# Patient Record
Sex: FEMALE | Race: WHITE | NOT HISPANIC OR LATINO | Employment: FULL TIME | URBAN - METROPOLITAN AREA
[De-identification: names, ages, dates, MRNs, and addresses within clinical notes are randomized per-mention and may not be internally consistent; named-entity substitution may affect disease eponyms.]

---

## 2017-01-03 ENCOUNTER — HOSPITAL ENCOUNTER (OUTPATIENT)
Dept: CARDIOLOGY | Facility: HOSPITAL | Age: 42
Discharge: HOME | End: 2017-01-03
Attending: INTERNAL MEDICINE

## 2017-01-03 VITALS
BODY MASS INDEX: 22.53 KG/M2 | RESPIRATION RATE: 20 BRPM | WEIGHT: 132 LBS | SYSTOLIC BLOOD PRESSURE: 117 MMHG | OXYGEN SATURATION: 97 % | DIASTOLIC BLOOD PRESSURE: 82 MMHG | HEART RATE: 88 BPM | HEIGHT: 64 IN

## 2017-01-03 DIAGNOSIS — R55 VASOVAGAL SYNCOPE: ICD-10-CM

## 2017-01-03 LAB
BH CV STRESS BP STAGE 1: NORMAL
BH CV STRESS BP STAGE 2: NORMAL
BH CV STRESS BP STAGE 3: NORMAL
BH CV STRESS DURATION MIN STAGE 1: 3
BH CV STRESS DURATION MIN STAGE 2: 3
BH CV STRESS DURATION MIN STAGE 3: 3
BH CV STRESS DURATION SEC STAGE 1: 0
BH CV STRESS DURATION SEC STAGE 2: 0
BH CV STRESS DURATION SEC STAGE 3: 0
BH CV STRESS GRADE STAGE 1: 10
BH CV STRESS GRADE STAGE 2: 12
BH CV STRESS GRADE STAGE 3: 14
BH CV STRESS HR STAGE 1: 133
BH CV STRESS HR STAGE 2: 140
BH CV STRESS HR STAGE 3: 167
BH CV STRESS METS STAGE 1: 5
BH CV STRESS METS STAGE 2: 7.5
BH CV STRESS METS STAGE 3: 10
BH CV STRESS PROTOCOL 1: NORMAL
BH CV STRESS RECOVERY BP: NORMAL MMHG
BH CV STRESS RECOVERY HR: 100 BPM
BH CV STRESS SPEED STAGE 1: 1.7
BH CV STRESS SPEED STAGE 2: 2.5
BH CV STRESS SPEED STAGE 3: 3.4
BH CV STRESS STAGE 1: 1
BH CV STRESS STAGE 2: 2
BH CV STRESS STAGE 3: 3
MAXIMAL PREDICTED HEART RATE: 178 BPM
PERCENT MAX PREDICTED HR: 96.07 %
STRESS BASELINE BP: NORMAL MMHG
STRESS BASELINE HR: 88 BPM
STRESS O2 SAT REST: 97 %
STRESS PERCENT HR: 113 %
STRESS POST ESTIMATED WORKLOAD: 10.2 METS
STRESS POST EXERCISE DUR MIN: 9 MIN
STRESS POST EXERCISE DUR SEC: 0 SEC
STRESS POST PEAK BP: NORMAL MMHG
STRESS POST PEAK HR: 171 BPM
STRESS TARGET HR: 151 BPM

## 2017-01-03 PROCEDURE — 93017 CV STRESS TEST TRACING ONLY: CPT

## 2017-01-03 PROCEDURE — 93018 CV STRESS TEST I&R ONLY: CPT | Performed by: INTERNAL MEDICINE

## 2017-01-03 PROCEDURE — 93016 CV STRESS TEST SUPVJ ONLY: CPT | Performed by: INTERNAL MEDICINE

## 2017-02-10 ENCOUNTER — OFFICE VISIT (OUTPATIENT)
Dept: INTERNAL MEDICINE | Facility: CLINIC | Age: 42
End: 2017-02-10

## 2017-02-10 VITALS
WEIGHT: 132.4 LBS | HEART RATE: 85 BPM | BODY MASS INDEX: 22.61 KG/M2 | SYSTOLIC BLOOD PRESSURE: 120 MMHG | OXYGEN SATURATION: 98 % | DIASTOLIC BLOOD PRESSURE: 80 MMHG | HEIGHT: 64 IN

## 2017-02-10 DIAGNOSIS — F90.2 ATTENTION DEFICIT HYPERACTIVITY DISORDER (ADHD), COMBINED TYPE: Primary | ICD-10-CM

## 2017-02-10 DIAGNOSIS — F41.9 ANXIETY: ICD-10-CM

## 2017-02-10 PROCEDURE — 99214 OFFICE O/P EST MOD 30 MIN: CPT | Performed by: INTERNAL MEDICINE

## 2017-02-10 RX ORDER — FLUOXETINE HYDROCHLORIDE 20 MG/1
20 CAPSULE ORAL DAILY
Qty: 90 CAPSULE | Refills: 3 | Status: SHIPPED | OUTPATIENT
Start: 2017-02-10 | End: 2017-11-21 | Stop reason: SDUPTHER

## 2017-02-10 RX ORDER — DEXTROAMPHETAMINE SACCHARATE, AMPHETAMINE ASPARTATE, DEXTROAMPHETAMINE SULFATE AND AMPHETAMINE SULFATE 5; 5; 5; 5 MG/1; MG/1; MG/1; MG/1
20 TABLET ORAL DAILY
Qty: 30 TABLET | Refills: 0 | Status: SHIPPED | OUTPATIENT
Start: 2017-02-10 | End: 2017-05-03 | Stop reason: SDUPTHER

## 2017-02-10 NOTE — PROGRESS NOTES
Subjective     Ruth Casillas is a 41 y.o. female, who presents with a chief complaint of   Chief Complaint   Patient presents with   • Med Refill       HPI   The pt is here because her psychiatrist moved to [place].  Psych was treating her anxiety and ADHD but is now too far for the pt to get there for regular check ups.  She has done well on adderall and prozac.  Her sx are currently well controlled with prozac and adderall.  Sleep ok.  Weight steady.  Mood ok.      The following portions of the patient's history were reviewed and updated as appropriate: allergies, current medications, past family history, past medical history, past social history, past surgical history and problem list.    Allergies: Review of patient's allergies indicates no known allergies.    Review of Systems   Constitutional: Negative.    HENT: Negative.    Eyes: Negative.    Respiratory: Negative.    Cardiovascular: Negative.    Gastrointestinal: Negative.    Endocrine: Negative.    Genitourinary: Negative.    Musculoskeletal: Negative.    Skin: Negative.    Allergic/Immunologic: Negative.    Neurological: Negative.    Hematological: Negative.    Psychiatric/Behavioral: Positive for decreased concentration.   All other systems reviewed and are negative.      Objective     Wt Readings from Last 3 Encounters:   02/10/17 132 lb 6.4 oz (60.1 kg)   01/03/17 132 lb (59.9 kg)   12/20/16 132 lb (59.9 kg)     Temp Readings from Last 3 Encounters:   07/01/16 98.1 °F (36.7 °C) (Oral)   04/14/15 98.8 °F (37.1 °C) (Oral)   01/21/15 98.1 °F (36.7 °C)     BP Readings from Last 3 Encounters:   02/10/17 120/80   01/03/17 117/82   12/20/16 116/66     Pulse Readings from Last 3 Encounters:   02/10/17 85   01/03/17 88   12/20/16 85     Body mass index is 22.73 kg/(m^2).  SpO2 Readings from Last 3 Encounters:   02/10/17 98%   01/03/17 97%   09/27/16 98%       Physical Exam   Constitutional: She is oriented to person, place, and time. She appears  well-developed and well-nourished. No distress.   HENT:   Head: Normocephalic and atraumatic.   Right Ear: External ear normal.   Left Ear: External ear normal.   Nose: Nose normal.   Mouth/Throat: Oropharynx is clear and moist.   Eyes: Conjunctivae and EOM are normal. Pupils are equal, round, and reactive to light.   Neck: Normal range of motion. Neck supple.   Cardiovascular: Normal rate, regular rhythm, normal heart sounds and intact distal pulses.    Pulmonary/Chest: Effort normal and breath sounds normal. No respiratory distress. She has no wheezes.   Musculoskeletal: Normal range of motion.   Normal gait   Neurological: She is alert and oriented to person, place, and time.   Skin: Skin is warm and dry.   Psychiatric: She has a normal mood and affect. Her behavior is normal. Judgment and thought content normal.   Nursing note and vitals reviewed.      Results for orders placed or performed during the hospital encounter of 01/03/17   Treadmill Stress Test   Result Value Ref Range    BH CV STRESS PROTOCOL 1 Pardeep     Stage 1 1     HR Stage 1 133     BP Stage 1 163/90     Duration Min Stage 1 3     Duration Sec Stage 1 0     Grade Stage 1 10     Speed Stage 1 1.7     BH CV STRESS METS STAGE 1 5     Stage 2 2     HR Stage 2 140     BP Stage 2 152/84     Duration Min Stage 2 3     Duration Sec Stage 2 0     Grade Stage 2 12     Speed Stage 2 2.5     BH CV STRESS METS STAGE 2 7.5     Stage 3 3     HR Stage 3 167     BP Stage 3 171/81     Duration Min Stage 3 3     Duration Sec Stage 3 0     Grade Stage 3 14     Speed Stage 3 3.4     BH CV STRESS METS STAGE 3 10.0     Baseline HR 88 bpm    Baseline /82 mmHg    O2 sat rest 97 %    Peak  bpm    Percent Max Pred HR 96.07 %    Percent Target  %    Peak /90 mmHg    Recovery  bpm    Recovery /86 mmHg    Target HR (85%) 151 bpm    Max. Pred. HR (100%) 178 bpm    Exercise duration (min) 9 min    Exercise duration (sec) 0 sec    Estimated  workload 10.2 METS       Assessment/Plan   Ruth was seen today for med refill.    Diagnoses and all orders for this visit:    Attention deficit hyperactivity disorder (ADHD), combined type  -     amphetamine-dextroamphetamine (ADDERALL) 20 MG tablet; Take 1 tablet by mouth Daily.  -     Urine Drug Screen; Future    Anxiety  -     FLUoxetine (PROzac) 20 MG capsule; Take 1 capsule by mouth Daily.      Fill one time.  Obtain records from psych.    Outpatient Medications Prior to Visit   Medication Sig Dispense Refill   • amphetamine-dextroamphetamine (ADDERALL) 20 MG tablet Take 20 mg by mouth daily.     • FLUoxetine (PROzac) 20 MG capsule Take 20 mg by mouth daily.       No facility-administered medications prior to visit.      New Medications Ordered This Visit   Medications   • FLUoxetine (PROzac) 20 MG capsule     Sig: Take 1 capsule by mouth Daily.     Dispense:  90 capsule     Refill:  3   • amphetamine-dextroamphetamine (ADDERALL) 20 MG tablet     Sig: Take 1 tablet by mouth Daily.     Dispense:  30 tablet     Refill:  0       Medications Discontinued During This Encounter   Medication Reason   • FLUoxetine (PROzac) 20 MG capsule Reorder   • amphetamine-dextroamphetamine (ADDERALL) 20 MG tablet Reorder         Return in about 3 months (around 5/10/2017) for Recheck.

## 2017-02-14 ENCOUNTER — TELEPHONE (OUTPATIENT)
Dept: INTERNAL MEDICINE | Facility: CLINIC | Age: 42
End: 2017-02-14

## 2017-02-14 NOTE — TELEPHONE ENCOUNTER
[facility] returned phone call, release on file, will send us records.   ----- Message from Jerri Wellington MD sent at 2/10/2017  4:33 PM EST -----  Regarding: psych records  Get records from [facility] to document pt's adhd.  Provider was at [facility] and moved to [place].

## 2017-02-18 LAB
AMPHETAMINES UR QL: POSITIVE
BARBITURATES UR QL SCN: NEGATIVE NG/ML
BENZODIAZ UR QL SCN: NEGATIVE NG/ML
BZE UR QL SCN: NEGATIVE NG/ML
CANNABINOIDS UR QL SCN: NEGATIVE NG/ML
CREAT UR-MCNC: 208.2 MG/DL (ref 20–300)
FENTANYL+NORFENTANYL UR QL SCN: NEGATIVE PG/ML
Lab: ABNORMAL
MEPERIDINE UR QL: NEGATIVE NG/ML
METHADONE UR QL SCN: NEGATIVE NG/ML
OPIATES UR QL SCN: NEGATIVE NG/ML
OXYCODONE+OXYMORPHONE UR QL SCN: NEGATIVE NG/ML
PCP UR QL: NEGATIVE NG/ML
PH UR: 5.3 [PH] (ref 4.5–8.9)
PROPOXYPH UR QL SCN: NEGATIVE NG/ML
SP GR UR: 1.03
TRAMADOL UR QL SCN: NEGATIVE NG/ML

## 2017-05-03 DIAGNOSIS — F90.2 ATTENTION DEFICIT HYPERACTIVITY DISORDER (ADHD), COMBINED TYPE: ICD-10-CM

## 2017-05-03 RX ORDER — DEXTROAMPHETAMINE SACCHARATE, AMPHETAMINE ASPARTATE, DEXTROAMPHETAMINE SULFATE AND AMPHETAMINE SULFATE 5; 5; 5; 5 MG/1; MG/1; MG/1; MG/1
20 TABLET ORAL DAILY
Qty: 30 TABLET | Refills: 0 | OUTPATIENT
Start: 2017-05-03

## 2017-05-03 RX ORDER — DEXTROAMPHETAMINE SACCHARATE, AMPHETAMINE ASPARTATE, DEXTROAMPHETAMINE SULFATE AND AMPHETAMINE SULFATE 5; 5; 5; 5 MG/1; MG/1; MG/1; MG/1
20 TABLET ORAL DAILY
Qty: 30 TABLET | Refills: 0 | Status: SHIPPED | OUTPATIENT
Start: 2017-05-03 | End: 2017-06-16 | Stop reason: SDUPTHER

## 2017-06-08 ENCOUNTER — TELEPHONE (OUTPATIENT)
Dept: INTERNAL MEDICINE | Facility: CLINIC | Age: 42
End: 2017-06-08

## 2017-06-08 NOTE — TELEPHONE ENCOUNTER
----- Message from Kindra Main MA sent at 2017  3:35 PM EDT -----  Regarding: FW: NEEDS A REFILL  Contact: [phone]      ----- Message -----     From: Shahla Wright MD     Sent: 2017   2:47 PM       To: Kindra Main MA  Subject: RE: NEEDS A REFILL                               We need to see her every 3 months for her ADHD. She was supposed to follow up with Dr. Wellington in 2017. Please have her schedule appointment to be seen with one of the providers.     ----- Message -----     From: Kindra Main MA     Sent: 2017   2:17 PM       To: Shahla Wright MD  Subject: FW: NEEDS A REFILL                                Dr wellington's pt,   lov   2/10/17  [facility]     ----- Message -----     From: Nayeli Marcial     Sent: 2017   1:56 PM       To: Kindra Main MA  Subject: NEEDS A REFILL                                   :  73  CHLOE PATIENT    PATIENT NEEDS A REFILL ON ADDERALL, 20 MG TABLET, TAKES ONE A DAY. PATIENT FORMERLY TOOK TWICE A DAY AND FEELS SHE NEEDS TO TAKE 20 MG TWICE A DAY.

## 2017-06-16 ENCOUNTER — OFFICE VISIT (OUTPATIENT)
Dept: INTERNAL MEDICINE | Facility: CLINIC | Age: 42
End: 2017-06-16

## 2017-06-16 VITALS
DIASTOLIC BLOOD PRESSURE: 78 MMHG | OXYGEN SATURATION: 98 % | BODY MASS INDEX: 22.94 KG/M2 | HEART RATE: 97 BPM | SYSTOLIC BLOOD PRESSURE: 118 MMHG | HEIGHT: 64 IN | WEIGHT: 134.4 LBS

## 2017-06-16 DIAGNOSIS — F90.2 ATTENTION DEFICIT HYPERACTIVITY DISORDER (ADHD), COMBINED TYPE: Primary | ICD-10-CM

## 2017-06-16 PROCEDURE — 99213 OFFICE O/P EST LOW 20 MIN: CPT | Performed by: INTERNAL MEDICINE

## 2017-06-16 RX ORDER — DEXTROAMPHETAMINE SACCHARATE, AMPHETAMINE ASPARTATE, DEXTROAMPHETAMINE SULFATE AND AMPHETAMINE SULFATE 5; 5; 5; 5 MG/1; MG/1; MG/1; MG/1
20 TABLET ORAL 2 TIMES DAILY
Qty: 60 TABLET | Refills: 0 | Status: SHIPPED | OUTPATIENT
Start: 2017-06-16 | End: 2017-07-27 | Stop reason: SDUPTHER

## 2017-06-16 NOTE — PROGRESS NOTES
"Subjective     Ruth Casillas is a 41 y.o. female, who presents with a chief complaint of   Chief Complaint   Patient presents with   • Follow-up     Dr. Wellington pt, 3 mon f/u   • ADHD     med check, refill        HPI Comments: Patient is here for follow up of her ADHD and is patient of Dr. Wellington's that I seeing for follow up today and her ADHD is new to me. She is doing well and excited to go on vacation. Her ADHD is fair, but felt that she was better controlled on 20mg BID. She says that she feels more scattered brained when she takes it once per day. Her daughter has noticed her being less \"focused\". Her weight is stable and her HR is slightly higher today because she is excited about vacation.        The following portions of the patient's history were reviewed and updated as appropriate: allergies, current medications, past family history, past medical history, past social history, past surgical history and problem list.    Allergies: Review of patient's allergies indicates no known allergies.    Current Outpatient Prescriptions:   •  amphetamine-dextroamphetamine (ADDERALL) 20 MG tablet, Take 1 tablet by mouth 2 (Two) Times a Day., Disp: 60 tablet, Rfl: 0  •  FLUoxetine (PROzac) 20 MG capsule, Take 1 capsule by mouth Daily., Disp: 90 capsule, Rfl: 3  Medications Discontinued During This Encounter   Medication Reason   • amphetamine-dextroamphetamine (ADDERALL) 20 MG tablet Reorder       Review of Systems   Constitutional: Negative for chills, fatigue and fever.   Cardiovascular: Negative for chest pain and palpitations.   Gastrointestinal: Negative for nausea and vomiting.   Skin: Negative for rash.   Psychiatric/Behavioral: Positive for decreased concentration. The patient is not nervous/anxious and is not hyperactive.        Objective     /78 (BP Location: Left arm, Patient Position: Sitting, Cuff Size: Adult)  Pulse 97  Ht 64\" (162.6 cm)  Wt 134 lb 6.4 oz (61 kg)  SpO2 98%  BMI 23.07 " kg/m2      Physical Exam   Constitutional: She is oriented to person, place, and time. She appears well-developed and well-nourished. No distress.   HENT:   Head: Normocephalic and atraumatic.   Right Ear: External ear normal.   Left Ear: External ear normal.   Mouth/Throat: Oropharynx is clear and moist. No oropharyngeal exudate.   Eyes: Conjunctivae are normal. Right eye exhibits no discharge. Left eye exhibits no discharge. No scleral icterus.   Neck: Neck supple.   Cardiovascular: Normal rate, regular rhythm and normal heart sounds.  Exam reveals no gallop and no friction rub.    No murmur heard.  Pulmonary/Chest: Effort normal and breath sounds normal. No respiratory distress. She has no wheezes. She has no rales.   Lymphadenopathy:     She has no cervical adenopathy.   Neurological: She is alert and oriented to person, place, and time.   Skin: Skin is warm. No rash noted.   Psychiatric: She has a normal mood and affect. Her behavior is normal.   Nursing note and vitals reviewed.        Results for orders placed or performed in visit on 02/10/17   Pain Management Profile (13 Drugs) Urine   Result Value Ref Range    Amphetamine, Urine Qual Positive (A) Nikvul=2904    Barbiturates Screen, Urine Negative Uoujth=331 ng/mL    Benzodiazepine Screen, Urine Negative Pniset=903 ng/mL    THC Screen, Urine Negative Cutoff=20 ng/mL    Cocaine Screen, Urine Negative Svmwue=728 ng/mL    Opiate Screen, Urine Negative Nrqyub=930 ng/mL    Oxycodone/Oxymorphone, Urine Negative Lltrxb=231 ng/mL    Phencyclidine (PCP), Urine Negative Cutoff=25 ng/mL    Methadone Screen, Urine Negative Hnvjze=554 ng/mL    Propoxyphene Screen Negative Yxrspp=213 ng/mL    Meperidine, Urine Negative Kpjtvs=830 ng/mL    Fentanyl, Urine Negative Vvpssf=8059 pg/mL    Tramadol Screen, Urine Negative Sqjvbc=238 ng/mL    Creatinine, Urine 208.2 20.0 - 300.0 mg/dL    Specific Gravity, UA 1.026     pH, UA 5.3 4.5 - 8.9    Please note Comment         Assessment/Plan   Ruth was seen today for follow-up and adhd.    Diagnoses and all orders for this visit:    Attention deficit hyperactivity disorder (ADHD), combined type  -     amphetamine-dextroamphetamine (ADDERALL) 20 MG tablet; Take 1 tablet by mouth 2 (Two) Times a Day.      Will increase to BID dosing due to increase in symptoms. It looks as though she was on when she was followed by Dr. [facility]. Monitor HR, BP, and weight. Patient's last UDS was 2/2017 and will need to see Dr. Wellington in 3 months for follow up.I will take care of her prescriptions while she is on maternity leave.     Return in about 3 months (around 9/16/2017) for Recheck.    Shahla Wright MD  06/16/2017

## 2017-07-27 DIAGNOSIS — F90.2 ATTENTION DEFICIT HYPERACTIVITY DISORDER (ADHD), COMBINED TYPE: ICD-10-CM

## 2017-07-27 RX ORDER — DEXTROAMPHETAMINE SACCHARATE, AMPHETAMINE ASPARTATE, DEXTROAMPHETAMINE SULFATE AND AMPHETAMINE SULFATE 5; 5; 5; 5 MG/1; MG/1; MG/1; MG/1
20 TABLET ORAL 2 TIMES DAILY
Qty: 60 TABLET | Refills: 0 | Status: SHIPPED | OUTPATIENT
Start: 2017-07-27 | End: 2017-08-29 | Stop reason: SDUPTHER

## 2017-07-28 ENCOUNTER — TELEPHONE (OUTPATIENT)
Dept: INTERNAL MEDICINE | Facility: CLINIC | Age: 42
End: 2017-07-28

## 2017-07-28 NOTE — TELEPHONE ENCOUNTER
----- Message from Shahla Wright MD sent at 7/27/2017 11:58 AM EDT -----  Yes  ----- Message -----     From: Kindra Main MA     Sent: 7/27/2017  11:02 AM       To: MD DR CHLOE Tristan'S PT WILL YOU SIGN THIS??  ----- Message -----     From: Kindra Main MA     Sent: 7/27/2017  11:00 AM       To: Kindra Main MA    PT NEEDS REFILL ON ADDERALL 20MG  TAKE ON PO BID   LOV  6/6/17  [facility]    [phone]      Left message to  script

## 2017-08-29 DIAGNOSIS — F90.2 ATTENTION DEFICIT HYPERACTIVITY DISORDER (ADHD), COMBINED TYPE: ICD-10-CM

## 2017-08-29 RX ORDER — DEXTROAMPHETAMINE SACCHARATE, AMPHETAMINE ASPARTATE, DEXTROAMPHETAMINE SULFATE AND AMPHETAMINE SULFATE 5; 5; 5; 5 MG/1; MG/1; MG/1; MG/1
20 TABLET ORAL 2 TIMES DAILY
Qty: 60 TABLET | Refills: 0 | Status: SHIPPED | OUTPATIENT
Start: 2017-08-29 | End: 2017-10-02 | Stop reason: SDUPTHER

## 2017-08-30 ENCOUNTER — TELEPHONE (OUTPATIENT)
Dept: INTERNAL MEDICINE | Facility: CLINIC | Age: 42
End: 2017-08-30

## 2017-08-30 NOTE — TELEPHONE ENCOUNTER
----- Message from Kindra Main MA sent at 8/29/2017  3:38 PM EDT -----  Pt needs refill on adderall   20 po bid    Last filled   07/31/17   Dr acosta    [phone]      lov   6/1`6/17  [facility]   Left message to pick up0 script

## 2017-10-02 DIAGNOSIS — F90.2 ATTENTION DEFICIT HYPERACTIVITY DISORDER (ADHD), COMBINED TYPE: ICD-10-CM

## 2017-10-02 RX ORDER — DEXTROAMPHETAMINE SACCHARATE, AMPHETAMINE ASPARTATE, DEXTROAMPHETAMINE SULFATE AND AMPHETAMINE SULFATE 5; 5; 5; 5 MG/1; MG/1; MG/1; MG/1
20 TABLET ORAL 2 TIMES DAILY
Qty: 60 TABLET | Refills: 0 | Status: SHIPPED | OUTPATIENT
Start: 2017-10-02 | End: 2017-11-21 | Stop reason: SDUPTHER

## 2017-10-03 ENCOUNTER — TELEPHONE (OUTPATIENT)
Dept: INTERNAL MEDICINE | Facility: CLINIC | Age: 42
End: 2017-10-03

## 2017-10-03 NOTE — TELEPHONE ENCOUNTER
----- Message from Kindra Main MA sent at 10/2/2017 12:36 PM EDT -----  NEEDS REFILL ON ADDERALL  20MG I PO BID.    [phone]   LOV   6/16/17  [facility]            LEFT MESSAGE TO  SCRIPT

## 2017-10-04 ENCOUNTER — OFFICE VISIT (OUTPATIENT)
Dept: RETAIL CLINIC | Facility: CLINIC | Age: 42
End: 2017-10-04

## 2017-10-04 DIAGNOSIS — Z23 FLU VACCINE NEED: Primary | ICD-10-CM

## 2017-11-21 ENCOUNTER — OFFICE VISIT (OUTPATIENT)
Dept: INTERNAL MEDICINE | Facility: CLINIC | Age: 42
End: 2017-11-21

## 2017-11-21 VITALS
OXYGEN SATURATION: 98 % | WEIGHT: 141 LBS | HEART RATE: 96 BPM | DIASTOLIC BLOOD PRESSURE: 76 MMHG | SYSTOLIC BLOOD PRESSURE: 100 MMHG | HEIGHT: 64 IN | BODY MASS INDEX: 24.07 KG/M2

## 2017-11-21 DIAGNOSIS — F90.2 ATTENTION DEFICIT HYPERACTIVITY DISORDER (ADHD), COMBINED TYPE: Primary | ICD-10-CM

## 2017-11-21 DIAGNOSIS — F41.9 ANXIETY: ICD-10-CM

## 2017-11-21 PROCEDURE — 99214 OFFICE O/P EST MOD 30 MIN: CPT | Performed by: INTERNAL MEDICINE

## 2017-11-21 RX ORDER — FLUOXETINE HYDROCHLORIDE 20 MG/1
20 CAPSULE ORAL DAILY
Qty: 90 CAPSULE | Refills: 1 | Status: SHIPPED | OUTPATIENT
Start: 2017-11-21 | End: 2018-04-27 | Stop reason: SDUPTHER

## 2017-11-21 RX ORDER — DEXTROAMPHETAMINE SACCHARATE, AMPHETAMINE ASPARTATE, DEXTROAMPHETAMINE SULFATE AND AMPHETAMINE SULFATE 5; 5; 5; 5 MG/1; MG/1; MG/1; MG/1
20 TABLET ORAL 2 TIMES DAILY
Qty: 60 TABLET | Refills: 0 | Status: SHIPPED | OUTPATIENT
Start: 2017-11-21 | End: 2017-12-29 | Stop reason: SDUPTHER

## 2017-11-21 NOTE — PROGRESS NOTES
Subjective     Ruth Casillas is a 41 y.o. female, who presents with a chief complaint of   Chief Complaint   Patient presents with   • Follow-up   • Med Refill       HPI   The pt is here for follow up an anxiety and adhd issues.  The pt has been doing well overall.  Her sx are fairly well controlled at this time.  Weight up a little.  Sleeping ok.  She is due for a refill on her adderall.  bp well controlled.  No ha/dizziness.  She hasnt been on prozac and would like to restart.    She was on topamax in the past for headaches.  She isnt sure if she wants to go back on the med or not.      The following portions of the patient's history were reviewed and updated as appropriate: allergies, current medications, past family history, past medical history, past social history, past surgical history and problem list.    Allergies: Review of patient's allergies indicates no known allergies.    Review of Systems   Constitutional: Negative.    HENT: Negative.    Eyes: Negative.    Respiratory: Negative.    Cardiovascular: Negative.    Gastrointestinal: Negative.    Endocrine: Negative.    Genitourinary: Negative.    Musculoskeletal: Negative.    Skin: Negative.    Allergic/Immunologic: Negative.    Neurological: Negative.    Hematological: Negative.    Psychiatric/Behavioral: Negative.    All other systems reviewed and are negative.      Objective     Wt Readings from Last 3 Encounters:   11/21/17 141 lb (64 kg)   06/16/17 134 lb 6.4 oz (61 kg)   02/10/17 132 lb 6.4 oz (60.1 kg)     Temp Readings from Last 3 Encounters:   07/01/16 98.1 °F (36.7 °C) (Oral)   04/14/15 98.8 °F (37.1 °C) (Oral)   01/21/15 98.1 °F (36.7 °C)     BP Readings from Last 3 Encounters:   11/21/17 100/76   06/16/17 118/78   02/10/17 120/80     Pulse Readings from Last 3 Encounters:   11/21/17 96   06/16/17 97   02/10/17 85     Body mass index is 24.2 kg/(m^2).  SpO2 Readings from Last 3 Encounters:   11/21/17 98%   06/16/17 98%   02/10/17 98%        Physical Exam   Constitutional: She is oriented to person, place, and time. She appears well-developed and well-nourished. No distress.   HENT:   Head: Normocephalic and atraumatic.   Right Ear: External ear normal.   Left Ear: External ear normal.   Nose: Nose normal.   Mouth/Throat: Oropharynx is clear and moist.   Eyes: Conjunctivae and EOM are normal. Pupils are equal, round, and reactive to light.   Neck: Normal range of motion. Neck supple.   Cardiovascular: Normal rate, regular rhythm, normal heart sounds and intact distal pulses.    Pulmonary/Chest: Effort normal and breath sounds normal. No respiratory distress. She has no wheezes.   Musculoskeletal: Normal range of motion.   Normal gait   Neurological: She is alert and oriented to person, place, and time.   Skin: Skin is warm and dry.   Psychiatric: She has a normal mood and affect. Her behavior is normal. Judgment and thought content normal.   Nursing note and vitals reviewed.      Results for orders placed or performed in visit on 02/10/17   Pain Management Profile (13 Drugs) Urine   Result Value Ref Range    Amphetamine, Urine Qual Positive (A) Tzctph=2629    Barbiturates Screen, Urine Negative Semqum=468 ng/mL    Benzodiazepine Screen, Urine Negative Wmmwqi=953 ng/mL    THC Screen, Urine Negative Cutoff=20 ng/mL    Cocaine Screen, Urine Negative Noyizi=082 ng/mL    Opiate Screen, Urine Negative Biobty=581 ng/mL    Oxycodone/Oxymorphone, Urine Negative Eltrtm=928 ng/mL    Phencyclidine (PCP), Urine Negative Cutoff=25 ng/mL    Methadone Screen, Urine Negative Mnvrlf=668 ng/mL    Propoxyphene Screen Negative Ibfgji=056 ng/mL    Meperidine, Urine Negative Fajshk=282 ng/mL    Fentanyl, Urine Negative Qyyxcc=2491 pg/mL    Tramadol Screen, Urine Negative Oksgju=855 ng/mL    Creatinine, Urine 208.2 20.0 - 300.0 mg/dL    Specific Gravity, UA 1.026     pH, UA 5.3 4.5 - 8.9    Please note Comment        Assessment/Plan   Ruth was seen today for follow-up  and med refill.    Diagnoses and all orders for this visit:    Attention deficit hyperactivity disorder (ADHD), combined type  -     amphetamine-dextroamphetamine (ADDERALL) 20 MG tablet; Take 1 tablet by mouth 2 (Two) Times a Day.    Anxiety - restart prozac.      Outpatient Medications Prior to Visit   Medication Sig Dispense Refill   • FLUoxetine (PROzac) 20 MG capsule Take 1 capsule by mouth Daily. 90 capsule 3   • amphetamine-dextroamphetamine (ADDERALL) 20 MG tablet Take 1 tablet by mouth 2 (Two) Times a Day. 60 tablet 0     No facility-administered medications prior to visit.      New Medications Ordered This Visit   Medications   • amphetamine-dextroamphetamine (ADDERALL) 20 MG tablet     Sig: Take 1 tablet by mouth 2 (Two) Times a Day.     Dispense:  60 tablet     Refill:  0       Medications Discontinued During This Encounter   Medication Reason   • amphetamine-dextroamphetamine (ADDERALL) 20 MG tablet Reorder     Return in about 3 months (around 2/21/2018) for Recheck.

## 2017-12-29 ENCOUNTER — TELEPHONE (OUTPATIENT)
Dept: INTERNAL MEDICINE | Facility: CLINIC | Age: 42
End: 2017-12-29

## 2017-12-29 DIAGNOSIS — F90.2 ATTENTION DEFICIT HYPERACTIVITY DISORDER (ADHD), COMBINED TYPE: ICD-10-CM

## 2017-12-29 RX ORDER — DEXTROAMPHETAMINE SACCHARATE, AMPHETAMINE ASPARTATE, DEXTROAMPHETAMINE SULFATE AND AMPHETAMINE SULFATE 5; 5; 5; 5 MG/1; MG/1; MG/1; MG/1
20 TABLET ORAL
Qty: 60 TABLET | Refills: 0 | Status: SHIPPED | OUTPATIENT
Start: 2017-12-29 | End: 2018-02-12 | Stop reason: SDUPTHER

## 2018-02-12 DIAGNOSIS — F90.2 ATTENTION DEFICIT HYPERACTIVITY DISORDER (ADHD), COMBINED TYPE: ICD-10-CM

## 2018-02-13 RX ORDER — DEXTROAMPHETAMINE SACCHARATE, AMPHETAMINE ASPARTATE, DEXTROAMPHETAMINE SULFATE AND AMPHETAMINE SULFATE 5; 5; 5; 5 MG/1; MG/1; MG/1; MG/1
20 TABLET ORAL
Qty: 60 TABLET | Refills: 0 | Status: SHIPPED | OUTPATIENT
Start: 2018-02-13 | End: 2018-03-20 | Stop reason: SDUPTHER

## 2018-03-20 ENCOUNTER — TELEPHONE (OUTPATIENT)
Dept: INTERNAL MEDICINE | Facility: CLINIC | Age: 43
End: 2018-03-20

## 2018-03-20 DIAGNOSIS — F90.2 ATTENTION DEFICIT HYPERACTIVITY DISORDER (ADHD), COMBINED TYPE: ICD-10-CM

## 2018-03-20 RX ORDER — DEXTROAMPHETAMINE SACCHARATE, AMPHETAMINE ASPARTATE, DEXTROAMPHETAMINE SULFATE AND AMPHETAMINE SULFATE 5; 5; 5; 5 MG/1; MG/1; MG/1; MG/1
20 TABLET ORAL
Qty: 60 TABLET | Refills: 0 | Status: SHIPPED | OUTPATIENT
Start: 2018-03-20 | End: 2018-04-27 | Stop reason: SDUPTHER

## 2018-03-20 NOTE — TELEPHONE ENCOUNTER
Pt aware to   script and ready and sign narc agreement    ----- Message from Kindra Main MA sent at 3/20/2018 10:34 AM EDT -----  Pt called needs refill on  adderall   20mg po bid.   lov   11/21/17  Needs ov.    Needs narc and favian     [phone]

## 2018-04-27 ENCOUNTER — TELEPHONE (OUTPATIENT)
Dept: INTERNAL MEDICINE | Facility: CLINIC | Age: 43
End: 2018-04-27

## 2018-04-27 DIAGNOSIS — F41.9 ANXIETY: ICD-10-CM

## 2018-04-27 DIAGNOSIS — F90.2 ATTENTION DEFICIT HYPERACTIVITY DISORDER (ADHD), COMBINED TYPE: ICD-10-CM

## 2018-04-27 RX ORDER — DEXTROAMPHETAMINE SACCHARATE, AMPHETAMINE ASPARTATE, DEXTROAMPHETAMINE SULFATE AND AMPHETAMINE SULFATE 5; 5; 5; 5 MG/1; MG/1; MG/1; MG/1
20 TABLET ORAL
Qty: 60 TABLET | Refills: 0 | Status: SHIPPED | OUTPATIENT
Start: 2018-04-27 | End: 2018-07-05 | Stop reason: SDUPTHER

## 2018-04-27 RX ORDER — FLUOXETINE HYDROCHLORIDE 20 MG/1
20 CAPSULE ORAL DAILY
Qty: 90 CAPSULE | Refills: 1 | Status: SHIPPED | OUTPATIENT
Start: 2018-04-27 | End: 2018-07-05 | Stop reason: SDUPTHER

## 2018-04-27 NOTE — TELEPHONE ENCOUNTER
Sent to pharmacy     Left message for pt        ----- Message from Kindra Main MA sent at 4/27/2018  1:51 PM EDT -----  Pt called needs refill on  adderall   20mg  Take one po  Bid   #60  Also needs refill on fluoxetin 20mg    lov   11/21/17  amisha    [phone]

## 2018-07-05 ENCOUNTER — OFFICE VISIT (OUTPATIENT)
Dept: INTERNAL MEDICINE | Facility: CLINIC | Age: 43
End: 2018-07-05

## 2018-07-05 VITALS
HEIGHT: 64 IN | SYSTOLIC BLOOD PRESSURE: 100 MMHG | OXYGEN SATURATION: 98 % | BODY MASS INDEX: 25.68 KG/M2 | DIASTOLIC BLOOD PRESSURE: 62 MMHG | HEART RATE: 80 BPM | WEIGHT: 150.4 LBS

## 2018-07-05 DIAGNOSIS — F90.2 ATTENTION DEFICIT HYPERACTIVITY DISORDER (ADHD), COMBINED TYPE: ICD-10-CM

## 2018-07-05 DIAGNOSIS — F41.9 ANXIETY: Primary | ICD-10-CM

## 2018-07-05 DIAGNOSIS — R51.9 CHRONIC NONINTRACTABLE HEADACHE, UNSPECIFIED HEADACHE TYPE: ICD-10-CM

## 2018-07-05 DIAGNOSIS — G89.29 CHRONIC NONINTRACTABLE HEADACHE, UNSPECIFIED HEADACHE TYPE: ICD-10-CM

## 2018-07-05 PROCEDURE — 99214 OFFICE O/P EST MOD 30 MIN: CPT | Performed by: INTERNAL MEDICINE

## 2018-07-05 RX ORDER — FLUOXETINE HYDROCHLORIDE 40 MG/1
40 CAPSULE ORAL DAILY
Qty: 90 CAPSULE | Refills: 1 | Status: SHIPPED | OUTPATIENT
Start: 2018-07-05 | End: 2019-04-30 | Stop reason: SDUPTHER

## 2018-07-05 RX ORDER — DEXTROAMPHETAMINE SACCHARATE, AMPHETAMINE ASPARTATE, DEXTROAMPHETAMINE SULFATE AND AMPHETAMINE SULFATE 5; 5; 5; 5 MG/1; MG/1; MG/1; MG/1
20 TABLET ORAL
Qty: 60 TABLET | Refills: 0 | Status: SHIPPED | OUTPATIENT
Start: 2018-07-05 | End: 2018-08-14 | Stop reason: SDUPTHER

## 2018-07-05 RX ORDER — TOPIRAMATE 25 MG/1
25 TABLET ORAL 2 TIMES DAILY
Qty: 180 TABLET | Refills: 0 | Status: SHIPPED | OUTPATIENT
Start: 2018-07-05 | End: 2018-12-28 | Stop reason: SDUPTHER

## 2018-07-05 NOTE — PROGRESS NOTES
Ruth Casillas is a 42 y.o. female, who presents with a chief complaint of   Chief Complaint   Patient presents with   • Follow-up   • ADHD       HPI   The pt is here for follow up.      ADHD - pt on adderall. She hasnt been taking it recently bc she was due for f/u appt and didn't have time to get here.  She struggles with taking meds regularly.    anxiety - the pt increased her prozac to 40 mg and felt some better.  Weight is going up    Headaches - stress triggers her headaches and they have been worse lately.  She was on topamax in the past and it did help her feel better.     Her 15 yo son was diagnosed with epilepsy and they have had to go to lots of dr aiden.  .  He has had to go to summer school.  He also has adhd.  She hasnt been taking good care of herself with all of this going on.        The following portions of the patient's history were reviewed and updated as appropriate: allergies, current medications, past family history, past medical history, past social history, past surgical history and problem list.    Allergies: Patient has no known allergies.    Review of Systems   Constitutional: Negative.    Eyes: Negative.    Respiratory: Negative.    Cardiovascular: Negative.    Gastrointestinal: Negative.    Endocrine: Negative.    Genitourinary: Negative.    Musculoskeletal: Negative.    Skin: Negative.    Allergic/Immunologic: Negative.    Neurological: Positive for headaches.   Hematological: Negative.    Psychiatric/Behavioral: The patient is nervous/anxious.    All other systems reviewed and are negative.            Wt Readings from Last 3 Encounters:   07/05/18 68.2 kg (150 lb 6.4 oz)   11/21/17 64 kg (141 lb)   06/16/17 61 kg (134 lb 6.4 oz)     Temp Readings from Last 3 Encounters:   07/01/16 98.1 °F (36.7 °C) (Oral)   04/14/15 98.8 °F (37.1 °C) (Oral)   01/21/15 98.1 °F (36.7 °C)     BP Readings from Last 3 Encounters:   07/05/18 100/62   11/21/17 100/76   06/16/17 118/78     Pulse  Readings from Last 3 Encounters:   07/05/18 80   11/21/17 96   06/16/17 97     Body mass index is 25.82 kg/m².  @LASTSAO2(3)@    Physical Exam   Constitutional: She is oriented to person, place, and time. She appears well-developed and well-nourished. No distress.   HENT:   Head: Normocephalic and atraumatic.   Right Ear: External ear normal.   Left Ear: External ear normal.   Nose: Nose normal.   Mouth/Throat: Oropharynx is clear and moist.   Eyes: Conjunctivae and EOM are normal. Pupils are equal, round, and reactive to light.   Neck: Normal range of motion. Neck supple.   Cardiovascular: Normal rate, regular rhythm, normal heart sounds and intact distal pulses.    Pulmonary/Chest: Effort normal and breath sounds normal. No respiratory distress. She has no wheezes.   Musculoskeletal: Normal range of motion.   Normal gait   Neurological: She is alert and oriented to person, place, and time.   Skin: Skin is warm and dry.   Psychiatric: She has a normal mood and affect. Her behavior is normal. Judgment and thought content normal.   Nursing note and vitals reviewed.      Results for orders placed or performed in visit on 02/10/17   Pain Management Profile (13 Drugs) Urine   Result Value Ref Range    Amphetamine, Urine Qual Positive (A) Nrdsie=7377    Barbiturates Screen, Urine Negative Jhwcci=967 ng/mL    Benzodiazepine Screen, Urine Negative Xbzmtc=776 ng/mL    THC Screen, Urine Negative Cutoff=20 ng/mL    Cocaine Screen, Urine Negative Illvng=558 ng/mL    Opiate Screen, Urine Negative Mdmknd=490 ng/mL    Oxycodone/Oxymorphone, Urine Negative Pyxquf=734 ng/mL    Phencyclidine (PCP), Urine Negative Cutoff=25 ng/mL    Methadone Screen, Urine Negative Dgmjvc=933 ng/mL    Propoxyphene Screen Negative Ltxppa=082 ng/mL    Meperidine, Urine Negative Adgted=916 ng/mL    Fentanyl, Urine Negative Mefixy=1541 pg/mL    Tramadol Screen, Urine Negative Gpwjun=429 ng/mL    Creatinine, Urine 208.2 20.0 - 300.0 mg/dL    Specific  Gravity, UA 1.026     pH, UA 5.3 4.5 - 8.9    Please note Comment            Ruth was seen today for follow-up and adhd.    Diagnoses and all orders for this visit:    Anxiety  -     FLUoxetine (PROzac) 40 MG capsule; Take 1 capsule by mouth Daily.    Attention deficit hyperactivity disorder (ADHD), combined type  -     amphetamine-dextroamphetamine (ADDERALL) 20 MG tablet; Take 1 tablet by mouth 2 (Two) Times a Day.    Chronic nonintractable headache, unspecified headache type  -     topiramate (TOPAMAX) 25 MG tablet; Take 1 tablet by mouth 2 (Two) Times a Day.          Outpatient Medications Prior to Visit   Medication Sig Dispense Refill   • amphetamine-dextroamphetamine (ADDERALL) 20 MG tablet Take 1 tablet by mouth 2 (Two) Times a Day. 60 tablet 0   • FLUoxetine (PROzac) 20 MG capsule Take 1 capsule by mouth Daily. 90 capsule 1     No facility-administered medications prior to visit.      New Medications Ordered This Visit   Medications   • FLUoxetine (PROzac) 40 MG capsule     Sig: Take 1 capsule by mouth Daily.     Dispense:  90 capsule     Refill:  1   • amphetamine-dextroamphetamine (ADDERALL) 20 MG tablet     Sig: Take 1 tablet by mouth 2 (Two) Times a Day.     Dispense:  60 tablet     Refill:  0   • topiramate (TOPAMAX) 25 MG tablet     Sig: Take 1 tablet by mouth 2 (Two) Times a Day.     Dispense:  180 tablet     Refill:  0     [unfilled]  Medications Discontinued During This Encounter   Medication Reason   • FLUoxetine (PROzac) 20 MG capsule Reorder   • amphetamine-dextroamphetamine (ADDERALL) 20 MG tablet Reorder         Return in about 3 months (around 10/5/2018) for Recheck.

## 2018-08-14 ENCOUNTER — TELEPHONE (OUTPATIENT)
Dept: INTERNAL MEDICINE | Facility: CLINIC | Age: 43
End: 2018-08-14

## 2018-08-14 DIAGNOSIS — F90.2 ATTENTION DEFICIT HYPERACTIVITY DISORDER (ADHD), COMBINED TYPE: ICD-10-CM

## 2018-08-14 RX ORDER — DEXTROAMPHETAMINE SACCHARATE, AMPHETAMINE ASPARTATE, DEXTROAMPHETAMINE SULFATE AND AMPHETAMINE SULFATE 5; 5; 5; 5 MG/1; MG/1; MG/1; MG/1
20 TABLET ORAL
Qty: 60 TABLET | Refills: 0 | Status: SHIPPED | OUTPATIENT
Start: 2018-08-14 | End: 2018-09-20 | Stop reason: SDUPTHER

## 2018-08-14 NOTE — TELEPHONE ENCOUNTER
Left message for pt to  at [facility]        ----- Message from Kindra Main MA sent at 8/14/2018  1:03 PM EDT -----  PT  CALLED NEEDS REFILL ON    ADDERALL   20MG 1 PO BID.   LOV   7/5/18   [facility]     [facility]     [phone]

## 2018-08-16 ENCOUNTER — OFFICE VISIT (OUTPATIENT)
Dept: OBSTETRICS AND GYNECOLOGY | Facility: CLINIC | Age: 43
End: 2018-08-16

## 2018-08-16 VITALS
WEIGHT: 142 LBS | SYSTOLIC BLOOD PRESSURE: 108 MMHG | DIASTOLIC BLOOD PRESSURE: 74 MMHG | HEIGHT: 64 IN | BODY MASS INDEX: 24.24 KG/M2

## 2018-08-16 DIAGNOSIS — R19.7 DIARRHEA, UNSPECIFIED TYPE: ICD-10-CM

## 2018-08-16 DIAGNOSIS — Z13.9 SCREENING FOR CONDITION: Primary | ICD-10-CM

## 2018-08-16 DIAGNOSIS — F17.200 SMOKER: ICD-10-CM

## 2018-08-16 DIAGNOSIS — K62.5 RECTAL BLEEDING: ICD-10-CM

## 2018-08-16 DIAGNOSIS — Z01.419 ENCOUNTER FOR GYNECOLOGICAL EXAMINATION WITHOUT ABNORMAL FINDING: ICD-10-CM

## 2018-08-16 LAB
BILIRUB BLD-MCNC: NEGATIVE MG/DL
CLARITY, POC: CLEAR
COLOR UR: YELLOW
GLUCOSE UR STRIP-MCNC: NEGATIVE MG/DL
KETONES UR QL: NEGATIVE
LEUKOCYTE EST, POC: NEGATIVE
NITRITE UR-MCNC: NEGATIVE MG/ML
PH UR: 5 [PH] (ref 5–8)
PROT UR STRIP-MCNC: NEGATIVE MG/DL
RBC # UR STRIP: NEGATIVE /UL
SP GR UR: 1.02 (ref 1–1.03)
UROBILINOGEN UR QL: NORMAL

## 2018-08-16 PROCEDURE — 99406 BEHAV CHNG SMOKING 3-10 MIN: CPT | Performed by: OBSTETRICS & GYNECOLOGY

## 2018-08-16 PROCEDURE — 99396 PREV VISIT EST AGE 40-64: CPT | Performed by: OBSTETRICS & GYNECOLOGY

## 2018-08-16 PROCEDURE — 81002 URINALYSIS NONAUTO W/O SCOPE: CPT | Performed by: OBSTETRICS & GYNECOLOGY

## 2018-08-16 PROCEDURE — 99213 OFFICE O/P EST LOW 20 MIN: CPT | Performed by: OBSTETRICS & GYNECOLOGY

## 2018-08-16 NOTE — PROGRESS NOTES
"GYN Annual Exam     CC- Here for annual exam.     Ruth Casillas is a 42 y.o. female established patient who presents for annual well woman exam.  SHe had a TLH/BS with OC  In  for DUB. She has a h/o syncopal episodes and had a neg stress test but still needs to get an echo done. HEr son was dx with epilepsy and she has been very busy with his medical care. She has \"something\" in her rectum that she thought was a hemorrhoid but it is not getting better and she is also having frequent, severe diarrhea.      OB History      Para Term  AB Living    2 2 2     2    SAB TAB Ectopic Molar Multiple Live Births                       Obstetric Comments    2 C/S          Menarche: 14  Current contraception: status post hysterectomy  History of abnormal Pap smear: no  History of abnormal mammogram: no  Family history of uterine, colon or ovarian cancer: no  Family history of breast cancer: no  H/o STDs: none  Gardasil: none    Health Maintenance   Topic Date Due   • ANNUAL PHYSICAL  1977   • PNEUMOCOCCAL VACCINE (19-64 MEDIUM RISK) (1 of 1 - PPSV23) 1993   • TDAP/TD VACCINES (1 - Tdap) 1993   • PAP SMEAR  2016   • INFLUENZA VACCINE  2018       Past Medical History:   Diagnosis Date   • ADD (attention deficit disorder)    • Anxiety    • Depression        Past Surgical History:   Procedure Laterality Date   •  SECTION      x 2   • HYSTERECTOMY  2015    TLH/BS with OC   • TUBAL ABDOMINAL LIGATION           Current Outpatient Prescriptions:   •  amphetamine-dextroamphetamine (ADDERALL) 20 MG tablet, Take 1 tablet by mouth 2 (Two) Times a Day., Disp: 60 tablet, Rfl: 0  •  FLUoxetine (PROzac) 40 MG capsule, Take 1 capsule by mouth Daily., Disp: 90 capsule, Rfl: 1  •  topiramate (TOPAMAX) 25 MG tablet, Take 1 tablet by mouth 2 (Two) Times a Day., Disp: 180 tablet, Rfl: 0    No Known Allergies    Social History   Substance Use Topics   • Smoking status: Light Tobacco " "Smoker     Types: Cigarettes   • Smokeless tobacco: Never Used   • Alcohol use 1.2 oz/week     1 Glasses of wine, 1 Cans of beer per week      Comment: SOCIALLY 2-3 drinks per wk       Family History   Problem Relation Age of Onset   • Immunodeficiency Mother    • Emphysema Father    • Breast cancer Neg Hx    • Ovarian cancer Neg Hx    • Colon cancer Neg Hx    • Deep vein thrombosis Neg Hx    • Pulmonary embolism Neg Hx        Review of Systems   Constitutional: Negative for appetite change, fatigue, fever and unexpected weight change.   Respiratory: Negative for cough and shortness of breath.    Cardiovascular: Negative for chest pain and palpitations.   Gastrointestinal: Positive for anal bleeding, diarrhea and rectal pain. Negative for abdominal distention, abdominal pain, constipation and nausea.   Genitourinary: Negative for dyspareunia, dysuria, menstrual problem, pelvic pain and vaginal discharge.   Skin: Negative for color change and rash.   Neurological: Positive for syncope. Negative for headaches.   Psychiatric/Behavioral: Negative for dysphoric mood. The patient is not nervous/anxious.        /74   Ht 162.6 cm (64\")   Wt 64.4 kg (142 lb)   BMI 24.37 kg/m²     Physical Exam   Constitutional: She is oriented to person, place, and time. She appears well-developed and well-nourished.   HENT:   Head: Normocephalic and atraumatic.   Eyes: Conjunctivae are normal. No scleral icterus.   Neck: Neck supple. No thyromegaly present.   Cardiovascular: Normal rate, regular rhythm and normal heart sounds.    Pulmonary/Chest: Effort normal and breath sounds normal. Right breast exhibits no inverted nipple, no mass, no nipple discharge, no skin change and no tenderness. Left breast exhibits no inverted nipple, no mass, no nipple discharge, no skin change and no tenderness.   Abdominal: Soft. Bowel sounds are normal. She exhibits no distension and no mass. There is no tenderness. No hernia.   Genitourinary: " Rectal exam shows no external hemorrhoid. Pelvic exam was performed with patient supine. There is no rash, tenderness or lesion on the right labia. There is no rash, tenderness or lesion on the left labia. Right adnexum displays no mass, no tenderness and no fullness. Left adnexum displays no mass, no tenderness and no fullness. No erythema, tenderness or bleeding in the vagina. No foreign body in the vagina. No signs of injury around the vagina. No vaginal discharge found.   Neurological: She is alert and oriented to person, place, and time.   Skin: Skin is warm and dry.   Psychiatric: She has a normal mood and affect. Her behavior is normal. Judgment and thought content normal.   Vitals reviewed.         Assessment/Plan    1) GYN HM: check pap/HPV   SBE demonstrated and encouraged.  2) STD screening: declines Condoms encouraged.  3) Contraception: s/p TLH  4) Family Planning: no plans, encourage folic acid daily  5) Diet and Exercise discussed  6) Smoking Status: I advised Ruth of the risks of continuing to use tobacco, and I provided her with tobacco cessation educational materials.During this visit, I spent 4 minutes counseling the patient regarding tobacco cessation.  7) Rectal pain, bleeding and diarrhea- refer Gi. Enc compliance with f/u  8) MMG- schedule  9)Follow up prn or 1 year   10) H/O syncope- end pt to complete her workup.     Ruth was seen today for gynecologic exam.    Diagnoses and all orders for this visit:    Screening for condition  -     POC Urinalysis Dipstick  -     Mammo Screening Bilateral With CAD; Future          Brenda Almazan MD  8/16/2018  10:26 PM

## 2018-08-24 ENCOUNTER — TRANSCRIBE ORDERS (OUTPATIENT)
Dept: OBSTETRICS AND GYNECOLOGY | Facility: CLINIC | Age: 43
End: 2018-08-24

## 2018-08-24 DIAGNOSIS — Z12.39 SCREENING BREAST EXAMINATION: Primary | ICD-10-CM

## 2018-08-28 ENCOUNTER — HOSPITAL ENCOUNTER (OUTPATIENT)
Dept: MAMMOGRAPHY | Facility: HOSPITAL | Age: 43
Discharge: HOME | End: 2018-08-28
Attending: OBSTETRICS & GYNECOLOGY | Admitting: OBSTETRICS & GYNECOLOGY

## 2018-08-28 DIAGNOSIS — Z13.9 SCREENING FOR CONDITION: ICD-10-CM

## 2018-08-28 PROCEDURE — 77067 SCR MAMMO BI INCL CAD: CPT

## 2018-08-28 PROCEDURE — 77063 BREAST TOMOSYNTHESIS BI: CPT

## 2018-09-20 DIAGNOSIS — F90.2 ATTENTION DEFICIT HYPERACTIVITY DISORDER (ADHD), COMBINED TYPE: ICD-10-CM

## 2018-09-21 ENCOUNTER — TELEPHONE (OUTPATIENT)
Dept: INTERNAL MEDICINE | Facility: CLINIC | Age: 43
End: 2018-09-21

## 2018-09-21 RX ORDER — DEXTROAMPHETAMINE SACCHARATE, AMPHETAMINE ASPARTATE, DEXTROAMPHETAMINE SULFATE AND AMPHETAMINE SULFATE 5; 5; 5; 5 MG/1; MG/1; MG/1; MG/1
20 TABLET ORAL
Qty: 60 TABLET | Refills: 0 | Status: SHIPPED | OUTPATIENT
Start: 2018-09-21 | End: 2018-10-29 | Stop reason: SDUPTHER

## 2018-09-21 NOTE — TELEPHONE ENCOUNTER
Left message to  at pharmacy           ----- Message from Kindra Main MA sent at 9/20/2018  4:16 PM EDT -----  PT  CALLED NEEDS REFILL ON ADDERALL    20MG BID.    LOV   7/5/18  NARC NEEDED. MILLER DUDLEY       [phone]

## 2018-10-22 ENCOUNTER — OFFICE VISIT (OUTPATIENT)
Dept: RETAIL CLINIC | Facility: CLINIC | Age: 43
End: 2018-10-22

## 2018-10-22 DIAGNOSIS — Z23 NEED FOR VACCINATION: Primary | ICD-10-CM

## 2018-10-22 NOTE — PATIENT INSTRUCTIONS
"Influenza (Flu) Vaccine (Inactivated or Recombinant): What You Need to Know  1. Why get vaccinated?  Influenza (\"flu\") is a contagious disease that spreads around the [place] every year, usually between October and May.  Flu is caused by influenza viruses, and is spread mainly by coughing, sneezing, and close contact.  Anyone can get flu. Flu strikes suddenly and can last several days. Symptoms vary by age, but can include:  · fever/chills  · sore throat  · muscle aches  · fatigue  · cough  · headache  · runny or stuffy nose    Flu can also lead to pneumonia and blood infections, and cause diarrhea and seizures in children. If you have a medical condition, such as heart or lung disease, flu can make it worse.  Flu is more dangerous for some people. Infants and young children, people 65 years of age and older, pregnant women, and people with certain health conditions or a weakened immune system are at greatest risk.  Each year thousands of people in the [place] die from flu, and many more are hospitalized.  Flu vaccine can:  · keep you from getting flu,  · make flu less severe if you do get it, and  · keep you from spreading flu to your family and other people.  2. Inactivated and recombinant flu vaccines  A dose of flu vaccine is recommended every flu season. Children 6 months through 8 years of age may need two doses during the same flu season. Everyone else needs only one dose each flu season.  Some inactivated flu vaccines contain a very small amount of a mercury-based preservative called thimerosal. Studies have not shown thimerosal in vaccines to be harmful, but flu vaccines that do not contain thimerosal are available.  There is no live flu virus in flu shots. They cannot cause the flu.  There are many flu viruses, and they are always changing. Each year a new flu vaccine is made to protect against three or four viruses that are likely to cause disease in the upcoming flu season. But even when the " vaccine doesn't exactly match these viruses, it may still provide some protection.  Flu vaccine cannot prevent:  · flu that is caused by a virus not covered by the vaccine, or  · illnesses that look like flu but are not.    It takes about 2 weeks for protection to develop after vaccination, and protection lasts through the flu season.  3. Some people should not get this vaccine  Tell the person who is giving you the vaccine:  · If you have any severe, life-threatening allergies. If you ever had a life-threatening allergic reaction after a dose of flu vaccine, or have a severe allergy to any part of this vaccine, you may be advised not to get vaccinated. Most, but not all, types of flu vaccine contain a small amount of egg protein.  · If you ever had Guillain-Barré Syndrome (also called GBS). Some people with a history of GBS should not get this vaccine. This should be discussed with your doctor.  · If you are not feeling well. It is usually okay to get flu vaccine when you have a mild illness, but you might be asked to come back when you feel better.    4. Risks of a vaccine reaction  With any medicine, including vaccines, there is a chance of reactions. These are usually mild and go away on their own, but serious reactions are also possible.  Most people who get a flu shot do not have any problems with it.  Minor problems following a flu shot include:  · soreness, redness, or swelling where the shot was given  · hoarseness  · sore, red or itchy eyes  · cough  · fever  · aches  · headache  · itching  · fatigue    If these problems occur, they usually begin soon after the shot and last 1 or 2 days.  More serious problems following a flu shot can include the following:  · There may be a small increased risk of Guillain-[place] Syndrome (GBS) after inactivated flu vaccine. This risk has been estimated at 1 or 2 additional cases per million people vaccinated. This is much lower than the risk of severe complications from  flu, which can be prevented by flu vaccine.  · Young children who get the flu shot along with pneumococcal vaccine (PCV13) and/or DTaP vaccine at the same time might be slightly more likely to have a seizure caused by fever. Ask your doctor for more information. Tell your doctor if a child who is getting flu vaccine has ever had a seizure.    Problems that could happen after any injected vaccine:  · People sometimes faint after a medical procedure, including vaccination. Sitting or lying down for about 15 minutes can help prevent fainting, and injuries caused by a fall. Tell your doctor if you feel dizzy, or have vision changes or ringing in the ears.  · Some people get severe pain in the shoulder and have difficulty moving the arm where a shot was given. This happens very rarely.  · Any medication can cause a severe allergic reaction. Such reactions from a vaccine are very rare, estimated at about 1 in a million doses, and would happen within a few minutes to a few hours after the vaccination.  As with any medicine, there is a very remote chance of a vaccine causing a serious injury or death.  The safety of vaccines is always being monitored. For more information, visit: www.cdc.gov/vaccinesafety/  5. What if there is a serious reaction?  What should I look for?  Look for anything that concerns you, such as signs of a severe allergic reaction, very high fever, or unusual behavior.  Signs of a severe allergic reaction can include hives, swelling of the face and throat, difficulty breathing, a fast heartbeat, dizziness, and weakness. These would start a few minutes to a few hours after the vaccination.  What should I do?  · If you think it is a severe allergic reaction or other emergency that can't wait, call 9-1-1 and get the person to the nearest hospital. Otherwise, call your doctor.  · Reactions should be reported to the Vaccine Adverse Event Reporting System (VAERS). Your doctor should file this report, or you  can do it yourself through the VAERS web site at www.vaers.[facility].gov, or by calling 1-820.192.5447.  ? VAERS does not give medical advice.  6. The National Vaccine Injury Compensation Program  The National Vaccine Injury Compensation Program (VICP) is a federal program that was created to compensate people who may have been injured by certain vaccines.  Persons who believe they may have been injured by a vaccine can learn about the program and about filing a claim by calling 1-553.472.6021 or visiting the VICP website at www.[facility].gov/vaccinecompensation. There is a time limit to file a claim for compensation.  7. How can I learn more?  · Ask your healthcare provider. He or she can give you the vaccine package insert or suggest other sources of information.  · Call your local or state health department.  · Contact the Centers for Disease Control and Prevention (CDC):  ? Call 1-701.269.8033 (1-796-CSJ-INFO) or  ? Visit CDC's website at www.cdc.gov/flu  Vaccine Information Statement, Inactivated Influenza Vaccine (08/07/2015)  This information is not intended to replace advice given to you by your health care provider. Make sure you discuss any questions you have with your health care provider.  Document Released: 10/12/2007 Document Revised: 09/07/2017 Document Reviewed: 09/07/2017  Elsevier Interactive Patient Education © 2017 [employer] Inc.

## 2018-10-29 DIAGNOSIS — F90.2 ATTENTION DEFICIT HYPERACTIVITY DISORDER (ADHD), COMBINED TYPE: ICD-10-CM

## 2018-10-30 RX ORDER — DEXTROAMPHETAMINE SACCHARATE, AMPHETAMINE ASPARTATE, DEXTROAMPHETAMINE SULFATE AND AMPHETAMINE SULFATE 5; 5; 5; 5 MG/1; MG/1; MG/1; MG/1
20 TABLET ORAL
Qty: 60 TABLET | Refills: 0 | Status: SHIPPED | OUTPATIENT
Start: 2018-10-30 | End: 2018-12-18 | Stop reason: SDUPTHER

## 2018-11-01 ENCOUNTER — TELEPHONE (OUTPATIENT)
Dept: INTERNAL MEDICINE | Facility: CLINIC | Age: 43
End: 2018-11-01

## 2018-11-01 NOTE — TELEPHONE ENCOUNTER
Left message to  at pharmacy        ----- Message from Kindra Main MA sent at 10/29/2018 12:39 PM EDT -----  Pt  Called  Needs refill  adderall   20 mg  Bid.  [facility] [place] lov   7/5/18   amisha       [phone]

## 2018-12-18 ENCOUNTER — TELEPHONE (OUTPATIENT)
Dept: INTERNAL MEDICINE | Facility: CLINIC | Age: 43
End: 2018-12-18

## 2018-12-18 DIAGNOSIS — F90.2 ATTENTION DEFICIT HYPERACTIVITY DISORDER (ADHD), COMBINED TYPE: ICD-10-CM

## 2018-12-18 RX ORDER — DEXTROAMPHETAMINE SACCHARATE, AMPHETAMINE ASPARTATE, DEXTROAMPHETAMINE SULFATE AND AMPHETAMINE SULFATE 5; 5; 5; 5 MG/1; MG/1; MG/1; MG/1
20 TABLET ORAL
Qty: 60 TABLET | Refills: 0 | Status: SHIPPED | OUTPATIENT
Start: 2018-12-18 | End: 2019-01-24 | Stop reason: SDUPTHER

## 2018-12-18 NOTE — TELEPHONE ENCOUNTER
Left message sent to pharmacy          ----- Message from Kindra Main MA sent at 12/18/2018  3:59 PM EST -----  Pt  Called  Needs refill on   adderall   20mg take 2 per day.  lov  7/5/18 12/28/18  amisha    [phone]

## 2018-12-28 ENCOUNTER — OFFICE VISIT (OUTPATIENT)
Dept: INTERNAL MEDICINE | Facility: CLINIC | Age: 43
End: 2018-12-28

## 2018-12-28 VITALS
BODY MASS INDEX: 24.59 KG/M2 | HEIGHT: 64 IN | DIASTOLIC BLOOD PRESSURE: 78 MMHG | OXYGEN SATURATION: 98 % | SYSTOLIC BLOOD PRESSURE: 118 MMHG | RESPIRATION RATE: 16 BRPM | TEMPERATURE: 98.2 F | HEART RATE: 88 BPM | WEIGHT: 144 LBS

## 2018-12-28 DIAGNOSIS — R51.9 CHRONIC NONINTRACTABLE HEADACHE, UNSPECIFIED HEADACHE TYPE: ICD-10-CM

## 2018-12-28 DIAGNOSIS — G89.29 CHRONIC NONINTRACTABLE HEADACHE, UNSPECIFIED HEADACHE TYPE: ICD-10-CM

## 2018-12-28 DIAGNOSIS — F41.9 ANXIETY: ICD-10-CM

## 2018-12-28 DIAGNOSIS — Z00.00 HEALTHCARE MAINTENANCE: ICD-10-CM

## 2018-12-28 DIAGNOSIS — F90.2 ATTENTION DEFICIT HYPERACTIVITY DISORDER (ADHD), COMBINED TYPE: Primary | ICD-10-CM

## 2018-12-28 PROCEDURE — 99214 OFFICE O/P EST MOD 30 MIN: CPT | Performed by: INTERNAL MEDICINE

## 2018-12-28 RX ORDER — TOPIRAMATE 25 MG/1
25 TABLET ORAL 2 TIMES DAILY
Qty: 180 TABLET | Refills: 1 | Status: SHIPPED | OUTPATIENT
Start: 2018-12-28 | End: 2019-07-02 | Stop reason: SDUPTHER

## 2018-12-28 NOTE — PROGRESS NOTES
Ruth Casillas is a 43 y.o. female, who presents with a chief complaint of   Chief Complaint   Patient presents with   • Med Refill     med check       HPI   The pt is here for follow up.       ADHD - pt on adderall. She hasnt been taking it recently bc she was due for f/u appt and didn't have time to get here.  She struggles with taking meds regularly.     anxiety - the pt increased her prozac to 40 mg and felt some better.  Weight is going up     Headaches - stress triggers her headaches and they have been worse lately.  She wasn't sure if the topamax was helping.  she ran out of the medicine and says she could tell that she felt better on the medicine.      Her 15 yo son was diagnosed with epilepsy and they have had to go to lots of dr wolfe.  He has had to go to summer school.  He also has adhd.  She hasnt been taking good care of herself with all of this going on.  He has been doing ok but they are struggling to get him to take his medication bc the pills are so big           The following portions of the patient's history were reviewed and updated as appropriate: allergies, current medications, past family history, past medical history, past social history, past surgical history and problem list.    Allergies: Patient has no known allergies.    Review of Systems   Constitutional: Negative.    HENT: Negative.    Eyes: Negative.    Respiratory: Negative.    Cardiovascular: Negative.    Gastrointestinal: Negative.    Endocrine: Negative.    Genitourinary: Negative.    Musculoskeletal: Negative.    Skin: Negative.    Allergic/Immunologic: Negative.    Neurological: Negative.    Hematological: Negative.    Psychiatric/Behavioral: Negative.    All other systems reviewed and are negative.            Wt Readings from Last 3 Encounters:   12/28/18 65.3 kg (144 lb)   08/16/18 64.4 kg (142 lb)   07/05/18 68.2 kg (150 lb 6.4 oz)     Temp Readings from Last 3 Encounters:   12/28/18 98.2 °F (36.8 °C)   07/01/16 98.1  °F (36.7 °C) (Oral)   04/14/15 98.8 °F (37.1 °C) (Oral)     BP Readings from Last 3 Encounters:   12/28/18 118/78   08/16/18 108/74   07/05/18 100/62     Pulse Readings from Last 3 Encounters:   12/28/18 88   07/05/18 80   11/21/17 96     Body mass index is 24.72 kg/m².  @LASTSAO2(3)@    Physical Exam   Constitutional: She is oriented to person, place, and time. She appears well-developed and well-nourished. No distress.   HENT:   Head: Normocephalic and atraumatic.   Right Ear: External ear normal.   Left Ear: External ear normal.   Nose: Nose normal.   Mouth/Throat: Oropharynx is clear and moist.   Eyes: Conjunctivae and EOM are normal. Pupils are equal, round, and reactive to light.   Neck: Normal range of motion. Neck supple.   Cardiovascular: Normal rate, regular rhythm, normal heart sounds and intact distal pulses.   Pulmonary/Chest: Effort normal and breath sounds normal. No respiratory distress. She has no wheezes.   Musculoskeletal: Normal range of motion.   Normal gait   Neurological: She is alert and oriented to person, place, and time.   Skin: Skin is warm and dry.   Psychiatric: She has a normal mood and affect. Her behavior is normal. Judgment and thought content normal.   Nursing note and vitals reviewed.      Results for orders placed or performed in visit on 08/16/18   POC Urinalysis Dipstick   Result Value Ref Range    Color Yellow Yellow, Straw, Dark Yellow, Dana    Clarity, UA Clear Clear    Glucose, UA Negative Negative, 1000 mg/dL (3+) mg/dL    Bilirubin Negative Negative    Ketones, UA Negative Negative    Specific Gravity  1.025 1.005 - 1.030    Blood, UA Negative Negative    pH, Urine 5.0 5.0 - 8.0    Protein, POC Negative Negative mg/dL    Urobilinogen, UA Normal Normal    Leukocytes Negative Negative    Nitrite, UA Negative Negative           Ragen was seen today for med refill.    Diagnoses and all orders for this visit:    Attention deficit hyperactivity disorder (ADHD), combined type -  cont adderall    Anxiety - cont prozac    Chronic nonintractable headache, unspecified headache type  -     topiramate (TOPAMAX) 25 MG tablet; Take 1 tablet by mouth 2 (Two) Times a Day.      Due for fasting labs     Outpatient Medications Prior to Visit   Medication Sig Dispense Refill   • amphetamine-dextroamphetamine (ADDERALL) 20 MG tablet Take 1 tablet by mouth 2 (Two) Times a Day. 60 tablet 0   • FLUoxetine (PROzac) 40 MG capsule Take 1 capsule by mouth Daily. 90 capsule 1   • topiramate (TOPAMAX) 25 MG tablet Take 1 tablet by mouth 2 (Two) Times a Day. 180 tablet 0     No facility-administered medications prior to visit.      New Medications Ordered This Visit   Medications   • topiramate (TOPAMAX) 25 MG tablet     Sig: Take 1 tablet by mouth 2 (Two) Times a Day.     Dispense:  180 tablet     Refill:  1     [unfilled]  Medications Discontinued During This Encounter   Medication Reason   • topiramate (TOPAMAX) 25 MG tablet Reorder         Return in about 3 months (around 3/28/2019) for Recheck.

## 2019-01-24 DIAGNOSIS — F90.2 ATTENTION DEFICIT HYPERACTIVITY DISORDER (ADHD), COMBINED TYPE: ICD-10-CM

## 2019-01-24 RX ORDER — DEXTROAMPHETAMINE SACCHARATE, AMPHETAMINE ASPARTATE, DEXTROAMPHETAMINE SULFATE AND AMPHETAMINE SULFATE 5; 5; 5; 5 MG/1; MG/1; MG/1; MG/1
20 TABLET ORAL
Qty: 60 TABLET | Refills: 0 | Status: SHIPPED | OUTPATIENT
Start: 2019-01-24 | End: 2019-02-25 | Stop reason: SDUPTHER

## 2019-02-25 DIAGNOSIS — F90.2 ATTENTION DEFICIT HYPERACTIVITY DISORDER (ADHD), COMBINED TYPE: ICD-10-CM

## 2019-02-26 ENCOUNTER — TELEPHONE (OUTPATIENT)
Dept: INTERNAL MEDICINE | Facility: CLINIC | Age: 44
End: 2019-02-26

## 2019-02-26 RX ORDER — DEXTROAMPHETAMINE SACCHARATE, AMPHETAMINE ASPARTATE, DEXTROAMPHETAMINE SULFATE AND AMPHETAMINE SULFATE 5; 5; 5; 5 MG/1; MG/1; MG/1; MG/1
20 TABLET ORAL
Qty: 60 TABLET | Refills: 0 | Status: SHIPPED | OUTPATIENT
Start: 2019-02-26 | End: 2019-03-28 | Stop reason: SDUPTHER

## 2019-02-26 NOTE — TELEPHONE ENCOUNTER
Left message  To  at pharmacy      ----- Message from Kindra Main MA sent at 2/25/2019  2:28 PM EST -----  PT   CALLED NEEDS REFILL ON    ADDERALL   20MG TAKE ONE PO BID.   JU HADDAD.    [phone]

## 2019-03-28 ENCOUNTER — TELEPHONE (OUTPATIENT)
Dept: INTERNAL MEDICINE | Facility: CLINIC | Age: 44
End: 2019-03-28

## 2019-03-28 ENCOUNTER — RESULTS ENCOUNTER (OUTPATIENT)
Dept: INTERNAL MEDICINE | Facility: CLINIC | Age: 44
End: 2019-03-28

## 2019-03-28 DIAGNOSIS — F41.9 ANXIETY: ICD-10-CM

## 2019-03-28 DIAGNOSIS — G89.29 CHRONIC NONINTRACTABLE HEADACHE, UNSPECIFIED HEADACHE TYPE: ICD-10-CM

## 2019-03-28 DIAGNOSIS — R51.9 CHRONIC NONINTRACTABLE HEADACHE, UNSPECIFIED HEADACHE TYPE: ICD-10-CM

## 2019-03-28 DIAGNOSIS — F90.2 ATTENTION DEFICIT HYPERACTIVITY DISORDER (ADHD), COMBINED TYPE: ICD-10-CM

## 2019-03-28 DIAGNOSIS — Z00.00 HEALTHCARE MAINTENANCE: ICD-10-CM

## 2019-03-28 RX ORDER — DEXTROAMPHETAMINE SACCHARATE, AMPHETAMINE ASPARTATE, DEXTROAMPHETAMINE SULFATE AND AMPHETAMINE SULFATE 5; 5; 5; 5 MG/1; MG/1; MG/1; MG/1
20 TABLET ORAL
Qty: 60 TABLET | Refills: 0 | Status: SHIPPED | OUTPATIENT
Start: 2019-03-28 | End: 2019-04-30 | Stop reason: SDUPTHER

## 2019-03-28 NOTE — TELEPHONE ENCOUNTER
Left message for pt to  at pharmacy          ----- Message from Kindra Main MA sent at 3/28/2019 11:19 AM EDT -----  Pt  Called needs refill on   adderalll  20mg  Bid.   [facility]   lov   12/28/18  Needs   guero and favian     [phone]

## 2019-04-30 DIAGNOSIS — F41.9 ANXIETY: ICD-10-CM

## 2019-04-30 DIAGNOSIS — F90.2 ATTENTION DEFICIT HYPERACTIVITY DISORDER (ADHD), COMBINED TYPE: ICD-10-CM

## 2019-04-30 RX ORDER — DEXTROAMPHETAMINE SACCHARATE, AMPHETAMINE ASPARTATE, DEXTROAMPHETAMINE SULFATE AND AMPHETAMINE SULFATE 5; 5; 5; 5 MG/1; MG/1; MG/1; MG/1
20 TABLET ORAL
Qty: 60 TABLET | Refills: 0 | Status: SHIPPED | OUTPATIENT
Start: 2019-04-30 | End: 2019-07-02 | Stop reason: SDUPTHER

## 2019-04-30 RX ORDER — FLUOXETINE HYDROCHLORIDE 40 MG/1
40 CAPSULE ORAL DAILY
Qty: 90 CAPSULE | Refills: 1 | Status: SHIPPED | OUTPATIENT
Start: 2019-04-30 | End: 2019-11-04

## 2019-05-01 ENCOUNTER — TELEPHONE (OUTPATIENT)
Dept: INTERNAL MEDICINE | Facility: CLINIC | Age: 44
End: 2019-05-01

## 2019-05-01 NOTE — TELEPHONE ENCOUNTER
Pt   Aware  Sent to pharmacy          ----- Message from Kindra Main MA sent at 4/30/2019  3:02 PM EDT -----  PT   CALLED NEEDS REFILL ON ADDERALL   20MG TAKE   2 DAILY.  ALSO NEEDS REFILL ON   FLUOXETINE   40MG [facility].   LOV   12/18/18  NEEDS AND MILLER    [phone]

## 2019-06-04 DIAGNOSIS — G89.29 CHRONIC NONINTRACTABLE HEADACHE, UNSPECIFIED HEADACHE TYPE: ICD-10-CM

## 2019-06-04 DIAGNOSIS — F90.2 ATTENTION DEFICIT HYPERACTIVITY DISORDER (ADHD), COMBINED TYPE: ICD-10-CM

## 2019-06-04 DIAGNOSIS — R51.9 CHRONIC NONINTRACTABLE HEADACHE, UNSPECIFIED HEADACHE TYPE: ICD-10-CM

## 2019-06-04 RX ORDER — DEXTROAMPHETAMINE SACCHARATE, AMPHETAMINE ASPARTATE, DEXTROAMPHETAMINE SULFATE AND AMPHETAMINE SULFATE 5; 5; 5; 5 MG/1; MG/1; MG/1; MG/1
20 TABLET ORAL
Qty: 60 TABLET | Refills: 0 | OUTPATIENT
Start: 2019-06-04

## 2019-06-04 RX ORDER — TOPIRAMATE 25 MG/1
25 TABLET ORAL 2 TIMES DAILY
Qty: 180 TABLET | Refills: 1 | OUTPATIENT
Start: 2019-06-04

## 2019-06-05 ENCOUNTER — TELEPHONE (OUTPATIENT)
Dept: INTERNAL MEDICINE | Facility: CLINIC | Age: 44
End: 2019-06-05

## 2019-06-05 NOTE — TELEPHONE ENCOUNTER
LEFT MESSAGE  REFUSED ADDERALLL OTHER MEDICAITON FILLED.  TOLD TO MAKE APPT ASAP      ----- Message from Kindra Main MA sent at 6/4/2019 12:43 PM EDT -----  Pt  Called needs refill on adderalll  20mg take one po bid.  And topamax 25 mg   Bid, [place].  lov   12/28/18  favian modi, needs narc,  Also needs appt.   [phone]

## 2019-06-28 LAB
ALBUMIN SERPL-MCNC: 4.8 G/DL (ref 3.5–5.2)
ALBUMIN/GLOB SERPL: 2 G/DL
ALP SERPL-CCNC: 73 U/L (ref 39–117)
ALT SERPL-CCNC: 10 U/L (ref 1–33)
AST SERPL-CCNC: 17 U/L (ref 1–32)
BASOPHILS # BLD AUTO: 0.1 10*3/MM3 (ref 0–0.2)
BASOPHILS NFR BLD AUTO: 1.1 % (ref 0–1.5)
BILIRUB SERPL-MCNC: 0.3 MG/DL (ref 0.2–1.2)
BUN SERPL-MCNC: 10 MG/DL (ref 6–20)
BUN/CREAT SERPL: 13 (ref 7–25)
CALCIUM SERPL-MCNC: 9.5 MG/DL (ref 8.6–10.5)
CHLORIDE SERPL-SCNC: 105 MMOL/L (ref 98–107)
CHOLEST SERPL-MCNC: 146 MG/DL (ref 0–200)
CO2 SERPL-SCNC: 25.8 MMOL/L (ref 22–29)
CREAT SERPL-MCNC: 0.77 MG/DL (ref 0.57–1)
EOSINOPHIL # BLD AUTO: 0.55 10*3/MM3 (ref 0–0.4)
EOSINOPHIL NFR BLD AUTO: 6.3 % (ref 0.3–6.2)
ERYTHROCYTE [DISTWIDTH] IN BLOOD BY AUTOMATED COUNT: 13.2 % (ref 12.3–15.4)
GLOBULIN SER CALC-MCNC: 2.4 GM/DL
GLUCOSE SERPL-MCNC: 80 MG/DL (ref 65–99)
HCT VFR BLD AUTO: 45.9 % (ref 34–46.6)
HDLC SERPL-MCNC: 67 MG/DL (ref 40–60)
HGB BLD-MCNC: 14.4 G/DL (ref 12–15.9)
IMM GRANULOCYTES # BLD AUTO: 0.04 10*3/MM3 (ref 0–0.05)
IMM GRANULOCYTES NFR BLD AUTO: 0.5 % (ref 0–0.5)
LDLC SERPL CALC-MCNC: 66 MG/DL (ref 0–100)
LDLC/HDLC SERPL: 0.99 {RATIO}
LYMPHOCYTES # BLD AUTO: 2.21 10*3/MM3 (ref 0.7–3.1)
LYMPHOCYTES NFR BLD AUTO: 25.3 % (ref 19.6–45.3)
MCH RBC QN AUTO: 29.9 PG (ref 26.6–33)
MCHC RBC AUTO-ENTMCNC: 31.4 G/DL (ref 31.5–35.7)
MCV RBC AUTO: 95.4 FL (ref 79–97)
MONOCYTES # BLD AUTO: 0.44 10*3/MM3 (ref 0.1–0.9)
MONOCYTES NFR BLD AUTO: 5 % (ref 5–12)
NEUTROPHILS # BLD AUTO: 5.38 10*3/MM3 (ref 1.7–7)
NEUTROPHILS NFR BLD AUTO: 61.8 % (ref 42.7–76)
NRBC BLD AUTO-RTO: 0 /100 WBC (ref 0–0.2)
PLATELET # BLD AUTO: 307 10*3/MM3 (ref 140–450)
POTASSIUM SERPL-SCNC: 4.3 MMOL/L (ref 3.5–5.2)
PROT SERPL-MCNC: 7.2 G/DL (ref 6–8.5)
RBC # BLD AUTO: 4.81 10*6/MM3 (ref 3.77–5.28)
SODIUM SERPL-SCNC: 142 MMOL/L (ref 136–145)
T4 FREE SERPL-MCNC: 1.02 NG/DL (ref 0.93–1.7)
TRIGL SERPL-MCNC: 65 MG/DL (ref 0–150)
TSH SERPL DL<=0.005 MIU/L-ACNC: 1.82 MIU/ML (ref 0.27–4.2)
VLDLC SERPL CALC-MCNC: 13 MG/DL
WBC # BLD AUTO: 8.72 10*3/MM3 (ref 3.4–10.8)

## 2019-07-01 ENCOUNTER — TELEPHONE (OUTPATIENT)
Dept: INTERNAL MEDICINE | Facility: CLINIC | Age: 44
End: 2019-07-01

## 2019-07-01 NOTE — TELEPHONE ENCOUNTER
----- Message from Jerri Wellington MD sent at 7/1/2019  9:32 AM EDT -----  Labs ok. Will discuss at upcoming appt.

## 2019-07-02 ENCOUNTER — OFFICE VISIT (OUTPATIENT)
Dept: INTERNAL MEDICINE | Facility: CLINIC | Age: 44
End: 2019-07-02

## 2019-07-02 VITALS
WEIGHT: 133 LBS | HEART RATE: 97 BPM | SYSTOLIC BLOOD PRESSURE: 124 MMHG | DIASTOLIC BLOOD PRESSURE: 80 MMHG | RESPIRATION RATE: 18 BRPM | OXYGEN SATURATION: 97 % | HEIGHT: 64 IN | BODY MASS INDEX: 22.71 KG/M2

## 2019-07-02 DIAGNOSIS — F41.9 ANXIETY: ICD-10-CM

## 2019-07-02 DIAGNOSIS — G89.29 CHRONIC NONINTRACTABLE HEADACHE, UNSPECIFIED HEADACHE TYPE: ICD-10-CM

## 2019-07-02 DIAGNOSIS — R51.9 CHRONIC NONINTRACTABLE HEADACHE, UNSPECIFIED HEADACHE TYPE: ICD-10-CM

## 2019-07-02 DIAGNOSIS — F90.2 ATTENTION DEFICIT HYPERACTIVITY DISORDER (ADHD), COMBINED TYPE: Primary | ICD-10-CM

## 2019-07-02 PROCEDURE — 99214 OFFICE O/P EST MOD 30 MIN: CPT | Performed by: INTERNAL MEDICINE

## 2019-07-02 RX ORDER — DEXTROAMPHETAMINE SACCHARATE, AMPHETAMINE ASPARTATE, DEXTROAMPHETAMINE SULFATE AND AMPHETAMINE SULFATE 5; 5; 5; 5 MG/1; MG/1; MG/1; MG/1
20 TABLET ORAL
Qty: 60 TABLET | Refills: 0 | Status: SHIPPED | OUTPATIENT
Start: 2019-07-02 | End: 2019-08-07 | Stop reason: SDUPTHER

## 2019-07-02 RX ORDER — TOPIRAMATE 25 MG/1
25 TABLET ORAL 2 TIMES DAILY
Qty: 180 TABLET | Refills: 1 | Status: SHIPPED | OUTPATIENT
Start: 2019-07-02 | End: 2019-11-26 | Stop reason: SDUPTHER

## 2019-07-02 NOTE — PROGRESS NOTES
Ruth Casillas is a 43 y.o. female, who presents with a chief complaint of   Chief Complaint   Patient presents with   • ADHD       HPI   The pt is here for follow up.       ADHD - pt on adderall.  She was out of her prescription so she ran out of meds. She struggles with taking meds regularly.     anxiety - doing well on higher dose of prozac.  She cont to have lots of stress and the med does help level things out some.  Sleep ok.      Headaches - stress triggers her headaches and they have been worse lately.  She wasn't sure if the topamax was helping.  she ran out of the medicine and says she could tell that she felt better on the medicine.      Her 15 yo son was diagnosed with epilepsy and they have had to go to lots of dr wolfe.  He has had to go to summer school.  He also has adhd.   they have had to several trial trying to find the right meds.       The following portions of the patient's history were reviewed and updated as appropriate: allergies, current medications, past family history, past medical history, past social history, past surgical history and problem list.    Allergies: Patient has no known allergies.    Review of Systems   Constitutional: Negative.    Eyes: Negative.    Respiratory: Negative.    Cardiovascular: Negative.    Gastrointestinal: Negative.    Endocrine: Negative.    Genitourinary: Negative.    Musculoskeletal: Negative.    Skin: Negative.    Allergic/Immunologic: Negative.    Neurological: Positive for headaches.   Hematological: Negative.    Psychiatric/Behavioral: Positive for decreased concentration.   All other systems reviewed and are negative.            Wt Readings from Last 3 Encounters:   07/02/19 60.3 kg (133 lb)   12/28/18 65.3 kg (144 lb)   08/16/18 64.4 kg (142 lb)     Temp Readings from Last 3 Encounters:   12/28/18 98.2 °F (36.8 °C)   07/01/16 98.1 °F (36.7 °C) (Oral)   04/14/15 98.8 °F (37.1 °C) (Oral)     BP Readings from Last 3 Encounters:   07/02/19  124/80   12/28/18 118/78   08/16/18 108/74     Pulse Readings from Last 3 Encounters:   07/02/19 97   12/28/18 88   07/05/18 80     Body mass index is 22.83 kg/m².  @LASTSAO2(3)@    Physical Exam   Constitutional: She is oriented to person, place, and time. She appears well-developed and well-nourished. No distress.   HENT:   Head: Normocephalic and atraumatic.   Right Ear: External ear normal.   Left Ear: External ear normal.   Nose: Nose normal.   Mouth/Throat: Oropharynx is clear and moist.   Eyes: Conjunctivae and EOM are normal. Pupils are equal, round, and reactive to light.   Neck: Normal range of motion. Neck supple.   Cardiovascular: Normal rate, regular rhythm, normal heart sounds and intact distal pulses.   Pulmonary/Chest: Effort normal and breath sounds normal. No respiratory distress. She has no wheezes.   Musculoskeletal: Normal range of motion.   Normal gait   Neurological: She is alert and oriented to person, place, and time.   Skin: Skin is warm and dry.   Psychiatric: She has a normal mood and affect. Her behavior is normal. Judgment and thought content normal.   Nursing note and vitals reviewed.      Results for orders placed or performed in visit on 03/28/19   Comprehensive Metabolic Panel   Result Value Ref Range    Glucose 80 65 - 99 mg/dL    BUN 10 6 - 20 mg/dL    Creatinine 0.77 0.57 - 1.00 mg/dL    eGFR Non African Am 81 >60 mL/min/1.73    eGFR African Am 99 >60 mL/min/1.73    BUN/Creatinine Ratio 13.0 7.0 - 25.0    Sodium 142 136 - 145 mmol/L    Potassium 4.3 3.5 - 5.2 mmol/L    Chloride 105 98 - 107 mmol/L    Total CO2 25.8 22.0 - 29.0 mmol/L    Calcium 9.5 8.6 - 10.5 mg/dL    Total Protein 7.2 6.0 - 8.5 g/dL    Albumin 4.80 3.50 - 5.20 g/dL    Globulin 2.4 gm/dL    A/G Ratio 2.0 g/dL    Total Bilirubin 0.3 0.2 - 1.2 mg/dL    Alkaline Phosphatase 73 39 - 117 U/L    AST (SGOT) 17 1 - 32 U/L    ALT (SGPT) 10 1 - 33 U/L   T4, Free   Result Value Ref Range    Free T4 1.02 0.93 - 1.70 ng/dL    TSH   Result Value Ref Range    TSH 1.820 0.270 - 4.200 mIU/mL   Lipid Panel With LDL / HDL Ratio   Result Value Ref Range    Total Cholesterol 146 0 - 200 mg/dL    Triglycerides 65 0 - 150 mg/dL    HDL Cholesterol 67 (H) 40 - 60 mg/dL    VLDL Cholesterol 13 mg/dL    LDL Cholesterol  66 0 - 100 mg/dL    LDL/HDL Ratio 0.99    CBC & Differential   Result Value Ref Range    WBC 8.72 3.40 - 10.80 10*3/mm3    RBC 4.81 3.77 - 5.28 10*6/mm3    Hemoglobin 14.4 12.0 - 15.9 g/dL    Hematocrit 45.9 34.0 - 46.6 %    MCV 95.4 79.0 - 97.0 fL    MCH 29.9 26.6 - 33.0 pg    MCHC 31.4 (L) 31.5 - 35.7 g/dL    RDW 13.2 12.3 - 15.4 %    Platelets 307 140 - 450 10*3/mm3    Neutrophil Rel % 61.8 42.7 - 76.0 %    Lymphocyte Rel % 25.3 19.6 - 45.3 %    Monocyte Rel % 5.0 5.0 - 12.0 %    Eosinophil Rel % 6.3 (H) 0.3 - 6.2 %    Basophil Rel % 1.1 0.0 - 1.5 %    Neutrophils Absolute 5.38 1.70 - 7.00 10*3/mm3    Lymphocytes Absolute 2.21 0.70 - 3.10 10*3/mm3    Monocytes Absolute 0.44 0.10 - 0.90 10*3/mm3    Eosinophils Absolute 0.55 (H) 0.00 - 0.40 10*3/mm3    Basophils Absolute 0.10 0.00 - 0.20 10*3/mm3    Immature Granulocyte Rel % 0.5 0.0 - 0.5 %    Immature Grans Absolute 0.04 0.00 - 0.05 10*3/mm3    nRBC 0.0 0.0 - 0.2 /100 WBC           Ruth was seen today for adhd.    Diagnoses and all orders for this visit:    Attention deficit hyperactivity disorder (ADHD), combined type  -     amphetamine-dextroamphetamine (ADDERALL) 20 MG tablet; Take 1 tablet by mouth 2 (Two) Times a Day.    Chronic nonintractable headache, unspecified headache type  -     topiramate (TOPAMAX) 25 MG tablet; Take 1 tablet by mouth 2 (Two) Times a Day.    Anxiety    cont current meds.  Ov in 3 mo.     Recent labs normal.     Outpatient Medications Prior to Visit   Medication Sig Dispense Refill   • FLUoxetine (PROzac) 40 MG capsule Take 1 capsule by mouth Daily. 90 capsule 1   • amphetamine-dextroamphetamine (ADDERALL) 20 MG tablet Take 1 tablet by mouth 2 (Two)  Times a Day. 60 tablet 0   • topiramate (TOPAMAX) 25 MG tablet Take 1 tablet by mouth 2 (Two) Times a Day. 180 tablet 1     No facility-administered medications prior to visit.      New Medications Ordered This Visit   Medications   • amphetamine-dextroamphetamine (ADDERALL) 20 MG tablet     Sig: Take 1 tablet by mouth 2 (Two) Times a Day.     Dispense:  60 tablet     Refill:  0   • topiramate (TOPAMAX) 25 MG tablet     Sig: Take 1 tablet by mouth 2 (Two) Times a Day.     Dispense:  180 tablet     Refill:  1     [unfilled]  Medications Discontinued During This Encounter   Medication Reason   • amphetamine-dextroamphetamine (ADDERALL) 20 MG tablet Reorder   • topiramate (TOPAMAX) 25 MG tablet Reorder         Return in about 3 months (around 10/2/2019) for Recheck.

## 2019-07-16 ENCOUNTER — ANESTHESIA EVENT (OUTPATIENT)
Dept: PERIOP | Facility: HOSPITAL | Age: 44
End: 2019-07-16

## 2019-07-16 ENCOUNTER — APPOINTMENT (OUTPATIENT)
Dept: CT IMAGING | Facility: HOSPITAL | Age: 44
End: 2019-07-16

## 2019-07-16 ENCOUNTER — PREP FOR SURGERY (OUTPATIENT)
Dept: OTHER | Facility: HOSPITAL | Age: 44
End: 2019-07-16

## 2019-07-16 ENCOUNTER — HOSPITAL ENCOUNTER (OUTPATIENT)
Facility: HOSPITAL | Age: 44
Discharge: HOME | End: 2019-07-17
Attending: EMERGENCY MEDICINE | Admitting: OBSTETRICS & GYNECOLOGY

## 2019-07-16 ENCOUNTER — ANESTHESIA (OUTPATIENT)
Dept: PERIOP | Facility: HOSPITAL | Age: 44
End: 2019-07-16

## 2019-07-16 DIAGNOSIS — S31.41XA VAGINAL LACERATION, INITIAL ENCOUNTER: Primary | ICD-10-CM

## 2019-07-16 DIAGNOSIS — N93.9 ABNORMAL VAGINAL BLEEDING: ICD-10-CM

## 2019-07-16 DIAGNOSIS — N93.9 ABNORMAL VAGINAL BLEEDING: Primary | ICD-10-CM

## 2019-07-16 LAB
BASOPHILS # BLD AUTO: 0.08 10*3/MM3 (ref 0–0.2)
BASOPHILS NFR BLD AUTO: 0.7 % (ref 0–1.5)
DEPRECATED RDW RBC AUTO: 46 FL (ref 37–54)
EOSINOPHIL # BLD AUTO: 0.27 10*3/MM3 (ref 0–0.4)
EOSINOPHIL NFR BLD AUTO: 2.4 % (ref 0.3–6.2)
ERYTHROCYTE [DISTWIDTH] IN BLOOD BY AUTOMATED COUNT: 13.3 % (ref 12.3–15.4)
HCT VFR BLD AUTO: 40.6 % (ref 34–46.6)
HGB BLD-MCNC: 13.1 G/DL (ref 12–15.9)
IMM GRANULOCYTES # BLD AUTO: 0.03 10*3/MM3 (ref 0–0.05)
IMM GRANULOCYTES NFR BLD AUTO: 0.3 % (ref 0–0.5)
LYMPHOCYTES # BLD AUTO: 2.22 10*3/MM3 (ref 0.7–3.1)
LYMPHOCYTES NFR BLD AUTO: 19.5 % (ref 19.6–45.3)
MCH RBC QN AUTO: 30.5 PG (ref 26.6–33)
MCHC RBC AUTO-ENTMCNC: 32.3 G/DL (ref 31.5–35.7)
MCV RBC AUTO: 94.6 FL (ref 79–97)
MONOCYTES # BLD AUTO: 0.39 10*3/MM3 (ref 0.1–0.9)
MONOCYTES NFR BLD AUTO: 3.4 % (ref 5–12)
NEUTROPHILS # BLD AUTO: 8.39 10*3/MM3 (ref 1.7–7)
NEUTROPHILS NFR BLD AUTO: 73.7 % (ref 42.7–76)
NRBC BLD AUTO-RTO: 0 /100 WBC (ref 0–0.2)
PLATELET # BLD AUTO: 288 10*3/MM3 (ref 140–450)
PMV BLD AUTO: 10.7 FL (ref 6–12)
RBC # BLD AUTO: 4.29 10*6/MM3 (ref 3.77–5.28)
WBC NRBC COR # BLD: 11.38 10*3/MM3 (ref 3.4–10.8)

## 2019-07-16 PROCEDURE — 25010000002 HYDROMORPHONE 1 MG/ML SOLUTION: Performed by: NURSE ANESTHETIST, CERTIFIED REGISTERED

## 2019-07-16 PROCEDURE — G0378 HOSPITAL OBSERVATION PER HR: HCPCS

## 2019-07-16 PROCEDURE — 25010000003 CEFAZOLIN SODIUM-DEXTROSE 2-3 GM-%(50ML) RECONSTITUTED SOLUTION: Performed by: PHYSICIAN ASSISTANT

## 2019-07-16 PROCEDURE — 25010000002 ONDANSETRON PER 1 MG: Performed by: NURSE ANESTHETIST, CERTIFIED REGISTERED

## 2019-07-16 PROCEDURE — 25010000002 SUCCINYLCHOLINE PER 20 MG: Performed by: NURSE ANESTHETIST, CERTIFIED REGISTERED

## 2019-07-16 PROCEDURE — 25010000002 PROPOFOL 10 MG/ML EMULSION: Performed by: NURSE ANESTHETIST, CERTIFIED REGISTERED

## 2019-07-16 PROCEDURE — 99284 EMERGENCY DEPT VISIT MOD MDM: CPT | Performed by: PHYSICIAN ASSISTANT

## 2019-07-16 PROCEDURE — 85025 COMPLETE CBC W/AUTO DIFF WBC: CPT | Performed by: PHYSICIAN ASSISTANT

## 2019-07-16 PROCEDURE — 57200 REPAIR OF VAGINA: CPT | Performed by: OBSTETRICS & GYNECOLOGY

## 2019-07-16 PROCEDURE — 99284 EMERGENCY DEPT VISIT MOD MDM: CPT

## 2019-07-16 PROCEDURE — 25010000002 DEXAMETHASONE PER 1 MG: Performed by: NURSE ANESTHETIST, CERTIFIED REGISTERED

## 2019-07-16 PROCEDURE — 49320 DIAG LAPARO SEPARATE PROC: CPT | Performed by: OBSTETRICS & GYNECOLOGY

## 2019-07-16 PROCEDURE — S0260 H&P FOR SURGERY: HCPCS | Performed by: OBSTETRICS & GYNECOLOGY

## 2019-07-16 PROCEDURE — 94799 UNLISTED PULMONARY SVC/PX: CPT

## 2019-07-16 PROCEDURE — 25010000002 DIPHENHYDRAMINE PER 50 MG: Performed by: NURSE ANESTHETIST, CERTIFIED REGISTERED

## 2019-07-16 PROCEDURE — 74176 CT ABD & PELVIS W/O CONTRAST: CPT

## 2019-07-16 PROCEDURE — 25010000002 MIDAZOLAM PER 1 MG: Performed by: NURSE ANESTHETIST, CERTIFIED REGISTERED

## 2019-07-16 RX ORDER — ONDANSETRON 2 MG/ML
4 INJECTION INTRAMUSCULAR; INTRAVENOUS EVERY 6 HOURS PRN
Status: DISCONTINUED | OUTPATIENT
Start: 2019-07-16 | End: 2019-07-17 | Stop reason: HOSPADM

## 2019-07-16 RX ORDER — TOPIRAMATE 25 MG/1
25 TABLET ORAL 2 TIMES DAILY
Status: DISCONTINUED | OUTPATIENT
Start: 2019-07-17 | End: 2019-07-17 | Stop reason: HOSPADM

## 2019-07-16 RX ORDER — MEPERIDINE HYDROCHLORIDE 25 MG/ML
12.5 INJECTION INTRAMUSCULAR; INTRAVENOUS; SUBCUTANEOUS
Status: DISCONTINUED | OUTPATIENT
Start: 2019-07-16 | End: 2019-07-16 | Stop reason: HOSPADM

## 2019-07-16 RX ORDER — PROMETHAZINE HYDROCHLORIDE 25 MG/ML
12.5 INJECTION, SOLUTION INTRAMUSCULAR; INTRAVENOUS EVERY 6 HOURS PRN
Status: DISCONTINUED | OUTPATIENT
Start: 2019-07-16 | End: 2019-07-17 | Stop reason: HOSPADM

## 2019-07-16 RX ORDER — PROPOFOL 10 MG/ML
VIAL (ML) INTRAVENOUS AS NEEDED
Status: DISCONTINUED | OUTPATIENT
Start: 2019-07-16 | End: 2019-07-16 | Stop reason: SURG

## 2019-07-16 RX ORDER — CEFAZOLIN SODIUM 2 G/50ML
2 SOLUTION INTRAVENOUS
Status: CANCELLED | OUTPATIENT
Start: 2019-07-16

## 2019-07-16 RX ORDER — ONDANSETRON 2 MG/ML
4 INJECTION INTRAMUSCULAR; INTRAVENOUS ONCE AS NEEDED
Status: COMPLETED | OUTPATIENT
Start: 2019-07-16 | End: 2019-07-16

## 2019-07-16 RX ORDER — ACETAMINOPHEN 325 MG/1
650 TABLET ORAL ONCE AS NEEDED
Status: DISCONTINUED | OUTPATIENT
Start: 2019-07-16 | End: 2019-07-16 | Stop reason: HOSPADM

## 2019-07-16 RX ORDER — SODIUM CHLORIDE 9 MG/ML
40 INJECTION, SOLUTION INTRAVENOUS AS NEEDED
Status: DISCONTINUED | OUTPATIENT
Start: 2019-07-16 | End: 2019-07-16 | Stop reason: HOSPADM

## 2019-07-16 RX ORDER — DOCUSATE SODIUM 100 MG/1
100 CAPSULE, LIQUID FILLED ORAL 2 TIMES DAILY PRN
Status: DISCONTINUED | OUTPATIENT
Start: 2019-07-16 | End: 2019-07-17 | Stop reason: HOSPADM

## 2019-07-16 RX ORDER — OXYCODONE HYDROCHLORIDE AND ACETAMINOPHEN 5; 325 MG/1; MG/1
1 TABLET ORAL EVERY 4 HOURS PRN
Status: DISCONTINUED | OUTPATIENT
Start: 2019-07-16 | End: 2019-07-17 | Stop reason: HOSPADM

## 2019-07-16 RX ORDER — ONDANSETRON 2 MG/ML
4 INJECTION INTRAMUSCULAR; INTRAVENOUS ONCE AS NEEDED
Status: DISCONTINUED | OUTPATIENT
Start: 2019-07-16 | End: 2019-07-16 | Stop reason: HOSPADM

## 2019-07-16 RX ORDER — DIPHENHYDRAMINE HYDROCHLORIDE 50 MG/ML
12.5 INJECTION INTRAMUSCULAR; INTRAVENOUS ONCE
Status: COMPLETED | OUTPATIENT
Start: 2019-07-16 | End: 2019-07-16

## 2019-07-16 RX ORDER — OXYCODONE HYDROCHLORIDE AND ACETAMINOPHEN 5; 325 MG/1; MG/1
1 TABLET ORAL ONCE AS NEEDED
Status: DISCONTINUED | OUTPATIENT
Start: 2019-07-16 | End: 2019-07-16 | Stop reason: HOSPADM

## 2019-07-16 RX ORDER — DIPHENHYDRAMINE HCL 25 MG
25 CAPSULE ORAL NIGHTLY PRN
Status: DISCONTINUED | OUTPATIENT
Start: 2019-07-16 | End: 2019-07-17 | Stop reason: HOSPADM

## 2019-07-16 RX ORDER — MAGNESIUM HYDROXIDE 1200 MG/15ML
LIQUID ORAL AS NEEDED
Status: DISCONTINUED | OUTPATIENT
Start: 2019-07-16 | End: 2019-07-16 | Stop reason: HOSPADM

## 2019-07-16 RX ORDER — SODIUM CHLORIDE 0.9 % (FLUSH) 0.9 %
3 SYRINGE (ML) INJECTION EVERY 12 HOURS SCHEDULED
Status: DISCONTINUED | OUTPATIENT
Start: 2019-07-16 | End: 2019-07-16 | Stop reason: HOSPADM

## 2019-07-16 RX ORDER — SUCCINYLCHOLINE CHLORIDE 20 MG/ML
INJECTION INTRAMUSCULAR; INTRAVENOUS AS NEEDED
Status: DISCONTINUED | OUTPATIENT
Start: 2019-07-16 | End: 2019-07-16 | Stop reason: SURG

## 2019-07-16 RX ORDER — ACETAMINOPHEN 650 MG/1
650 SUPPOSITORY RECTAL ONCE AS NEEDED
Status: DISCONTINUED | OUTPATIENT
Start: 2019-07-16 | End: 2019-07-16 | Stop reason: HOSPADM

## 2019-07-16 RX ORDER — PROMETHAZINE HYDROCHLORIDE 25 MG/1
12.5 TABLET ORAL EVERY 6 HOURS PRN
Status: DISCONTINUED | OUTPATIENT
Start: 2019-07-16 | End: 2019-07-17 | Stop reason: HOSPADM

## 2019-07-16 RX ORDER — SODIUM CHLORIDE, SODIUM LACTATE, POTASSIUM CHLORIDE, CALCIUM CHLORIDE 600; 310; 30; 20 MG/100ML; MG/100ML; MG/100ML; MG/100ML
150 INJECTION, SOLUTION INTRAVENOUS CONTINUOUS
Status: DISCONTINUED | OUTPATIENT
Start: 2019-07-17 | End: 2019-07-17 | Stop reason: HOSPADM

## 2019-07-16 RX ORDER — SODIUM CHLORIDE, SODIUM LACTATE, POTASSIUM CHLORIDE, CALCIUM CHLORIDE 600; 310; 30; 20 MG/100ML; MG/100ML; MG/100ML; MG/100ML
9 INJECTION, SOLUTION INTRAVENOUS CONTINUOUS PRN
Status: DISCONTINUED | OUTPATIENT
Start: 2019-07-16 | End: 2019-07-17 | Stop reason: HOSPADM

## 2019-07-16 RX ORDER — KETAMINE HYDROCHLORIDE 100 MG/ML
INJECTION INTRAMUSCULAR; INTRAVENOUS AS NEEDED
Status: DISCONTINUED | OUTPATIENT
Start: 2019-07-16 | End: 2019-07-16 | Stop reason: SURG

## 2019-07-16 RX ORDER — SODIUM CHLORIDE 0.9 % (FLUSH) 0.9 %
10 SYRINGE (ML) INJECTION AS NEEDED
Status: DISCONTINUED | OUTPATIENT
Start: 2019-07-16 | End: 2019-07-17 | Stop reason: HOSPADM

## 2019-07-16 RX ORDER — SODIUM CHLORIDE 9 MG/ML
40 INJECTION, SOLUTION INTRAVENOUS AS NEEDED
Status: CANCELLED | OUTPATIENT
Start: 2019-07-16

## 2019-07-16 RX ORDER — IBUPROFEN 600 MG/1
600 TABLET ORAL EVERY 6 HOURS PRN
Status: DISCONTINUED | OUTPATIENT
Start: 2019-07-16 | End: 2019-07-17 | Stop reason: HOSPADM

## 2019-07-16 RX ORDER — MIDAZOLAM HYDROCHLORIDE 1 MG/ML
1 INJECTION INTRAMUSCULAR; INTRAVENOUS
Status: DISCONTINUED | OUTPATIENT
Start: 2019-07-16 | End: 2019-07-16 | Stop reason: HOSPADM

## 2019-07-16 RX ORDER — OXYCODONE AND ACETAMINOPHEN 10; 325 MG/1; MG/1
1 TABLET ORAL EVERY 4 HOURS PRN
Status: DISCONTINUED | OUTPATIENT
Start: 2019-07-16 | End: 2019-07-17 | Stop reason: HOSPADM

## 2019-07-16 RX ORDER — DIPHENHYDRAMINE HYDROCHLORIDE 50 MG/ML
25 INJECTION INTRAMUSCULAR; INTRAVENOUS NIGHTLY PRN
Status: DISCONTINUED | OUTPATIENT
Start: 2019-07-16 | End: 2019-07-17 | Stop reason: HOSPADM

## 2019-07-16 RX ORDER — CEFAZOLIN SODIUM 2 G/50ML
2 SOLUTION INTRAVENOUS ONCE
Status: COMPLETED | OUTPATIENT
Start: 2019-07-16 | End: 2019-07-16

## 2019-07-16 RX ORDER — SODIUM CHLORIDE 0.9 % (FLUSH) 0.9 %
3 SYRINGE (ML) INJECTION EVERY 12 HOURS SCHEDULED
Status: CANCELLED | OUTPATIENT
Start: 2019-07-16

## 2019-07-16 RX ORDER — FLUOXETINE HYDROCHLORIDE 20 MG/1
40 CAPSULE ORAL DAILY
Status: DISCONTINUED | OUTPATIENT
Start: 2019-07-17 | End: 2019-07-17 | Stop reason: HOSPADM

## 2019-07-16 RX ORDER — DIPHENHYDRAMINE HYDROCHLORIDE 50 MG/ML
12.5 INJECTION INTRAMUSCULAR; INTRAVENOUS
Status: DISCONTINUED | OUTPATIENT
Start: 2019-07-16 | End: 2019-07-16 | Stop reason: HOSPADM

## 2019-07-16 RX ORDER — LIDOCAINE HYDROCHLORIDE 20 MG/ML
INJECTION, SOLUTION INFILTRATION; PERINEURAL AS NEEDED
Status: DISCONTINUED | OUTPATIENT
Start: 2019-07-16 | End: 2019-07-16 | Stop reason: SURG

## 2019-07-16 RX ORDER — CEFAZOLIN SODIUM 2 G/50ML
2 SOLUTION INTRAVENOUS
Status: DISCONTINUED | OUTPATIENT
Start: 2019-07-16 | End: 2019-07-16 | Stop reason: SDUPTHER

## 2019-07-16 RX ORDER — DEXAMETHASONE SODIUM PHOSPHATE 4 MG/ML
8 INJECTION, SOLUTION INTRA-ARTICULAR; INTRALESIONAL; INTRAMUSCULAR; INTRAVENOUS; SOFT TISSUE ONCE AS NEEDED
Status: COMPLETED | OUTPATIENT
Start: 2019-07-16 | End: 2019-07-16

## 2019-07-16 RX ORDER — SIMETHICONE 80 MG
80 TABLET,CHEWABLE ORAL 4 TIMES DAILY PRN
Status: DISCONTINUED | OUTPATIENT
Start: 2019-07-16 | End: 2019-07-17 | Stop reason: HOSPADM

## 2019-07-16 RX ORDER — ONDANSETRON 4 MG/1
4 TABLET, FILM COATED ORAL EVERY 6 HOURS PRN
Status: DISCONTINUED | OUTPATIENT
Start: 2019-07-16 | End: 2019-07-17 | Stop reason: HOSPADM

## 2019-07-16 RX ORDER — EPHEDRINE SULFATE 50 MG/ML
INJECTION, SOLUTION INTRAVENOUS AS NEEDED
Status: DISCONTINUED | OUTPATIENT
Start: 2019-07-16 | End: 2019-07-16 | Stop reason: SURG

## 2019-07-16 RX ORDER — MIDAZOLAM HYDROCHLORIDE 1 MG/ML
2 INJECTION INTRAMUSCULAR; INTRAVENOUS
Status: DISCONTINUED | OUTPATIENT
Start: 2019-07-16 | End: 2019-07-16 | Stop reason: HOSPADM

## 2019-07-16 RX ORDER — SODIUM CHLORIDE 0.9 % (FLUSH) 0.9 %
1-10 SYRINGE (ML) INJECTION AS NEEDED
Status: DISCONTINUED | OUTPATIENT
Start: 2019-07-16 | End: 2019-07-16 | Stop reason: HOSPADM

## 2019-07-16 RX ORDER — ROCURONIUM BROMIDE 10 MG/ML
INJECTION, SOLUTION INTRAVENOUS AS NEEDED
Status: DISCONTINUED | OUTPATIENT
Start: 2019-07-16 | End: 2019-07-16 | Stop reason: SURG

## 2019-07-16 RX ORDER — LIDOCAINE HYDROCHLORIDE 10 MG/ML
0.5 INJECTION, SOLUTION EPIDURAL; INFILTRATION; INTRACAUDAL; PERINEURAL ONCE AS NEEDED
Status: DISCONTINUED | OUTPATIENT
Start: 2019-07-16 | End: 2019-07-16 | Stop reason: HOSPADM

## 2019-07-16 RX ORDER — GLYCOPYRROLATE 0.2 MG/ML
INJECTION INTRAMUSCULAR; INTRAVENOUS AS NEEDED
Status: DISCONTINUED | OUTPATIENT
Start: 2019-07-16 | End: 2019-07-16 | Stop reason: SURG

## 2019-07-16 RX ORDER — SODIUM CHLORIDE 0.9 % (FLUSH) 0.9 %
1-10 SYRINGE (ML) INJECTION AS NEEDED
Status: CANCELLED | OUTPATIENT
Start: 2019-07-16

## 2019-07-16 RX ORDER — PROMETHAZINE HYDROCHLORIDE 25 MG/1
12.5 SUPPOSITORY RECTAL EVERY 6 HOURS PRN
Status: DISCONTINUED | OUTPATIENT
Start: 2019-07-16 | End: 2019-07-17 | Stop reason: HOSPADM

## 2019-07-16 RX ADMIN — DIPHENHYDRAMINE HYDROCHLORIDE 12.5 MG: 50 INJECTION, SOLUTION INTRAMUSCULAR; INTRAVENOUS at 21:03

## 2019-07-16 RX ADMIN — MIDAZOLAM HYDROCHLORIDE 2 MG: 1 INJECTION, SOLUTION INTRAMUSCULAR; INTRAVENOUS at 21:02

## 2019-07-16 RX ADMIN — CEFAZOLIN SODIUM 2 G: 2 SOLUTION INTRAVENOUS at 21:12

## 2019-07-16 RX ADMIN — ONDANSETRON 4 MG: 2 INJECTION, SOLUTION INTRAMUSCULAR; INTRAVENOUS at 21:02

## 2019-07-16 RX ADMIN — IBUPROFEN 600 MG: 600 TABLET, FILM COATED ORAL at 23:47

## 2019-07-16 RX ADMIN — GLYCOPYRROLATE 0.2 MG: 0.2 INJECTION INTRAMUSCULAR; INTRAVENOUS at 21:32

## 2019-07-16 RX ADMIN — EPHEDRINE SULFATE 10 MG: 50 INJECTION, SOLUTION INTRAVENOUS at 21:36

## 2019-07-16 RX ADMIN — HYDROMORPHONE HYDROCHLORIDE 1 MG: 1 INJECTION, SOLUTION INTRAMUSCULAR; INTRAVENOUS; SUBCUTANEOUS at 22:16

## 2019-07-16 RX ADMIN — SODIUM CHLORIDE, POTASSIUM CHLORIDE, SODIUM LACTATE AND CALCIUM CHLORIDE: 600; 310; 30; 20 INJECTION, SOLUTION INTRAVENOUS at 21:09

## 2019-07-16 RX ADMIN — FAMOTIDINE 20 MG: 10 INJECTION, SOLUTION INTRAVENOUS at 21:02

## 2019-07-16 RX ADMIN — DEXAMETHASONE SODIUM PHOSPHATE 8 MG: 4 INJECTION, SOLUTION INTRAMUSCULAR; INTRAVENOUS at 21:02

## 2019-07-16 RX ADMIN — LIDOCAINE HYDROCHLORIDE 80 MG: 20 INJECTION, SOLUTION INFILTRATION; PERINEURAL at 21:10

## 2019-07-16 RX ADMIN — PROPOFOL 150 MG: 10 INJECTION, EMULSION INTRAVENOUS at 21:15

## 2019-07-16 RX ADMIN — DOCUSATE SODIUM 100 MG: 100 CAPSULE, LIQUID FILLED ORAL at 23:48

## 2019-07-16 RX ADMIN — SODIUM CHLORIDE, POTASSIUM CHLORIDE, SODIUM LACTATE AND CALCIUM CHLORIDE 9 ML/HR: 600; 310; 30; 20 INJECTION, SOLUTION INTRAVENOUS at 21:02

## 2019-07-16 RX ADMIN — OXYCODONE HYDROCHLORIDE AND ACETAMINOPHEN 1 TABLET: 5; 325 TABLET ORAL at 23:47

## 2019-07-16 RX ADMIN — EPHEDRINE SULFATE 10 MG: 50 INJECTION, SOLUTION INTRAVENOUS at 21:42

## 2019-07-16 RX ADMIN — HYDROMORPHONE HYDROCHLORIDE 0.5 MG: 1 INJECTION, SOLUTION INTRAMUSCULAR; INTRAVENOUS; SUBCUTANEOUS at 22:26

## 2019-07-16 RX ADMIN — KETAMINE HYDROCHLORIDE 10 MG: 100 INJECTION INTRAMUSCULAR; INTRAVENOUS at 21:13

## 2019-07-16 RX ADMIN — PROPOFOL 50 MG: 10 INJECTION, EMULSION INTRAVENOUS at 21:28

## 2019-07-16 RX ADMIN — SUCCINYLCHOLINE CHLORIDE 80 MG: 20 INJECTION, SOLUTION INTRAMUSCULAR; INTRAVENOUS at 21:28

## 2019-07-16 RX ADMIN — ROCURONIUM BROMIDE 2 MG: 10 INJECTION INTRAVENOUS at 21:25

## 2019-07-16 NOTE — ED PROVIDER NOTES
EMERGENCY DEPARTMENT ENCOUNTER      Room Number: [facility] OR/OR    History is provided by the patient, no translation services needed    HPI:    Chief complaint: Vaginal bleeding    Location: Denies any pain    Quality/Severity:  0/10    Timing/Duration: Began 3 hours prior to arrival, worsening    Modifying Factors:  Bleeding began during intercourse.    Associated Symptoms: Positive for vaginal bleeding.  Denies any vaginal discharge other than blood, denies any pain, dizziness, weakness, fatigue, nausea, vomiting.  Denies any dysuria, hematuria, or urgency to urinate.    Narrative: Pt is a 43 y.o. female who presents complaining of vaginal bleeding for 3 hours prior to arrival.  Patient states she had a hysterectomy 3 years ago, this is the first time she has had intercourse since her hysterectomy, she began having vaginal bleeding 5 minutes into intercourse.  She denies any pain at this time.  She states she has been passing large blood clots, and has soaked through several pads and pairs of yoga pants.      PMD: Jerri Wellington MD    REVIEW OF SYSTEMS  Review of Systems   Constitutional: Negative for fatigue and fever.   Respiratory: Negative for cough and shortness of breath.    Cardiovascular: Negative for chest pain and palpitations.   Gastrointestinal: Negative for abdominal pain, nausea and vomiting.   Genitourinary: Positive for vaginal bleeding. Negative for difficulty urinating, dysuria, frequency, pelvic pain and vaginal pain.   Musculoskeletal: Negative for back pain and neck pain.   Skin: Negative for pallor and rash.   Neurological: Negative for dizziness, syncope and weakness.   Hematological: Does not bruise/bleed easily.   Psychiatric/Behavioral: Negative for confusion. The patient is not nervous/anxious.          PAST MEDICAL HISTORY  Active Ambulatory Problems     Diagnosis Date Noted   • Anxiety 09/27/2016   • Attention deficit hyperactivity disorder (ADHD), combined type 09/27/2016   •  Smoker 2018   • Diarrhea 2018   • Rectal bleeding 2018     Resolved Ambulatory Problems     Diagnosis Date Noted   • No Resolved Ambulatory Problems     Past Medical History:   Diagnosis Date   • ADD (attention deficit disorder)    • Anxiety    • Depression        PAST SURGICAL HISTORY  Past Surgical History:   Procedure Laterality Date   •  SECTION      x 2   • HYSTERECTOMY  2015    TLH/BS with OC   • TUBAL ABDOMINAL LIGATION         FAMILY HISTORY  Family History   Problem Relation Age of Onset   • Immunodeficiency Mother    • Emphysema Father    • Breast cancer Neg Hx    • Ovarian cancer Neg Hx    • Colon cancer Neg Hx    • Deep vein thrombosis Neg Hx    • Pulmonary embolism Neg Hx        SOCIAL HISTORY  Social History     Socioeconomic History   • Marital status: Single     Spouse name: Not on file   • Number of children: Not on file   • Years of education: Not on file   • Highest education level: Not on file   Tobacco Use   • Smoking status: Light Tobacco Smoker     Types: Cigarettes   • Smokeless tobacco: Never Used   Substance and Sexual Activity   • Alcohol use: Yes     Alcohol/week: 1.2 oz     Types: 1 Glasses of wine, 1 Cans of beer per week     Comment: SOCIALLY 2-3 drinks per wk   • Drug use: No   • Sexual activity: Yes     Partners: Male     Birth control/protection: Surgical     Comment: TLH       ALLERGIES  Patient has no known allergies.      Current Facility-Administered Medications:   •  [MAR Hold] ceFAZolin Sodium-Dextrose (ANCEF) IVPB (duplex) 2 g, 2 g, Intravenous, Once, Jerri Hubbard PA-C  •  dexamethasone (DECADRON) injection 8 mg, 8 mg, Intravenous, Once PRN, Brody Schmidt CRNA  •  diphenhydrAMINE (BENADRYL) injection 12.5 mg, 12.5 mg, Intravenous, Once, Brody Schmidt CRNA  •  famotidine (PEPCID) injection 20 mg, 20 mg, Intravenous, 60 Min Pre-Op, Brody Schmidt CRNA  •  lactated ringers infusion, 9 mL/hr, Intravenous, Continuous PRN,  Roxana, Brody D, CRNA  •  lidocaine PF 1% (XYLOCAINE) injection 0.5 mL, 0.5 mL, Injection, Once PRN, Brody Schmidt CRNA  •  midazolam (VERSED) injection 1 mg, 1 mg, Intravenous, Q5 Min PRN **OR** midazolam (VERSED) injection 2 mg, 2 mg, Intravenous, Q5 Min PRN, Brody Schmidt CRNA  •  ondansetron (ZOFRAN) injection 4 mg, 4 mg, Intravenous, Once PRN, Brody Schmidt CRNA  •  sodium chloride 0.9 % flush 1-10 mL, 1-10 mL, Intravenous, PRN, Brody Schmidt CRNA  •  [COMPLETED] Insert peripheral IV, , , Once **AND** [MAR Hold] sodium chloride 0.9 % flush 10 mL, 10 mL, Intravenous, PRN, Jerri Hubbard PA-C  •  sodium chloride 0.9 % flush 3 mL, 3 mL, Intravenous, Q12H, Brody Schmidt CRNA  •  sodium chloride 0.9 % infusion 40 mL, 40 mL, Intravenous, PRN, Brody Schmidt CRNA    PHYSICAL EXAM  ED Triage Vitals [07/16/19 1800]   Temp Heart Rate Resp BP SpO2   98.6 °F (37 °C) 79 15 120/76 99 %      Temp src Heart Rate Source Patient Position BP Location FiO2 (%)   Oral Monitor Sitting Right arm --       Physical Exam   Constitutional: She is oriented to person, place, and time and well-developed, well-nourished, and in no distress.   HENT:   Head: Normocephalic and atraumatic.   Cardiovascular: Normal rate, regular rhythm, normal heart sounds and intact distal pulses.   Pulmonary/Chest: Effort normal and breath sounds normal.   Abdominal: Soft. Bowel sounds are normal. There is no tenderness.   Genitourinary: Bloody and vaginal discharge found.   Genitourinary Comments: Numerous blood clots removed, there appears to be a laceration in the proximal vagina that is actively bleeding, poor visualization due to bleeding.  [employer]AM[employer] Cort[employer] student, at bedside during pelvic exam.   Musculoskeletal: Normal range of motion. She exhibits no edema.   Neurological: She is alert and oriented to person, place, and time.   Skin: Skin is warm and dry.   Psychiatric: Mood, memory, affect and judgment  normal.             LAB RESULTS  Results for orders placed or performed during the hospital encounter of 07/16/19   CBC Auto Differential   Result Value Ref Range    WBC 11.38 (H) 3.40 - 10.80 10*3/mm3    RBC 4.29 3.77 - 5.28 10*6/mm3    Hemoglobin 13.1 12.0 - 15.9 g/dL    Hematocrit 40.6 34.0 - 46.6 %    MCV 94.6 79.0 - 97.0 fL    MCH 30.5 26.6 - 33.0 pg    MCHC 32.3 31.5 - 35.7 g/dL    RDW 13.3 12.3 - 15.4 %    RDW-SD 46.0 37.0 - 54.0 fl    MPV 10.7 6.0 - 12.0 fL    Platelets 288 140 - 450 10*3/mm3    Neutrophil % 73.7 42.7 - 76.0 %    Lymphocyte % 19.5 (L) 19.6 - 45.3 %    Monocyte % 3.4 (L) 5.0 - 12.0 %    Eosinophil % 2.4 0.3 - 6.2 %    Basophil % 0.7 0.0 - 1.5 %    Immature Grans % 0.3 0.0 - 0.5 %    Neutrophils, Absolute 8.39 (H) 1.70 - 7.00 10*3/mm3    Lymphocytes, Absolute 2.22 0.70 - 3.10 10*3/mm3    Monocytes, Absolute 0.39 0.10 - 0.90 10*3/mm3    Eosinophils, Absolute 0.27 0.00 - 0.40 10*3/mm3    Basophils, Absolute 0.08 0.00 - 0.20 10*3/mm3    Immature Grans, Absolute 0.03 0.00 - 0.05 10*3/mm3    nRBC 0.0 0.0 - 0.2 /100 WBC         I ordered the above labs and reviewed the results    RADIOLOGY  Ct Abdomen Pelvis Without Contrast    Result Date: 7/16/2019  Narrative: CT Abdomen Pelvis WO INDICATION: Menometrorrhagia began today TECHNIQUE: CT of the abdomen and pelvis without contrast. Coronal and sagittal reconstructions were obtained.  Radiation dose reduction techniques included automated exposure control or exposure modulation based on body size. Radiation audit for number of CT and nuclear cardiology exams performed in the last year: 0.  COMPARISON: None available. FINDINGS: Visualized lung bases are unremarkable. Abdomen: The liver and gallbladder, spleen and pancreas and adrenal glands and kidneys are normal. The aorta is normal in caliber. There is no bowel obstruction. Pelvis:  The appendix is normal. There is no pelvic mass or inflammatory change or abnormal fluid collection. There is no hernia  or bowel obstruction. Status post hysterectomy.     Impression: Negative CT of the abdomen and pelvis. Normal appendix. No renal or bowel or biliary obstruction. No acute abnormality. Status post hysterectomy, no pelvic mass or other acute pelvic findings. Signer Name: Hai Chen MD  Signed: 7/16/2019 7:42 PM  Workstation Name: [email or web address]       I ordered the above radiologic testing and reviewed the results    PROCEDURES  Procedures      PROGRESS AND CONSULTS  ED Course as of Jul 16 2049 Tue Jul 16, 2019 1838 CONSULT  Time 1838  Discussed case with Dr Brenda Almazan, OB/GYN.  Reviewed history, exam, results and treatments.  Discussed concerns and plan of care. Dr Almazan states she will likely gather in our team and she will come see the patient in the ED this evening.  She requests that we obtain as CT abdomen pelvis without contrast to evaluate for any free air or vaginal rupture.    [KS]   1950 Discussed negative CT results and CBC results with Dr. Almazan.  She advises to give patient 2 g of Ancef, and call the OR team in.  She states she will be in shortly to evaluate patient.  [KS]   1955 Discussed plan with patient that she will be taken to the OR for further evaluation and treatment of her vaginal laceration.  She is quite tearful.  She does not have any family or friends with her at this time.  Patient consoled and reassured.  I discussed negative CT results with her.  She is agreeable with the plan to go under general anesthesia for further evaluation and treatment.  [KS]   2040 Patient taken to the OR.  [KS]      ED Course User Index  [KS] Jerri Hubbard PA-C           MEDICAL DECISION MAKING  Results were reviewed/discussed with the patient and they were also made aware of online access. Pt also made aware that some labs, such as cultures, will not be resulted during ER visit and follow up with PMD is necessary.     MDM  Number of Diagnoses or Management Options  Vaginal  laceration, initial encounter:      Amount and/or Complexity of Data Reviewed  Clinical lab tests: reviewed and ordered  Tests in the radiology section of CPT®: reviewed and ordered  Discuss the patient with other providers: yes (Dr. Brenda Almazan.)  Independent visualization of images, tracings, or specimens: yes    Risk of Complications, Morbidity, and/or Mortality  Presenting problems: moderate  Diagnostic procedures: moderate  Management options: moderate    Patient Progress  Patient progress: stable         DIAGNOSIS  Final diagnoses:   Vaginal laceration, initial encounter       Latest Documented Vital Signs:  As of 8:49 PM  BP- (!) 123/104 HR- 79 Temp- 97.6 °F (36.4 °C) (Temporal) O2 sat- 100%    DISPOSITION  Patient taken to the OR per Dr. Brenda Almazan, OB/GYN.       Medication List      No changes were made to your prescriptions during this visit.                 Dictated utilizing Dragon dictation       Jerri Hubbard PA-C  07/16/19 2050

## 2019-07-17 VITALS
SYSTOLIC BLOOD PRESSURE: 121 MMHG | WEIGHT: 130 LBS | HEART RATE: 75 BPM | RESPIRATION RATE: 18 BRPM | BODY MASS INDEX: 22.2 KG/M2 | TEMPERATURE: 98 F | DIASTOLIC BLOOD PRESSURE: 75 MMHG | HEIGHT: 64 IN | OXYGEN SATURATION: 99 %

## 2019-07-17 LAB
DEPRECATED RDW RBC AUTO: 47.4 FL (ref 37–54)
ERYTHROCYTE [DISTWIDTH] IN BLOOD BY AUTOMATED COUNT: 13 % (ref 12.3–15.4)
HCT VFR BLD AUTO: 39.6 % (ref 34–46.6)
HGB BLD-MCNC: 12.4 G/DL (ref 12–15.9)
MCH RBC QN AUTO: 30.2 PG (ref 26.6–33)
MCHC RBC AUTO-ENTMCNC: 31.3 G/DL (ref 31.5–35.7)
MCV RBC AUTO: 96.4 FL (ref 79–97)
PLATELET # BLD AUTO: 273 10*3/MM3 (ref 140–450)
PMV BLD AUTO: 10.5 FL (ref 6–12)
RBC # BLD AUTO: 4.11 10*6/MM3 (ref 3.77–5.28)
WBC NRBC COR # BLD: 10.81 10*3/MM3 (ref 3.4–10.8)

## 2019-07-17 PROCEDURE — G0378 HOSPITAL OBSERVATION PER HR: HCPCS

## 2019-07-17 PROCEDURE — 99024 POSTOP FOLLOW-UP VISIT: CPT | Performed by: OBSTETRICS & GYNECOLOGY

## 2019-07-17 PROCEDURE — 85027 COMPLETE CBC AUTOMATED: CPT | Performed by: OBSTETRICS & GYNECOLOGY

## 2019-07-17 RX ORDER — OXYCODONE HYDROCHLORIDE AND ACETAMINOPHEN 5; 325 MG/1; MG/1
1 TABLET ORAL EVERY 4 HOURS PRN
Qty: 15 TABLET | Refills: 0 | Status: SHIPPED | OUTPATIENT
Start: 2019-07-17 | End: 2019-07-26

## 2019-07-17 RX ORDER — IBUPROFEN 600 MG/1
600 TABLET ORAL EVERY 6 HOURS PRN
Qty: 30 TABLET | Refills: 0 | Status: SHIPPED | OUTPATIENT
Start: 2019-07-17 | End: 2019-07-20 | Stop reason: SDUPTHER

## 2019-07-17 RX ORDER — PSEUDOEPHEDRINE HCL 30 MG
100 TABLET ORAL 2 TIMES DAILY PRN
Qty: 60 EACH | Refills: 0 | Status: SHIPPED | OUTPATIENT
Start: 2019-07-17 | End: 2019-11-21

## 2019-07-17 RX ADMIN — SODIUM CHLORIDE, POTASSIUM CHLORIDE, SODIUM LACTATE AND CALCIUM CHLORIDE 150 ML/HR: 600; 310; 30; 20 INJECTION, SOLUTION INTRAVENOUS at 02:43

## 2019-07-17 RX ADMIN — OXYCODONE HYDROCHLORIDE AND ACETAMINOPHEN 1 TABLET: 5; 325 TABLET ORAL at 04:40

## 2019-07-17 RX ADMIN — TOPIRAMATE 25 MG: 25 TABLET, COATED ORAL at 00:59

## 2019-07-17 NOTE — PLAN OF CARE
Problem: Patient Care Overview  Goal: Plan of Care Review  Outcome: Ongoing (interventions implemented as appropriate)   07/17/19 0901   Coping/Psychosocial   Plan of Care Reviewed With patient   Plan of Care Review   Progress improving   OTHER   Outcome Summary VSS, denies pain, d/c home       Problem: Surgery Nonspecified (Adult)  Goal: Signs and Symptoms of Listed Potential Problems Will be Absent, Minimized or Managed (Surgery Nonspecified)  Outcome: Ongoing (interventions implemented as appropriate)   07/17/19 0901   Goal/Outcome Evaluation   Problems Assessed (Surgery) all   Problems Present (Surgery) none

## 2019-07-17 NOTE — ANESTHESIA POSTPROCEDURE EVALUATION
Patient: Ruth Casillas    Procedure Summary     Date:  07/16/19 Room / Location:   [facility] OR 2 /  [facility] OR    Anesthesia Start:  2110 Anesthesia Stop:  2158    Procedures:       EXAM UNDER ANESTHESIA (N/A )      DIAGNOSTIC LAPAROSCOPY (N/A Abdomen) Diagnosis:  (VAGINAL BLEEDING)    Surgeon:  Brenda Almazan MD Provider:  Brody Schmidt CRNA    Anesthesia Type:  general ASA Status:  2 - Emergent          Anesthesia Type: general  Last vitals  BP   114/76 (07/16/19 2220)   Temp   96.3 °F (35.7 °C) (07/16/19 2155)   Pulse   80 (07/16/19 2220)   Resp   (P) 14 (07/16/19 2220)     SpO2   99 % (07/16/19 2220)     Post Anesthesia Care and Evaluation    Patient location during evaluation: bedside  Patient participation: complete - patient participated  Level of consciousness: awake and alert  Pain score: 0  Pain management: adequate  Airway patency: patent  Anesthetic complications: No anesthetic complications    Cardiovascular status: acceptable  Respiratory status: acceptable  Hydration status: acceptable

## 2019-07-17 NOTE — H&P
"    Patient Care Team:  Jerri Wellington MD as PCP - General  Jerri Wellington MD as PCP - Family Medicine    Chief complaint : vaginal bleeding after hysterectomy       HPI: 43-year-old patient presented to the emergency room with heavy vaginal bleeding after intercourse.  She had a total laparoscopic hysterectomy 2015.  She has not had any problems since that time.  She does not recall being sexually active after her procedure until today.  She was having sex and noticed some \"wetness\", and then noticed that she was having vaginal bleeding.  She called the office and was instructed to go to the emergency room given how significant her bleeding was after hysterectomy.  She denied any pain, nausea or vomiting.  She denies any fevers or chills.  She is bleeding through her pad and onto the bed and the ER physicians were unable to see a discrete area that was bleeding.  This did appear to be coming from the area of the cuff.  Her CT scan was unremarkable and did not show any free air or fluid collections in the pelvis.  She is hemodynamically stable.    PMH:   Past Medical History:   Diagnosis Date   • ADD (attention deficit disorder)    • Anxiety    • Depression          PSH:   Past Surgical History:   Procedure Laterality Date   •  SECTION      x 2   • HYSTERECTOMY  2015    TLH/BS with OC   • TUBAL ABDOMINAL LIGATION         SoHx:   Social History     Socioeconomic History   • Marital status: Single     Spouse name: Not on file   • Number of children: Not on file   • Years of education: Not on file   • Highest education level: Not on file   Tobacco Use   • Smoking status: Light Tobacco Smoker     Types: Cigarettes   • Smokeless tobacco: Never Used   Substance and Sexual Activity   • Alcohol use: Yes     Alcohol/week: 1.2 oz     Types: 1 Glasses of wine, 1 Cans of beer per week     Comment: SOCIALLY 2-3 drinks per wk   • Drug use: No   • Sexual activity: Yes     Partners: Male " "    Birth control/protection: Surgical     Comment: MONIKA       FHx:   Family History   Problem Relation Age of Onset   • Immunodeficiency Mother    • Emphysema Father    • Breast cancer Neg Hx    • Ovarian cancer Neg Hx    • Colon cancer Neg Hx    • Deep vein thrombosis Neg Hx    • Pulmonary embolism Neg Hx          OB History       Para Term  AB Living     2 2 2     2     SAB TAB Ectopic Molar Multiple Live Births                           Obstetric Comments     2 C/S             Menarche: 14  Current contraception: status post hysterectomy  History of abnormal Pap smear: no  History of abnormal mammogram: no  Family history of uterine, colon or ovarian cancer: no  Family history of breast cancer: no  H/o STDs: none  Gardasil: none        Allergies: Patient has no known allergies.    Medications:   No current facility-administered medications on file prior to encounter.      Current Outpatient Medications on File Prior to Encounter   Medication Sig Dispense Refill   • amphetamine-dextroamphetamine (ADDERALL) 20 MG tablet Take 1 tablet by mouth 2 (Two) Times a Day. 60 tablet 0   • FLUoxetine (PROzac) 40 MG capsule Take 1 capsule by mouth Daily. 90 capsule 1   • topiramate (TOPAMAX) 25 MG tablet Take 1 tablet by mouth 2 (Two) Times a Day. 180 tablet 1       /76 (BP Location: Right arm, Patient Position: Sitting)   Pulse 79   Temp 98.6 °F (37 °C) (Oral)   Resp 15   Ht 162.6 cm (64\")   Wt 59 kg (130 lb)   SpO2 99%   BMI 22.31 kg/m²     Review of Systems   Gastrointestinal: Negative for abdominal pain, constipation, diarrhea, nausea and vomiting.   Genitourinary: Positive for vaginal bleeding. Negative for menstrual problem, pelvic pain, vaginal discharge and vaginal pain.   Musculoskeletal: Negative for back pain.   Neurological: Negative for dizziness and light-headedness.   Psychiatric/Behavioral: The patient is nervous/anxious.    All other systems reviewed and are negative.      Physical " Exam   Constitutional: She is oriented to person, place, and time. She appears well-developed and well-nourished.   HENT:   Head: Normocephalic and atraumatic.   Eyes: Conjunctivae are normal. No scleral icterus.   Neck: Neck supple.   Cardiovascular: Normal rate, regular rhythm and normal heart sounds.   Pulmonary/Chest: Effort normal and breath sounds normal.   Abdominal: Soft. Bowel sounds are normal. She exhibits no distension and no mass. There is no tenderness. There is no rebound and no guarding. No hernia.   Genitourinary: Pelvic exam was performed with patient supine. There is no rash, tenderness, lesion or injury on the right labia. There is no rash, tenderness, lesion or injury on the left labia.   Genitourinary Comments: Pt with heavy bright red vaginal bleeding , soaking a pad, no further exam attempted   Neurological: She is alert and oriented to person, place, and time.   Skin: Skin is warm and dry.   Psychiatric: She has a normal mood and affect. Her behavior is normal. Judgment and thought content normal.   Nursing note and vitals reviewed.      Debilities/Disabilities Identified: None    Labs:     Lab Results (last 7 days)     Procedure Component Value Units Date/Time    CBC & Differential [[id number]] Collected:  07/16/19 1837    Specimen:  Blood Updated:  07/16/19 1847    Narrative:       The following orders were created for panel order CBC & Differential.  Procedure                               Abnormality         Status                     ---------                               -----------         ------                     CBC Auto Differential[[id number]]        Abnormal            Final result                 Please view results for these tests on the individual orders.    CBC Auto Differential [[id number]]  (Abnormal) Collected:  07/16/19 1837    Specimen:  Blood Updated:  07/16/19 1847     WBC 11.38 10*3/mm3      RBC 4.29 10*6/mm3      Hemoglobin 13.1 g/dL      Hematocrit 40.6 %      MCV  94.6 fL      MCH 30.5 pg      MCHC 32.3 g/dL      RDW 13.3 %      RDW-SD 46.0 fl      MPV 10.7 fL      Platelets 288 10*3/mm3      Neutrophil % 73.7 %      Lymphocyte % 19.5 %      Monocyte % 3.4 %      Eosinophil % 2.4 %      Basophil % 0.7 %      Immature Grans % 0.3 %      Neutrophils, Absolute 8.39 10*3/mm3      Lymphocytes, Absolute 2.22 10*3/mm3      Monocytes, Absolute 0.39 10*3/mm3      Eosinophils, Absolute 0.27 10*3/mm3      Basophils, Absolute 0.08 10*3/mm3      Immature Grans, Absolute 0.03 10*3/mm3      nRBC 0.0 /100 WBC           Assessment/Plan:   1. 42 yo with heavy vaginal bleeding after sex, s/p distant [facility] ( 2015)- we discussed the differential diagnosis which includes a vaginal laceration, vaginal cuff dehiscence or other.  We will plan a exam under anesthesia, possible diagnostic laparoscopy, possible exploratory laparotomy.Risks, benefits and alternatives of the procedure were discussed, including , but not limited to: infection, bleeding, transfusion, injury to adjacent structures, laparotomy, possible non diagnostic findings, reoperation, recurrent symptoms, thromboembolic events, aneasthesic complications and death. Pre/intra/postop course was reviewed and all questions answered. Patient was encouraged to call for any additional questions she might have in the future.           I discussed the patients findings and my recommendations with patient.     Brenda Almazan MD  07/16/19  8:41 PM

## 2019-07-17 NOTE — ANESTHESIA PROCEDURE NOTES
Airway  Urgency: elective    Airway not difficult    General Information and Staff    Patient location during procedure: OR  CRNA: Brody Schmidt CRNA    Indications and Patient Condition  Indications for airway management: airway protection    Preoxygenated: yes  MILS maintained throughout  Mask difficulty assessment: 1 - vent by mask    Final Airway Details  Final airway type: supraglottic airway      Successful airway: classic  Size 3    Number of attempts at approach: 1    Additional Comments  To OR, monitors and O2 on. Smooth IV ind. - LMA inserted  x 1 attempt + BBS. Tape OU. Pressure points checked and padded

## 2019-07-17 NOTE — DISCHARGE SUMMARY
Date of Admission: 7/16/2019  5:47 PM      Date of Discharge:  7/17/2019    Admitting Service: [facility]    Admission Diagnosis:   vaginal bleeding after sex  S/P hysterectomy in 2015    Discharge Diagnosis:  Same, microperforation of cuff  Vaginal cuff dehiscence    Presenting Problem/History of Present Illness  Vaginal laceration, initial encounter [S31.41XA]  Vaginal laceration, initial encounter [S31.41XA]       Hospital Course  Ms. Ruth Casillas is a magdi 43-year-old G2, P2 who underwent a laparoscopic hysterectomy without complication in December 2015.  She was sexually active for the first time today and noticed no pain but had heavy vaginal bleeding.  She arrived to the emergency room and was noted to have vaginal bleeding from the apex of the vagina. She underwent an exam under anesthesia and a diagnostic scope and the vaginal cuff was intact deeply but had a superficial skin separation at the apex.  After insufflation, saw small bubble were seen  in the left fornix of the cuff, concerning for microperforation. Diagnostic laparoscopy showed no bowel at the cuff.  No obvious perforation seen laparoscopically but this was certainly a partial vaginal cuff dehiscence. This was repaired in the OR and the patient was watched overnight and had no further vaginal bleeding and minimal pain. We discussed that she will need to stop smoking, use vaginal estrogen and undergo vaginal rest for at least 8 weeks.         Procedures Performed  Procedure(s):  EXAM UNDER ANESTHESIA  DIAGNOSTIC LAPAROSCOPY       Consults:   Consults     No orders found for last 30 day(s).          Pertinent Test Results: HgB 12.4    Condition on Discharge:  good    Vital Signs  Temp:  [96.3 °F (35.7 °C)-98.6 °F (37 °C)] 97.8 °F (36.6 °C)  Heart Rate:  [54-83] 77  Resp:  [11-17] 13  BP: ()/() 107/68    Physical Exam:  Physical Exam   Constitutional: She is oriented to person, place, and time. She appears well-developed and  well-nourished.   HENT:   Head: Normocephalic and atraumatic.   Eyes: Conjunctivae are normal. No scleral icterus.   Abdominal: Soft. Bowel sounds are normal. She exhibits no distension and no mass. There is no tenderness. There is no rebound and no guarding. No hernia.   Inc C/D/I   Neurological: She is alert and oriented to person, place, and time.   Skin: Skin is warm and dry.   Psychiatric: She has a normal mood and affect. Her behavior is normal. Judgment and thought content normal.   Nursing note and vitals reviewed.      Discharge Disposition  Home or Self Care    Discharge Medications     Discharge Medications      New Medications      Instructions Start Date   docusate sodium 100 MG capsule   100 mg, Oral, 2 Times Daily PRN      ibuprofen 600 MG tablet  Commonly known as:  ADVIL,MOTRIN   600 mg, Oral, Every 6 Hours PRN      oxyCODONE-acetaminophen 5-325 MG per tablet  Commonly known as:  PERCOCET   1 tablet, Oral, Every 4 Hours PRN         Continue These Medications      Instructions Start Date   amphetamine-dextroamphetamine 20 MG tablet  Commonly known as:  ADDERALL   20 mg, Oral, 2 Times Daily - RT      FLUoxetine 40 MG capsule  Commonly known as:  PROzac   40 mg, Oral, Daily      topiramate 25 MG tablet  Commonly known as:  TOPAMAX   25 mg, Oral, 2 Times Daily             Discharge Diet: regular    Activity at Discharge: pelvic rest x 8 weeks    Follow-up Appointments  Future Appointments   Date Time Provider Department Center   7/17/2019 10:00 AM Brenda Almazan MD [facility] EP None         Test Results Pending at Discharge       Brenda Almazan MD  07/17/19  7:47 AM    Time: Discharge 25 min

## 2019-07-17 NOTE — OP NOTE
Subjective     Date of Service:  07/16/19  Time of Service:  9:52 PM    Surgical Staff: Surgeon(s) and Role:     * Brenda Almazan MD - Primary   Additional Staff: Javier Ball CFA   Pre-operative diagnosis(es): Heavy vaginal bleeding after sex  S/P hysterectomy in 2015     Post-operative diagnosis(es): Same, plus:  Vaginal cuff bleeding, suspect microperforation.    Procedure(s): Procedure(s):  EXAM UNDER ANESTHESIA, repair of partial vaginal cuff dehiscence  DIAGNOSTIC LAPAROSCOPY     Antibiotics: cefazolin (Ancef) ordered on call to OR     Anesthesia: Type: Choice  ASA:  II     Objective      Operative findings:  Heavy vaginal bleeding, vaginal cuff was intact deeply but had a superficial skin separation at the apex.  After insufflation, saw small bubbles in the left fornix of the cuff, concerning for microperforation.    Vaginal atrophy.  Diagnostic laparoscopy showed no bowel at the cuff.  No obvious perforation seen laparoscopically.   Specimens removed: None   Fluid Intake: 200 mL   Output: Documented Output  Est. Blood Loss 10 mL, intraop, 300 cc total EBL  Urine Output 100mL      I/O this shift:  In: -   Out: 100 [Urine:100]     Blood products used: No   Drains:     Implant Information: Nothing was implanted during the procedure   Complications: None apparent   Intraoperative consult(s):    Condition: stable   Disposition: to PACU and [facility]         Assessment/Plan     Ms. Ruth Casillas is a magdi 43-year-old G2, P2 who underwent a laparoscopic hysterectomy without complication in December 2015.  She was sexually active for the first time today and noticed no pain but had heavy vaginal bleeding.  She arrived to the emergency room and was noted to have vaginal bleeding from the apex of the vagina.  Her CT scan was negative and did not show any air in the pelvis or fluid collections in the pelvis.  When I saw her I suspect that she probably had already lost about 300 cc of  blood.  I recommended exam under anesthesia for evaluation and repair of the vaginal laceration and possible cuff dehiscence.  She was also consented for diagnostic laparoscopy and possible expiratory laparotomy.  After informed consent was obtained, the patient was taken to the operating room where general tracheal anesthesia was administered without difficulty.  The vaginal cuff palpated to be intact.  A bivalve speculum was placed in vagina.  The vagina was noted to be atrophic and not consistent with a 43-year-old patient.  There was noted to be an area of superficial skin separation with active vaginal bleeding at the vaginal cuff.  This looks like a partial vaginal dehiscence that would occur in a easily postoperative patient, not one that was operated on 2015.  Given the amount of bleeding that she was having, I did want to make sure that there was no evidence of any internal damage.  Gloves were then changed and infraumbilical skin incision was made.  The Veress needle was inserted to 45 degree angle.  Water abscesses positive.  Opening pressures balloons patient was begun delivered attempting all 4 quadrants.  A 5 mm clear view trocar was then placed under direct visualization directed to the abdomen was confirmed with the laparoscope.  Patient was then placed in Trendelenburg.  Her pelvis appeared to be totally normal.  Her bowel moved away easily from the pelvis.  There was no evidence of any active bleeding from the area of the vaginal cuff.  The laparoscope was then placed on standby and the cuff was repaired from below with 0 Vicryl sutures in an interrupted fashion.  There was noted to be small bubbles made in the corner of the left fornix, which is consistent with a microperforation.  This was only seen once and stopped after the sutures were placed.  There is no further vaginal bleeding.  [place] speculum was removed from the vagina.  The scope was then used to reevaluate the cuff and there was  noted to be a small area of disruption in the left fornix which may have been a very small microperforation.  All instruments were removed from the abdomen gas was allowed to escape.  The incision was closed with 3-0 Monocryl.  All counts were correct for the procedure.  The patient will be observed overnight to monitor her vaginal bleeding and to trend her hemoglobin.    Brenda Almazan MD

## 2019-07-17 NOTE — ANESTHESIA PREPROCEDURE EVALUATION
Anesthesia Evaluation     Nursing notes reviewed   no history of anesthetic complications:  NPO Solid Status: > 8 hours  NPO Liquid Status: > 4 hours           Airway   Mallampati: I  TM distance: >3 FB  Neck ROM: full  No difficulty expected  Dental - normal exam     Pulmonary - normal exam    breath sounds clear to auscultation  (+) a smoker (1/2 PPD) Current Smoked day of surgery,   Cardiovascular - negative cardio ROS and normal exam    ECG reviewed  Rhythm: regular  Rate: normal      ROS comment: 1/3/17 stress test: Interpretation Summary     · No ECG evidence of myocardial ischemia.Negative clinical evidence of myocardial ischemia. Findings consistent with a normal ECG stress test.        Neuro/Psych  (+) psychiatric history Anxiety, Depression and ADHD,     GI/Hepatic/Renal/Endo      Musculoskeletal (-) negative ROS    Abdominal  - normal exam   Substance History   (+) alcohol use (occ),      OB/GYN negative ob/gyn ROS         Other - negative ROS                       Anesthesia Plan    ASA 2 - emergent     general     intravenous induction   Anesthetic plan, all risks, benefits, and alternatives have been provided, discussed and informed consent has been obtained with: patient.  Use of blood products discussed with patient  Consented to blood products.

## 2019-07-22 RX ORDER — IBUPROFEN 600 MG/1
TABLET ORAL
Qty: 30 TABLET | Refills: 0 | Status: SHIPPED | OUTPATIENT
Start: 2019-07-22 | End: 2019-08-04 | Stop reason: SDUPTHER

## 2019-08-01 ENCOUNTER — OFFICE VISIT (OUTPATIENT)
Dept: OBSTETRICS AND GYNECOLOGY | Facility: CLINIC | Age: 44
End: 2019-08-01

## 2019-08-01 VITALS
DIASTOLIC BLOOD PRESSURE: 80 MMHG | SYSTOLIC BLOOD PRESSURE: 120 MMHG | WEIGHT: 135 LBS | BODY MASS INDEX: 23.05 KG/M2 | HEIGHT: 64 IN

## 2019-08-01 DIAGNOSIS — F17.200 SMOKER: ICD-10-CM

## 2019-08-01 DIAGNOSIS — S31.41XD VAGINAL LACERATION, SUBSEQUENT ENCOUNTER: ICD-10-CM

## 2019-08-01 DIAGNOSIS — N95.2 VAGINAL ATROPHY: Primary | ICD-10-CM

## 2019-08-01 PROCEDURE — 99406 BEHAV CHNG SMOKING 3-10 MIN: CPT | Performed by: OBSTETRICS & GYNECOLOGY

## 2019-08-01 PROCEDURE — 99024 POSTOP FOLLOW-UP VISIT: CPT | Performed by: OBSTETRICS & GYNECOLOGY

## 2019-08-01 RX ORDER — ESTRADIOL 0.1 MG/G
CREAM VAGINAL
Qty: 45 G | Refills: 6 | Status: SHIPPED | OUTPATIENT
Start: 2019-08-01 | End: 2019-11-21 | Stop reason: SDUPTHER

## 2019-08-01 NOTE — PROGRESS NOTES
"      Ruth Casillas is a 43 y.o. patient who presents for follow up of   Chief Complaint   Patient presents with   • Post-op     44 yo est pt here for postop check. She had a TLH in 2015 and had sex for the first time on 7/16/19 and had profuse vaginal bleeding.  She was seen in the emergency room and had a laceration.  Took her to the operating room and this appeared to be right at her cuff and her exam was concerning for microperforation.  She had a vaginal repair of the cuff.  She also had a diagnostic laparoscopy to ensure that there was no internal damage and that was normal.  She does have significant vaginal dryness.  She has been \"completely freaking out\" about her vagina and why this happened.  Her only risk factors are low levels of estrogen/atrophy and smoking.  She is trying to cut down on smoking.        The following portions of the patient's history were reviewed and updated as appropriate: allergies, current medications and problem list.    Review of Systems   Constitutional: Negative for chills and fever.   Gastrointestinal: Negative for abdominal pain.   Genitourinary: Negative for pelvic pain, vaginal bleeding, vaginal discharge and vaginal pain.   Psychiatric/Behavioral: The patient is nervous/anxious.    All other systems reviewed and are negative.      /80   Ht 162.6 cm (64\")   Wt 61.2 kg (135 lb)   BMI 23.17 kg/m²     Physical Exam   Constitutional: She is oriented to person, place, and time. She appears well-developed and well-nourished.   HENT:   Head: Normocephalic and atraumatic.   Eyes: Conjunctivae are normal. No scleral icterus.   Abdominal: Soft. Bowel sounds are normal. She exhibits no distension and no mass. There is no tenderness. There is no rebound and no guarding. No hernia.   Genitourinary: Pelvic exam was performed with patient supine. There is no rash, tenderness, lesion or injury on the right labia. There is no rash, tenderness, lesion or injury on the left labia. " Right adnexum displays no mass, no tenderness and no fullness. Left adnexum displays no mass, no tenderness and no fullness. No erythema, tenderness or bleeding in the vagina. No foreign body in the vagina. No signs of injury around the vagina. No vaginal discharge found.   Genitourinary Comments: Moderate atrophy noted  Suture line intact  No masses   Neurological: She is alert and oriented to person, place, and time.   Skin: Skin is warm and dry.   Psychiatric: She has a normal mood and affect. Her behavior is normal. Judgment and thought content normal.   Nursing note and vitals reviewed.    A/P:  1. Vaginal laceration at cuff/partial dehiscence-status post repair in the OR.  Check NuSwab Y/B/L/M  2. Vaginal atrophy-check FSH and E2.  Start Estrace cream half gram per vagina daily for the next month.  3. Smoker- Ruth Casillas is a current cigarettes user.  She currently smokes 15 pack of cigarettes and cigarettes per day for a duration of 1 day. I have educated her on the risk of diseases from using tobacco products such as reproductive problems and poor vaginal healing. I advised her to quit and she is willing to quit. We have discussed the following method/s for tobacco cessation:  Education Material Counseling [place] OTC Cessation Products.  Together we have set a quit date for 1 week from today.  I spent 5 minutes counseling the patient.  4. RTO 1 month recheck vagina. Cont vaginal rest        Assessment/Plan   Ruth was seen today for post-op.    Diagnoses and all orders for this visit:    Vaginal atrophy  -     Follicle Stimulating Hormone  -     Estradiol  -     NuSwab VG+ - Swab, Vagina  -     Genital Mycoplasmas LOPEZ, Swab - Swab, Vagina    Smoker    Vaginal laceration, subsequent encounter    Other orders  -     estradiol (ESTRACE) 0.1 MG/GM vaginal cream; 1/2 gram per vagina nightly for 1 month                   No Follow-up on file.      Brenda Almazan MD    8/1/2019  12:49 PM

## 2019-08-02 LAB
ESTRADIOL SERPL-MCNC: 96.6 PG/ML
FSH SERPL-ACNC: 15.8 MIU/ML

## 2019-08-05 RX ORDER — IBUPROFEN 600 MG/1
TABLET ORAL
Qty: 30 TABLET | Refills: 0 | Status: SHIPPED | OUTPATIENT
Start: 2019-08-05 | End: 2019-11-21

## 2019-08-06 LAB
A VAGINAE DNA VAG QL NAA+PROBE: NORMAL SCORE
BVAB2 DNA VAG QL NAA+PROBE: NORMAL SCORE
C ALBICANS DNA VAG QL NAA+PROBE: NEGATIVE
C GLABRATA DNA VAG QL NAA+PROBE: NEGATIVE
C TRACH RRNA SPEC QL NAA+PROBE: NEGATIVE
LABORATORY COMMENT REPORT: ABNORMAL
M GENITALIUM DNA SPEC QL NAA+PROBE: NEGATIVE
M HOMINIS DNA SPEC QL NAA+PROBE: NEGATIVE
MEGA1 DNA VAG QL NAA+PROBE: NORMAL SCORE
N GONORRHOEA RRNA SPEC QL NAA+PROBE: NEGATIVE
T VAGINALIS RRNA SPEC QL NAA+PROBE: NEGATIVE
UREAPLASMA DNA SPEC QL NAA+PROBE: POSITIVE

## 2019-08-07 DIAGNOSIS — F90.2 ATTENTION DEFICIT HYPERACTIVITY DISORDER (ADHD), COMBINED TYPE: ICD-10-CM

## 2019-08-07 RX ORDER — DOXYCYCLINE HYCLATE 100 MG
100 TABLET ORAL 2 TIMES DAILY
Qty: 14 TABLET | Refills: 0 | Status: SHIPPED | OUTPATIENT
Start: 2019-08-07 | End: 2019-08-14

## 2019-08-07 RX ORDER — DEXTROAMPHETAMINE SACCHARATE, AMPHETAMINE ASPARTATE, DEXTROAMPHETAMINE SULFATE AND AMPHETAMINE SULFATE 5; 5; 5; 5 MG/1; MG/1; MG/1; MG/1
20 TABLET ORAL
Qty: 60 TABLET | Refills: 0 | Status: SHIPPED | OUTPATIENT
Start: 2019-08-07 | End: 2019-09-09 | Stop reason: SDUPTHER

## 2019-08-10 DIAGNOSIS — F90.2 ATTENTION DEFICIT HYPERACTIVITY DISORDER (ADHD), COMBINED TYPE: ICD-10-CM

## 2019-08-12 RX ORDER — DEXTROAMPHETAMINE SACCHARATE, AMPHETAMINE ASPARTATE, DEXTROAMPHETAMINE SULFATE AND AMPHETAMINE SULFATE 5; 5; 5; 5 MG/1; MG/1; MG/1; MG/1
20 TABLET ORAL
Qty: 60 TABLET | Refills: 0 | OUTPATIENT
Start: 2019-08-12

## 2019-09-09 DIAGNOSIS — F90.2 ATTENTION DEFICIT HYPERACTIVITY DISORDER (ADHD), COMBINED TYPE: ICD-10-CM

## 2019-09-09 RX ORDER — DEXTROAMPHETAMINE SACCHARATE, AMPHETAMINE ASPARTATE, DEXTROAMPHETAMINE SULFATE AND AMPHETAMINE SULFATE 5; 5; 5; 5 MG/1; MG/1; MG/1; MG/1
20 TABLET ORAL
Qty: 60 TABLET | Refills: 0 | Status: SHIPPED | OUTPATIENT
Start: 2019-09-09 | End: 2019-09-09 | Stop reason: SDUPTHER

## 2019-09-09 RX ORDER — DEXTROAMPHETAMINE SACCHARATE, AMPHETAMINE ASPARTATE, DEXTROAMPHETAMINE SULFATE AND AMPHETAMINE SULFATE 5; 5; 5; 5 MG/1; MG/1; MG/1; MG/1
20 TABLET ORAL
Qty: 60 TABLET | Refills: 0 | Status: SHIPPED | OUTPATIENT
Start: 2019-09-09 | End: 2019-10-15 | Stop reason: SDUPTHER

## 2019-09-12 ENCOUNTER — OFFICE VISIT (OUTPATIENT)
Dept: OBSTETRICS AND GYNECOLOGY | Facility: CLINIC | Age: 44
End: 2019-09-12

## 2019-09-12 VITALS
BODY MASS INDEX: 23.42 KG/M2 | WEIGHT: 137.2 LBS | SYSTOLIC BLOOD PRESSURE: 106 MMHG | DIASTOLIC BLOOD PRESSURE: 70 MMHG | HEIGHT: 64 IN

## 2019-09-12 DIAGNOSIS — N89.8 VAGINAL DRYNESS: ICD-10-CM

## 2019-09-12 DIAGNOSIS — F17.200 SMOKER: ICD-10-CM

## 2019-09-12 DIAGNOSIS — S31.41XD VAGINAL LACERATION, SUBSEQUENT ENCOUNTER: ICD-10-CM

## 2019-09-12 DIAGNOSIS — Z13.9 SCREENING FOR CONDITION: Primary | ICD-10-CM

## 2019-09-12 PROCEDURE — 99406 BEHAV CHNG SMOKING 3-10 MIN: CPT | Performed by: OBSTETRICS & GYNECOLOGY

## 2019-09-12 PROCEDURE — 99024 POSTOP FOLLOW-UP VISIT: CPT | Performed by: OBSTETRICS & GYNECOLOGY

## 2019-09-12 NOTE — PROGRESS NOTES
"Ruth Casillas is a 43 y.o. patient who presents for follow up of stretch and therapy.  She had a TLH in 2015 and had sex with the first time on 7/16/2019 had profuse vaginal bleeding and was subsequently diagnosed with a microperforation of her cough.  She had a repair under anesthesia as well as a diagnostic laparoscopy which was normal.  She has been using Estrace daily.  She states she feels fine and is not having any pain, bleeding or dryness.  She is very nervous about having sex again, which is completely understandable.  We did discuss possible referral to urogynecology for evaluation she declines at this time.  She is working on smoking cessation but is still smoking, especially when stressed.          The following portions of the patient's history were reviewed and updated as appropriate: allergies, current medications and problem list.    Review of Systems   Constitutional: Negative for appetite change.   Gastrointestinal: Negative for abdominal pain.   Genitourinary: Negative for pelvic pain, vaginal bleeding, vaginal discharge and vaginal pain.   Musculoskeletal: Negative for back pain.   All other systems reviewed and are negative.      /70   Ht 162.6 cm (64.02\")   Wt 62.2 kg (137 lb 3.2 oz)   Breastfeeding? No   BMI 23.54 kg/m²     Physical Exam   Constitutional: She is oriented to person, place, and time. She appears well-developed and well-nourished.   HENT:   Head: Normocephalic and atraumatic.   Eyes: Conjunctivae are normal. No scleral icterus.   Abdominal: Soft. Bowel sounds are normal. She exhibits no distension and no mass. There is no tenderness. There is no rebound and no guarding. No hernia.   Genitourinary: Pelvic exam was performed with patient supine. There is no rash, tenderness, lesion or injury on the right labia. There is no rash, tenderness, lesion or injury on the left labia. Right adnexum displays no mass, no tenderness and no fullness. Left adnexum displays no " mass, no tenderness and no fullness. No erythema, tenderness or bleeding in the vagina. No foreign body in the vagina. No signs of injury around the vagina. No vaginal discharge found.   Genitourinary Comments: Cuff appears to be normal and well-healed.  No palpable masses.  No tenderness at cuff.   Neurological: She is alert and oriented to person, place, and time.   Skin: Skin is warm and dry.   Psychiatric: She has a normal mood and affect. Her behavior is normal. Judgment and thought content normal.   Nursing note and vitals reviewed.      A/P:  1.  Status post repair of microperforation of cuff-cuff appears to be normal,  however, it also appeared normal after her hysterectomy and all of her annual exams.  I would recommend that she undergo vaginal rest for an additional month.  At that point she is cleared to have sex.  She is advised to call for any pain or discomfort.  2. Atrophy- improving, change E2 cream to M,W,F  3. Schedule mammogram  4. Smoker-patient counseled 4 minutes for smoking cessation.  We discussed that her tissue will never heal completely if she continues to smoke.  5. RHM- RTO 11/2019 annual or prn.     Assessment/Plan   Ruth was seen today for follow-up.    Diagnoses and all orders for this visit:    Screening for condition  -     Mammo Screening Bilateral With CAD; Future    Smoker    Vaginal laceration, subsequent encounter                 No Follow-up on file.      Brenda Almazan MD    9/12/2019  3:16 PM

## 2019-10-02 ENCOUNTER — IMMUNIZATION (OUTPATIENT)
Dept: RETAIL CLINIC | Facility: CLINIC | Age: 44
End: 2019-10-02

## 2019-10-02 DIAGNOSIS — Z23 NEED FOR VACCINATION: Primary | ICD-10-CM

## 2019-10-02 PROCEDURE — 90674 CCIIV4 VAC NO PRSV 0.5 ML IM: CPT | Performed by: NURSE PRACTITIONER

## 2019-10-02 PROCEDURE — 90471 IMMUNIZATION ADMIN: CPT | Performed by: NURSE PRACTITIONER

## 2019-10-02 NOTE — PATIENT INSTRUCTIONS
Preventing Influenza, Adult  Influenza, more commonly known as “the flu,” is a viral infection that mainly affects the respiratory tract. The respiratory tract includes structures that help you breathe, such as the lungs, nose, and throat. The flu causes many common cold symptoms, as well as a high fever and body aches.  The flu spreads easily from person to person (is contagious). The flu is most common from December through March. This is called flu season. You can catch the flu virus by:  · Breathing in droplets from an infected person's cough or sneeze.  · Touching something that was recently contaminated with the virus and then touching your mouth, nose, or eyes.  What can I do to lower my risk?  You can decrease your risk of getting the flu by:  · Getting a flu shot (influenza vaccination) every year. This is the best way to prevent the flu. A flu shot is recommended for everyone age 6 months and older.  ? It is best to get a flu shot in the fall, as soon as it is available. Getting a flu shot during winter or spring instead is still a good idea. Flu season can last into early spring.  ? Preventing the flu through vaccination requires getting a new flu shot every year. This is because the flu virus changes slightly (mutates) from one year to the next. Even if a flu shot does not completely protect you from all flu virus mutations, it can reduce the severity of your illness and prevent dangerous complications of the flu.  ? If you are pregnant, you can and should get a flu shot.  ? If you have had a reaction to the shot in the past or if you are allergic to eggs, check with your health care provider before getting a flu shot.  ? Sometimes the vaccine is available as a nasal spray. In some years, the nasal spray has not been as effective against the flu virus. Check with your health care provider if you have questions about this.  · Practicing good health habits. This is especially important during flu  season.  ? Avoid contact with people who are sick with flu or cold symptoms.  ? Wash your hands with soap and water often. If soap and water are not available, use alcohol-based hand .  ? Avoid touching your hands to your face, especially when you have not washed your hands recently.  ? Use a disinfectant to clean surfaces at home and at work that may be contaminated with the flu virus.  ? Keep your body’s disease-fighting system (immune system) in good shape by eating a healthy diet, drinking plenty of fluids, getting enough sleep, and exercising regularly.  If you do get the flu, avoid spreading it to others by:  · Staying home until your symptoms have been gone for at least one day.  · Covering your mouth and nose when you cough or sneeze.  · Avoiding close contact with others, especially babies and elderly people.  Why are these changes important?  Getting a flu shot and practicing good health habits protects you as well as other people. If you get the flu, your friends, family, and co-workers are also at risk of getting it, because it spreads so easily to others. Each year, about 2 out of every 10 people get the flu.  Having the flu can lead to complications, such as pneumonia, ear infection, and sinus infection. The flu also can be deadly, especially for babies, people older than age 65, and people who have serious long-term diseases.  How is this treated?  Most people recover from the flu by resting at home and drinking plenty of fluids. However, a prescription antiviral medicine may reduce your flu symptoms and may make your flu go away sooner. This medicine must be started within a few days of getting flu symptoms. You can talk with your health care provider about whether you need an antiviral medicine.  Antiviral medicine may be prescribed for people who are at risk for more serious flu symptoms. This includes people who:  · Are older than age 65.  · Are pregnant.  · Have a condition that makes  the flu worse or more dangerous.  Where to find more information  · Centers for Disease Control and Prevention: www.cdc.gov/flu/index.htm  · Flu.gov: www.flu.gov/prevention-vaccination  · American Academy of Family [employer]icians: familydoctor.org/familydoctor/en/kids/vaccines/preventing-the-flu.html  Contact a health care provider if:  · You have influenza and you develop new symptoms.  · You have:  ? Chest pain.  ? Diarrhea.  ? A fever.  · Your cough gets worse, or you produce more mucus.  Summary  · The best way to prevent the flu is to get a flu shot every year in the fall.  · Even if you get the flu after you have received the yearly vaccine, your flu may be milder and go away sooner because of your flu shot.  · If you get the flu, antiviral medicines that are started with a few days of symptoms may reduce your flu symptoms and may make your flu go away sooner.  · You can also help prevent the flu by practicing good health habits.  This information is not intended to replace advice given to you by your health care provider. Make sure you discuss any questions you have with your health care provider.  Document Released: 01/01/2017 Document Revised: 11/16/2018 Document Reviewed: 08/26/2017  [employer] Interactive Patient Education © 2019 [employer]

## 2019-10-15 DIAGNOSIS — F90.2 ATTENTION DEFICIT HYPERACTIVITY DISORDER (ADHD), COMBINED TYPE: ICD-10-CM

## 2019-10-15 RX ORDER — DEXTROAMPHETAMINE SACCHARATE, AMPHETAMINE ASPARTATE, DEXTROAMPHETAMINE SULFATE AND AMPHETAMINE SULFATE 5; 5; 5; 5 MG/1; MG/1; MG/1; MG/1
20 TABLET ORAL
Qty: 60 TABLET | Refills: 0 | Status: SHIPPED | OUTPATIENT
Start: 2019-10-15 | End: 2019-11-22 | Stop reason: SDUPTHER

## 2019-11-04 ENCOUNTER — OFFICE VISIT (OUTPATIENT)
Dept: INTERNAL MEDICINE | Facility: CLINIC | Age: 44
End: 2019-11-04

## 2019-11-04 VITALS
SYSTOLIC BLOOD PRESSURE: 122 MMHG | DIASTOLIC BLOOD PRESSURE: 70 MMHG | WEIGHT: 137 LBS | RESPIRATION RATE: 16 BRPM | HEIGHT: 64 IN | HEART RATE: 101 BPM | BODY MASS INDEX: 23.39 KG/M2

## 2019-11-04 DIAGNOSIS — F41.9 ANXIETY: Primary | ICD-10-CM

## 2019-11-04 DIAGNOSIS — F90.2 ATTENTION DEFICIT HYPERACTIVITY DISORDER (ADHD), COMBINED TYPE: ICD-10-CM

## 2019-11-04 PROCEDURE — 99214 OFFICE O/P EST MOD 30 MIN: CPT | Performed by: INTERNAL MEDICINE

## 2019-11-04 RX ORDER — BUPROPION HYDROCHLORIDE 150 MG/1
150 TABLET ORAL EVERY MORNING
Qty: 30 TABLET | Refills: 0 | Status: SHIPPED | OUTPATIENT
Start: 2019-11-04 | End: 2019-11-26 | Stop reason: SDUPTHER

## 2019-11-04 NOTE — PROGRESS NOTES
Ruth Casillas is a 43 y.o. female, who presents with a chief complaint of   Chief Complaint   Patient presents with   • ADD       HPI   The pt is here for follow up.       ADHD - pt on adderall.  She has done well on meds.  . She struggles with taking meds regularly.     anxiety - pt stopped her prozac.  She did notice that off the med she had less patience and cried more.  She felt like the 40mg dose kept her from having an orgasm.        She has had to switch jobs bc the [employer] in [facility].  She is now working in [employer].  Her 17 yo son was diagnosed with epilepsy and they have had to go to lots of dr wolfe.  he is struggling in school and doesn't want to take his meds.  He also has adhd.         The following portions of the patient's history were reviewed and updated as appropriate: allergies, current medications, past family history, past medical history, past social history, past surgical history and problem list.    Allergies: Patient has no known allergies.    Review of Systems   Constitutional: Negative.    HENT: Negative.    Eyes: Negative.    Respiratory: Negative.    Cardiovascular: Negative.    Gastrointestinal: Negative.    Endocrine: Negative.    Genitourinary: Negative.    Musculoskeletal: Negative.    Skin: Negative.    Allergic/Immunologic: Negative.    Neurological: Negative.    Hematological: Negative.    Psychiatric/Behavioral: Negative for self-injury and suicidal ideas. The patient is nervous/anxious.    All other systems reviewed and are negative.            Wt Readings from Last 3 Encounters:   11/04/19 62.1 kg (137 lb)   09/12/19 62.2 kg (137 lb 3.2 oz)   08/01/19 61.2 kg (135 lb)     Temp Readings from Last 3 Encounters:   07/17/19 98 °F (36.7 °C) (Oral)   12/28/18 98.2 °F (36.8 °C)   07/01/16 98.1 °F (36.7 °C) (Oral)     BP Readings from Last 3 Encounters:   11/04/19 122/70   09/12/19 106/70   08/01/19 120/80     Pulse Readings from Last 3 Encounters:    11/04/19 101   07/17/19 75   07/02/19 97     Body mass index is 23.52 kg/m².  @LASTSAO2(3)@    Physical Exam   Constitutional: She is oriented to person, place, and time. She appears well-developed and well-nourished. No distress.   HENT:   Head: Normocephalic and atraumatic.   Right Ear: External ear normal.   Left Ear: External ear normal.   Nose: Nose normal.   Mouth/Throat: Oropharynx is clear and moist.   Eyes: Conjunctivae and EOM are normal. Pupils are equal, round, and reactive to light.   Neck: Normal range of motion. Neck supple.   Cardiovascular: Normal rate, regular rhythm, normal heart sounds and intact distal pulses.   Pulmonary/Chest: Effort normal and breath sounds normal. No respiratory distress. She has no wheezes.   Musculoskeletal: Normal range of motion.   Normal gait   Neurological: She is alert and oriented to person, place, and time.   Skin: Skin is warm and dry.   Psychiatric: Her behavior is normal. Judgment and thought content normal.   anxious   Nursing note and vitals reviewed.      Results for orders placed or performed in visit on 08/01/19   Nuab VG+ - Swab, Vagina   Result Value Ref Range    Atopobium Vaginae Low - 0 Score    BVAB 2 Low - 0 Score    Megasphaera 1 Low - 0 Score    Candida Albicans, LOPEZ Negative Negative    Candida Glabrata, LOPEZ Negative Negative    Trichomonas vaginosis Negative Negative    Chlamydia trachomatis, LOPEZ Negative Negative    Neisseria gonorrhoeae, LOPEZ Negative Negative   Genital Mycoplasmas LOPEZ, Swab - Swab, Vagina   Result Value Ref Range    Mycoplasma genitalium NA Negative Negative    Mycoplasma hominis Negative Negative    Ureaplasma spp Positive (A) Negative    Comment Comment    Follicle Stimulating Hormone   Result Value Ref Range    FSH 15.8 mIU/mL   Estradiol   Result Value Ref Range    Estradiol 96.6 pg/mL           Ruth was seen today for add.    Diagnoses and all orders for this visit:    Anxiety  -     buPROPion XL (WELLBUTRIN XL) 150 MG  24 hr tablet; Take 1 tablet by mouth Every Morning.    Attention deficit hyperactivity disorder (ADHD), combined type    cont adderall      Outpatient Medications Prior to Visit   Medication Sig Dispense Refill   • amphetamine-dextroamphetamine (ADDERALL) 20 MG tablet Take 1 tablet by mouth 2 (Two) Times a Day. 60 tablet 0   • docusate sodium 100 MG capsule Take 100 mg by mouth 2 (Two) Times a Day As Needed for Constipation. 60 each 0   • estradiol (ESTRACE) 0.1 MG/GM vaginal cream 1/2 gram per vagina nightly for 1 month 45 g 6   • ibuprofen (ADVIL,MOTRIN) 600 MG tablet TAKE ONE TABLET BY MOUTH EVERY 6 HOURS AS NEEDED FOR MILD PAIN 30 tablet 0   • topiramate (TOPAMAX) 25 MG tablet Take 1 tablet by mouth 2 (Two) Times a Day. 180 tablet 1   • FLUoxetine (PROzac) 40 MG capsule Take 1 capsule by mouth Daily. 90 capsule 1     No facility-administered medications prior to visit.      New Medications Ordered This Visit   Medications   • buPROPion XL (WELLBUTRIN XL) 150 MG 24 hr tablet     Sig: Take 1 tablet by mouth Every Morning.     Dispense:  30 tablet     Refill:  0     [unfilled]  Medications Discontinued During This Encounter   Medication Reason   • FLUoxetine (PROzac) 40 MG capsule *Therapy completed         Return in about 3 months (around 2/4/2020) for Recheck, labs.

## 2019-11-21 ENCOUNTER — OFFICE VISIT (OUTPATIENT)
Dept: OBSTETRICS AND GYNECOLOGY | Facility: CLINIC | Age: 44
End: 2019-11-21

## 2019-11-21 VITALS
WEIGHT: 132 LBS | BODY MASS INDEX: 22.53 KG/M2 | HEIGHT: 64 IN | SYSTOLIC BLOOD PRESSURE: 120 MMHG | DIASTOLIC BLOOD PRESSURE: 72 MMHG

## 2019-11-21 DIAGNOSIS — Z13.9 SCREENING FOR CONDITION: ICD-10-CM

## 2019-11-21 DIAGNOSIS — F17.200 SMOKER: ICD-10-CM

## 2019-11-21 DIAGNOSIS — Z11.51 SPECIAL SCREENING EXAMINATION FOR HUMAN PAPILLOMAVIRUS (HPV): ICD-10-CM

## 2019-11-21 DIAGNOSIS — Z01.411 ENCOUNTER FOR GYNECOLOGICAL EXAMINATION WITH ABNORMAL FINDING: ICD-10-CM

## 2019-11-21 DIAGNOSIS — K62.89 RECTAL OR ANAL PAIN: ICD-10-CM

## 2019-11-21 DIAGNOSIS — Z01.419 PAP SMEAR, LOW-RISK: Primary | ICD-10-CM

## 2019-11-21 LAB
BILIRUB BLD-MCNC: NEGATIVE MG/DL
CLARITY, POC: CLEAR
COLOR UR: YELLOW
GLUCOSE UR STRIP-MCNC: NEGATIVE MG/DL
KETONES UR QL: NEGATIVE
LEUKOCYTE EST, POC: NEGATIVE
NITRITE UR-MCNC: NEGATIVE MG/ML
PH UR: 6 [PH] (ref 5–8)
PROT UR STRIP-MCNC: NEGATIVE MG/DL
RBC # UR STRIP: NEGATIVE /UL
SP GR UR: 1.03 (ref 1–1.03)
UROBILINOGEN UR QL: NORMAL

## 2019-11-21 PROCEDURE — 99396 PREV VISIT EST AGE 40-64: CPT | Performed by: OBSTETRICS & GYNECOLOGY

## 2019-11-21 PROCEDURE — 81002 URINALYSIS NONAUTO W/O SCOPE: CPT | Performed by: OBSTETRICS & GYNECOLOGY

## 2019-11-21 PROCEDURE — 99213 OFFICE O/P EST LOW 20 MIN: CPT | Performed by: OBSTETRICS & GYNECOLOGY

## 2019-11-21 RX ORDER — ESTRADIOL 0.1 MG/G
CREAM VAGINAL
Qty: 45 G | Refills: 6 | Status: SHIPPED | OUTPATIENT
Start: 2019-11-21 | End: 2021-02-04

## 2019-11-21 NOTE — PROGRESS NOTES
GYN Annual Exam     CC- Here for annual exam.     Ruth Casillas is a 43 y.o. female established patient who presents for annual well woman exam.  SHe had a TLH/BS with OC  In  for DUB. She had a partial cuff dehiscence and repair in 2019. She is using vaginal estrogen and has been able to have sex again. Wellbutrin is helping with smoking and anxiety. She having rectal pain and bleeding last year and did not follow-up with GI.  She said she called their office and was told there was little they could do to help her with problem.  She is still having rectal pressure discomfort at times, although it seems to be better than last year.  I have recommended that she see colorectal surgery to at least get a evaluation and possibly colonoscopy.    OB History      Para Term  AB Living    2 2 2     2    SAB TAB Ectopic Molar Multiple Live Births                       Obstetric Comments    2 C/S          Menarche: 14  Current contraception: status post hysterectomy  History of abnormal Pap smear: no  History of abnormal mammogram: no  Family history of uterine, colon or ovarian cancer: no  Family history of breast cancer: no  H/o STDs: none  Gardasil: none  Last pap: ?  ALFONSO: none  Late cuff dehiscence   Health Maintenance   Topic Date Due   • ANNUAL PHYSICAL  1977   • PNEUMOCOCCAL VACCINE (19-64 MEDIUM RISK) (1 of 1 - PPSV23) 1993   • TDAP/TD VACCINES (1 - Tdap) 1993   • PAP SMEAR  2016   • Annual Gynecologic Pelvic and Breast Exam  2019   • INFLUENZA VACCINE  Completed       Past Medical History:   Diagnosis Date   • ADD (attention deficit disorder)    • Anxiety    • Depression        Past Surgical History:   Procedure Laterality Date   •  SECTION      x 2   • DIAGNOSTIC LAPAROSCOPY N/A 2019    Procedure: DIAGNOSTIC LAPAROSCOPY;  Surgeon: Brenda Almazan MD;  Location: [facility];  Service: Obstetrics/Gynecology   • EXAM UNDER ANESTHESIA N/A  7/16/2019    Procedure: EXAM UNDER ANESTHESIA;  Surgeon: Brenda Almazan MD;  Location: [facility];  Service: Obstetrics/Gynecology   • HYSTERECTOMY  12/01/2015    TLH/BS with OC   • TUBAL ABDOMINAL LIGATION           Current Outpatient Medications:   •  amphetamine-dextroamphetamine (ADDERALL) 20 MG tablet, Take 1 tablet by mouth 2 (Two) Times a Day., Disp: 60 tablet, Rfl: 0  •  buPROPion XL (WELLBUTRIN XL) 150 MG 24 hr tablet, Take 1 tablet by mouth Every Morning., Disp: 30 tablet, Rfl: 0  •  estradiol (ESTRACE) 0.1 MG/GM vaginal cream, 1/2 gram per vagina nightly for 1 month, Disp: 45 g, Rfl: 6  •  topiramate (TOPAMAX) 25 MG tablet, Take 1 tablet by mouth 2 (Two) Times a Day., Disp: 180 tablet, Rfl: 1    No Known Allergies    Social History     Tobacco Use   • Smoking status: Heavy Tobacco Smoker     Packs/day: 0.50     Types: Cigarettes   • Smokeless tobacco: Never Used   Substance Use Topics   • Alcohol use: Yes     Alcohol/week: 1.2 oz     Types: 1 Glasses of wine, 1 Cans of beer per week     Comment: SOCIALLY 2-3 drinks per wk   • Drug use: No       Family History   Problem Relation Age of Onset   • Immunodeficiency Mother    • Emphysema Father    • Breast cancer Neg Hx    • Ovarian cancer Neg Hx    • Colon cancer Neg Hx    • Deep vein thrombosis Neg Hx    • Pulmonary embolism Neg Hx        Review of Systems   Constitutional: Negative for appetite change, fatigue, fever and unexpected weight change.   Respiratory: Negative for cough and shortness of breath.    Cardiovascular: Negative for chest pain and palpitations.   Gastrointestinal: Positive for rectal pain. Negative for abdominal distention, abdominal pain, anal bleeding, constipation, diarrhea and nausea.   Genitourinary: Negative for dyspareunia, dysuria, menstrual problem, pelvic pain and vaginal discharge.   Skin: Negative for color change and rash.   Neurological: Negative for syncope and headaches.   Psychiatric/Behavioral: Negative for  "dysphoric mood. The patient is not nervous/anxious.        /72   Ht 162.6 cm (64\")   Wt 59.9 kg (132 lb)   BMI 22.66 kg/m²     Physical Exam   Constitutional: She is oriented to person, place, and time. She appears well-developed and well-nourished.   HENT:   Head: Normocephalic and atraumatic.   Eyes: Conjunctivae are normal. No scleral icterus.   Neck: Neck supple. No thyromegaly present.   Cardiovascular: Normal rate, regular rhythm and normal heart sounds.   Pulmonary/Chest: Effort normal and breath sounds normal. Right breast exhibits no inverted nipple, no mass, no nipple discharge, no skin change and no tenderness. Left breast exhibits no inverted nipple, no mass, no nipple discharge, no skin change and no tenderness.   Abdominal: Soft. Bowel sounds are normal. She exhibits no distension and no mass. There is no tenderness. There is no rebound and no guarding. No hernia.   Genitourinary: Rectal exam shows no external hemorrhoid. Pelvic exam was performed with patient supine. There is no rash, tenderness or lesion on the right labia. There is no rash, tenderness or lesion on the left labia. Right adnexum displays no mass, no tenderness and no fullness. Left adnexum displays no mass, no tenderness and no fullness. No erythema, tenderness or bleeding in the vagina. No foreign body in the vagina. No signs of injury around the vagina. No vaginal discharge found.   Neurological: She is alert and oriented to person, place, and time.   Skin: Skin is warm and dry.   Psychiatric: She has a normal mood and affect. Her behavior is normal. Judgment and thought content normal.   Vitals reviewed.         Assessment/Plan    1) GYN HM: check pap/HPV   SBE demonstrated and encouraged.  2) STD screening: declines Condoms encouraged.  3) Contraception: s/p TLH  4) Family Planning: no plans, encourage folic acid daily  5) Diet and Exercise discussed  6) Smoking Status: I advised Ragen of the risks of continuing to use " tobacco, and I provided her with tobacco cessation educational materials.During this visit, I spent 4 minutes counseling the patient regarding tobacco cessation.  7) Rectal discomfort- pt talked to scheduling at GI and they did not seem helpful. Will refer C/R Dr Nithya Chiu  8) MMG- schedule.  He has not had a mammogram in several years, encourage compliance  9)Vaginal cuff dehiscence- doing better, has been able to have sex without pain. Cont vaginal E2 twice a week.   10)Parts of this document have been copied or forwarded from her previous visits and have been reviewed, updated and edited as indicated.   11)Follow up prn or 1 year      Ragen was seen today for gynecologic exam.    Diagnoses and all orders for this visit:    Pap smear, low-risk  -     Pap IG, HPV-hr    Screening for condition  -     POC Urinalysis Dipstick  -     Mammo Screening Bilateral With CAD; Future    Special screening examination for human papillomavirus (HPV)  -     Pap IG, HPV-hr    Rectal or anal pain  -     Ambulatory Referral to Colorectal Surgery    Encounter for gynecological examination with abnormal finding    Smoker          Brenda Almazan MD  11/21/2019  6:13 PM

## 2019-11-22 DIAGNOSIS — F90.2 ATTENTION DEFICIT HYPERACTIVITY DISORDER (ADHD), COMBINED TYPE: ICD-10-CM

## 2019-11-22 RX ORDER — DEXTROAMPHETAMINE SACCHARATE, AMPHETAMINE ASPARTATE, DEXTROAMPHETAMINE SULFATE AND AMPHETAMINE SULFATE 5; 5; 5; 5 MG/1; MG/1; MG/1; MG/1
20 TABLET ORAL
Qty: 60 TABLET | Refills: 0 | Status: SHIPPED | OUTPATIENT
Start: 2019-11-22 | End: 2019-12-23 | Stop reason: SDUPTHER

## 2019-11-26 DIAGNOSIS — G89.29 CHRONIC NONINTRACTABLE HEADACHE, UNSPECIFIED HEADACHE TYPE: ICD-10-CM

## 2019-11-26 DIAGNOSIS — R51.9 CHRONIC NONINTRACTABLE HEADACHE, UNSPECIFIED HEADACHE TYPE: ICD-10-CM

## 2019-11-26 DIAGNOSIS — F41.9 ANXIETY: ICD-10-CM

## 2019-11-26 LAB
CYTOLOGIST CVX/VAG CYTO: ABNORMAL
CYTOLOGY CVX/VAG DOC CYTO: ABNORMAL
CYTOLOGY CVX/VAG DOC THIN PREP: ABNORMAL
DX ICD CODE: ABNORMAL
DX ICD CODE: ABNORMAL
HIV 1 & 2 AB SER-IMP: ABNORMAL
HPV I/H RISK 1 DNA CVX QL PROBE+SIG AMP: POSITIVE
OTHER STN SPEC: ABNORMAL
PATHOLOGIST CVX/VAG CYTO: ABNORMAL
STAT OF ADQ CVX/VAG CYTO-IMP: ABNORMAL

## 2019-11-26 RX ORDER — BUPROPION HYDROCHLORIDE 150 MG/1
150 TABLET ORAL EVERY MORNING
Qty: 30 TABLET | Refills: 0 | Status: SHIPPED | OUTPATIENT
Start: 2019-11-26 | End: 2019-11-26 | Stop reason: SDUPTHER

## 2019-11-26 RX ORDER — TOPIRAMATE 25 MG/1
25 TABLET ORAL 2 TIMES DAILY
Qty: 180 TABLET | Refills: 1 | Status: SHIPPED | OUTPATIENT
Start: 2019-11-26 | End: 2020-04-13 | Stop reason: SDUPTHER

## 2019-11-26 RX ORDER — BUPROPION HYDROCHLORIDE 150 MG/1
TABLET ORAL
Qty: 30 TABLET | Refills: 0 | Status: SHIPPED | OUTPATIENT
Start: 2019-11-26 | End: 2019-12-20 | Stop reason: DRUGHIGH

## 2019-11-30 DIAGNOSIS — F41.9 ANXIETY: ICD-10-CM

## 2019-12-02 ENCOUNTER — TRANSCRIBE ORDERS (OUTPATIENT)
Dept: ADMINISTRATIVE | Facility: HOSPITAL | Age: 44
End: 2019-12-02

## 2019-12-02 DIAGNOSIS — Z12.31 VISIT FOR SCREENING MAMMOGRAM: Primary | ICD-10-CM

## 2019-12-02 RX ORDER — BUPROPION HYDROCHLORIDE 150 MG/1
TABLET ORAL
Qty: 90 TABLET | Refills: 0 | Status: SHIPPED | OUTPATIENT
Start: 2019-12-02 | End: 2019-12-17 | Stop reason: SDUPTHER

## 2019-12-16 ENCOUNTER — OFFICE VISIT (OUTPATIENT)
Dept: OBSTETRICS AND GYNECOLOGY | Facility: CLINIC | Age: 44
End: 2019-12-16

## 2019-12-16 VITALS
SYSTOLIC BLOOD PRESSURE: 110 MMHG | HEIGHT: 64 IN | WEIGHT: 127.2 LBS | BODY MASS INDEX: 21.72 KG/M2 | DIASTOLIC BLOOD PRESSURE: 74 MMHG

## 2019-12-16 DIAGNOSIS — R87.620 ATYPICAL SQUAMOUS CELL CHANGES OF UNDETERMINED SIGNIFICANCE (ASCUS) ON VAGINAL CYTOLOGY WITH POSITIVE HIGH RISK HUMAN PAPILLOMA VIRUS (HPV): ICD-10-CM

## 2019-12-16 DIAGNOSIS — F17.200 SMOKER: ICD-10-CM

## 2019-12-16 DIAGNOSIS — R87.811 ATYPICAL SQUAMOUS CELL CHANGES OF UNDETERMINED SIGNIFICANCE (ASCUS) ON VAGINAL CYTOLOGY WITH POSITIVE HIGH RISK HUMAN PAPILLOMA VIRUS (HPV): ICD-10-CM

## 2019-12-16 DIAGNOSIS — Z13.9 SCREENING FOR CONDITION: Primary | ICD-10-CM

## 2019-12-16 LAB
BILIRUB BLD-MCNC: NEGATIVE MG/DL
CLARITY, POC: CLEAR
COLOR UR: YELLOW
GLUCOSE UR STRIP-MCNC: NEGATIVE MG/DL
KETONES UR QL: NEGATIVE
LEUKOCYTE EST, POC: NEGATIVE
NITRITE UR-MCNC: NEGATIVE MG/ML
PH UR: 5 [PH] (ref 5–8)
PROT UR STRIP-MCNC: NEGATIVE MG/DL
RBC # UR STRIP: NEGATIVE /UL
SP GR UR: 1 (ref 1–1.03)
UROBILINOGEN UR QL: NORMAL

## 2019-12-16 PROCEDURE — 57420 EXAM OF VAGINA W/SCOPE: CPT | Performed by: OBSTETRICS & GYNECOLOGY

## 2019-12-16 PROCEDURE — 99406 BEHAV CHNG SMOKING 3-10 MIN: CPT | Performed by: OBSTETRICS & GYNECOLOGY

## 2019-12-16 PROCEDURE — 81002 URINALYSIS NONAUTO W/O SCOPE: CPT | Performed by: OBSTETRICS & GYNECOLOGY

## 2019-12-16 NOTE — PROGRESS NOTES
Colposcopy Procedure Note    Procedures          Indications: Most recent Pap smear showed: ASCUS with POSITIVE high risk HPV  Of cuff  Prior cervical/vaginal disease: This is her first abnormal pap  Prior cervical treatment: no treatment. She has had a TLH  Contraception: status post hysterectomy    Procedure Details   The risks and benefits of the procedure and Verbal informed consent obtained.  Pregnancy test: not done    Speculum placed in vagina and excellent visualization of cervix achieved, cervix swabbed x 3 with acetic acid solution and Lugol's solution     Findings:  Cervix: absent:  no visible lesions, no mosaicism, no punctation and no abnormal vasculature; no biopsies taken.  Vaginal inspection: normal without visible lesions.  Vulvar colposcopy: vulvar colposcopy not performed.  Colpo adequacy: was not adquate    Specimens: none, colpo impression, normal cuff    Complications: none.   Patient tolerated procedure well.     Smoking Status: yes I advised Ragen of the risks of continuing to use tobacco, and I provided her with tobacco cessation educational materials in the After Visit Summary.     During this visit, I spent 5 minutes counseling the patient regarding tobacco cessation. She has not smoked in 1 month! We discussed that HPV persistence is made more likely with smoking.       Plan:  Repeat pap/HPV in 1 year.     Brenda Almazan MD   12/16/2019  10:14 AM

## 2019-12-17 ENCOUNTER — OFFICE VISIT (OUTPATIENT)
Dept: SURGERY | Facility: CLINIC | Age: 44
End: 2019-12-17

## 2019-12-17 VITALS
TEMPERATURE: 98.3 F | BODY MASS INDEX: 22.52 KG/M2 | RESPIRATION RATE: 16 BRPM | OXYGEN SATURATION: 99 % | WEIGHT: 131.9 LBS | HEART RATE: 81 BPM | SYSTOLIC BLOOD PRESSURE: 130 MMHG | HEIGHT: 64 IN | DIASTOLIC BLOOD PRESSURE: 92 MMHG

## 2019-12-17 DIAGNOSIS — K64.4 ANAL SKIN TAG: Primary | ICD-10-CM

## 2019-12-17 PROCEDURE — 46600 DIAGNOSTIC ANOSCOPY SPX: CPT | Performed by: COLON & RECTAL SURGERY

## 2019-12-17 PROCEDURE — 99244 OFF/OP CNSLTJ NEW/EST MOD 40: CPT | Performed by: COLON & RECTAL SURGERY

## 2019-12-17 RX ORDER — HYDROCORTISONE ACETATE 25 MG/1
25 SUPPOSITORY RECTAL EVERY 12 HOURS
Qty: 14 EACH | Refills: 1 | Status: SHIPPED | OUTPATIENT
Start: 2019-12-17 | End: 2019-12-24

## 2019-12-17 NOTE — PROGRESS NOTES
Ruth Casillas is a 44 y.o. female who is seen as a consult at the request of Brenda Almazan* for Rectal Pain and Hemorrhoids.      HPI:    Pt states she first started having anorectal irritation over a year ago.  Has not gone away  A year ago, she had an episode of pain with bleeding, which resolved  She feels extra tissue that won't go away.  No pain currently.  Would get irritated if she had diarrhea, which would occur with stress    She usually has 1-2 BMs per day  Stool consistency [place] 4  She does not take any fiber supplements or stool softeners    She has tried otc PrepH, which has not helped    She is on Adderall.  No change in her BMs with this    She is s/p colposcopy yesterday  She had hysterectomy for fibroids and painful periods     No hx anorectal surgery    She has not had a colonoscopy    FamHx: no known hx colon polyps or colon cancer    Past Medical History:   Diagnosis Date   • ADHD 2016    predom inattentive type. [facility].   • Allergic rhinitis    • Anemia    • Anxiety    • ASCUS with positive high risk HPV cervical 2019   • Bloating    • Chronic nonintractable headache    • Depression    • Dysmenorrhea    • Dyspareunia, female    • Fatigue    • Menorrhagia    • Migraines    • Rectal bleeding 2018   • Vaginal atrophy    • Vaginal laceration 2019    ADMITTED TO [facility]   • Vasovagal syncope 2016       Past Surgical History:   Procedure Laterality Date   •  SECTION N/A 2000    DR. ELIJAH HERNANDEZ AT [place]   •  SECTION N/A 2003    DR. ELIJAH HERNANDEZ AT [place]   • COLPOSCOPY  2019    [facility] Procedure.   • DIAGNOSTIC LAPAROSCOPY N/A 2019    Procedure: DIAGNOSTIC LAPAROSCOPY;  Surgeon: Brenda Almazan MD;  Location: [facility] OR;  Service: Obstetrics/Gynecology   • EXAM UNDER ANESTHESIA N/A 2019    Procedure: EXAM UNDER ANESTHESIA;  Surgeon: Brenda Almazan MD;  Location: [facility] OR;   Service: Obstetrics/Gynecology   • HYSTERECTOMY N/A 12/01/2015    TLH/BS with OC, DR. DENISE RODAS AT [facility]   • TUBAL ABDOMINAL LIGATION Bilateral        Social History:   reports that she has quit smoking. Her smoking use included cigarettes. She smoked 0.50 packs per day. She has never used smokeless tobacco. She reports that she drinks about 2.0 standard drinks of alcohol per week. She reports that she does not use drugs.      Marriage status: Single    Family History   Problem Relation Age of Onset   • Immunodeficiency Mother    • Severe combined immunodeficiency Mother    • Emphysema Father    • Depression Father    • Anxiety disorder Father    • Depression Sister    • Anxiety disorder Sister    • Depression Maternal Grandfather    • Anxiety disorder Maternal Grandfather    • Depression Daughter    • Breast cancer Neg Hx    • Ovarian cancer Neg Hx    • Colon cancer Neg Hx    • Deep vein thrombosis Neg Hx    • Pulmonary embolism Neg Hx          Current Outpatient Medications:   •  amphetamine-dextroamphetamine (ADDERALL) 20 MG tablet, Take 1 tablet by mouth 2 (Two) Times a Day., Disp: 60 tablet, Rfl: 0  •  buPROPion XL (WELLBUTRIN XL) 150 MG 24 hr tablet, TAKE ONE TABLET BY MOUTH EVERY MORNING, Disp: 30 tablet, Rfl: 0  •  estradiol (ESTRACE) 0.1 MG/GM vaginal cream, 1/2 gram per vagina nightly for 1 month, Disp: 45 g, Rfl: 6  •  topiramate (TOPAMAX) 25 MG tablet, Take 1 tablet by mouth 2 (Two) Times a Day., Disp: 180 tablet, Rfl: 1    Allergy  Patient has no known allergies.    Review of Systems   Constitution: Negative for decreased appetite and weight gain.   HENT: Negative for congestion, hearing loss and hoarse voice.    Eyes: Negative for blurred vision, discharge and visual disturbance.   Cardiovascular: Negative for chest pain, cyanosis and leg swelling.   Respiratory: Negative for cough, shortness of breath, sleep disturbances due to breathing and snoring.    Endocrine: Negative for cold intolerance and  heat intolerance.   Hematologic/Lymphatic: Does not bruise/bleed easily.   Skin: Negative for itching, poor wound healing and skin cancer.   Musculoskeletal: Negative for arthritis, back pain, joint pain and joint swelling.   Gastrointestinal: Negative for abdominal pain, change in bowel habit, bowel incontinence and constipation.   Genitourinary: Negative for bladder incontinence, dysuria and hematuria.   Neurological: Negative for brief paralysis, excessive daytime sleepiness, dizziness, focal weakness, headaches, light-headedness and weakness.   Psychiatric/Behavioral: Negative for altered mental status and hallucinations. The patient does not have insomnia.    Allergic/Immunologic: Negative for HIV exposure and persistent infections.   All other systems reviewed and are negative.      Vitals:    12/17/19 1024   BP: 130/92   Pulse: 81   Resp: 16   Temp: 98.3 °F (36.8 °C)   SpO2: 99%     Body mass index is 22.63 kg/m².    Physical Exam   Constitutional: She is oriented to person, place, and time. She appears well-developed and well-nourished. No distress.   HENT:   Head: Normocephalic and atraumatic.   Nose: Nose normal.   Mouth/Throat: Oropharynx is clear and moist.   Eyes: Pupils are equal, round, and reactive to light. Conjunctivae and EOM are normal.   Neck: Normal range of motion. No tracheal deviation present.   Pulmonary/Chest: Effort normal and breath sounds normal. No respiratory distress.   Abdominal: Soft. She exhibits no distension.   Genitourinary:   Genitourinary Comments: Perianal exam: external hem - wnl.  Minor anterior tag  EARLINE- good tone, no masses  Anoscopy performed: wnl internal hem   Musculoskeletal: Normal range of motion. She exhibits no edema or deformity.   Neurological: She is alert and oriented to person, place, and time. No cranial nerve deficit. Coordination and gait normal.   Skin: Skin is warm and dry.   Psychiatric: She has a normal mood and affect. Her behavior is normal.  Judgment normal.     Review of Medical Record:  I reviewed Dr. Almazan record: pt c/o bothersome hemorrhoid/pain    Assessment:  1. Anal skin tag        Plan:    Discussion with pt her hemorrhoids look normal on exam today.  For occasional hemorrhoid issues, rx anusol cream.  Regarding minor skin tag, would not recommend excision, as scar tissue would be larger than what she currently has.  She can apply otc barrier cream prn.    As she is 44, recommend screening colonoscopy.  However, her insurance will not cover, so will place cy recall for age 49.    RTC prn      Scribed for Sherine Chiu MD by Alecia Garay PA-C  12/17/2019   This patient was evaluated by me, recommendations made, documentation reviewed, edited, and revised by me, Sherine Chiu MD

## 2019-12-20 ENCOUNTER — TELEPHONE (OUTPATIENT)
Dept: INTERNAL MEDICINE | Facility: CLINIC | Age: 44
End: 2019-12-20

## 2019-12-20 DIAGNOSIS — F41.9 ANXIETY: ICD-10-CM

## 2019-12-20 RX ORDER — BUPROPION HYDROCHLORIDE 300 MG/1
300 TABLET ORAL DAILY
Qty: 90 TABLET | Refills: 0 | Status: SHIPPED | OUTPATIENT
Start: 2019-12-20 | End: 2020-03-18 | Stop reason: SDUPTHER

## 2019-12-20 NOTE — TELEPHONE ENCOUNTER
----- Message from Fidelia Mendez MA sent at 12/19/2019  5:58 PM EST -----  Regarding: FW: Prescription Question  Contact: [phone]      ----- Message -----  From: Ruth Casillas  Sent: 12/19/2019   3:29 PM EST  To: Isma Pagan [facility] Clinical Pool  Subject: Prescription Question                            Can you possibly increase my Wellbutrin dosage? It's 150 mg per day and is doing wonderful with helping with no smoking but not so much for depression.   Thank you.

## 2019-12-23 DIAGNOSIS — F90.2 ATTENTION DEFICIT HYPERACTIVITY DISORDER (ADHD), COMBINED TYPE: ICD-10-CM

## 2019-12-26 RX ORDER — DEXTROAMPHETAMINE SACCHARATE, AMPHETAMINE ASPARTATE, DEXTROAMPHETAMINE SULFATE AND AMPHETAMINE SULFATE 5; 5; 5; 5 MG/1; MG/1; MG/1; MG/1
20 TABLET ORAL
Qty: 60 TABLET | Refills: 0 | Status: SHIPPED | OUTPATIENT
Start: 2019-12-26 | End: 2020-01-31 | Stop reason: SDUPTHER

## 2020-01-02 ENCOUNTER — TELEPHONE (OUTPATIENT)
Dept: SURGERY | Facility: CLINIC | Age: 45
End: 2020-01-02

## 2020-01-02 NOTE — TELEPHONE ENCOUNTER
Done.    ----- Message from Alecia Garay PA-C sent at 12/17/2019 10:58 AM EST -----  Regarding: cy recall  Zachary Soliman,    Can we please place cy recall for age 49?    Thanks!

## 2020-01-31 DIAGNOSIS — F90.2 ATTENTION DEFICIT HYPERACTIVITY DISORDER (ADHD), COMBINED TYPE: ICD-10-CM

## 2020-02-03 DIAGNOSIS — F90.2 ATTENTION DEFICIT HYPERACTIVITY DISORDER (ADHD), COMBINED TYPE: ICD-10-CM

## 2020-02-04 RX ORDER — DEXTROAMPHETAMINE SACCHARATE, AMPHETAMINE ASPARTATE, DEXTROAMPHETAMINE SULFATE AND AMPHETAMINE SULFATE 5; 5; 5; 5 MG/1; MG/1; MG/1; MG/1
20 TABLET ORAL
Qty: 60 TABLET | Refills: 0 | OUTPATIENT
Start: 2020-02-04

## 2020-02-04 RX ORDER — DEXTROAMPHETAMINE SACCHARATE, AMPHETAMINE ASPARTATE, DEXTROAMPHETAMINE SULFATE AND AMPHETAMINE SULFATE 5; 5; 5; 5 MG/1; MG/1; MG/1; MG/1
20 TABLET ORAL
Qty: 60 TABLET | Refills: 0 | Status: SHIPPED | OUTPATIENT
Start: 2020-02-04 | End: 2020-03-10 | Stop reason: SDUPTHER

## 2020-03-10 DIAGNOSIS — F90.2 ATTENTION DEFICIT HYPERACTIVITY DISORDER (ADHD), COMBINED TYPE: ICD-10-CM

## 2020-03-10 RX ORDER — DEXTROAMPHETAMINE SACCHARATE, AMPHETAMINE ASPARTATE, DEXTROAMPHETAMINE SULFATE AND AMPHETAMINE SULFATE 5; 5; 5; 5 MG/1; MG/1; MG/1; MG/1
20 TABLET ORAL
Qty: 60 TABLET | Refills: 0 | Status: SHIPPED | OUTPATIENT
Start: 2020-03-10 | End: 2020-04-13 | Stop reason: SDUPTHER

## 2020-03-18 ENCOUNTER — OFFICE VISIT (OUTPATIENT)
Dept: INTERNAL MEDICINE | Facility: CLINIC | Age: 45
End: 2020-03-18

## 2020-03-18 VITALS
SYSTOLIC BLOOD PRESSURE: 116 MMHG | HEART RATE: 86 BPM | WEIGHT: 129.6 LBS | DIASTOLIC BLOOD PRESSURE: 72 MMHG | TEMPERATURE: 98.1 F | OXYGEN SATURATION: 96 % | HEIGHT: 64 IN | RESPIRATION RATE: 16 BRPM | BODY MASS INDEX: 22.13 KG/M2

## 2020-03-18 DIAGNOSIS — F90.2 ATTENTION DEFICIT HYPERACTIVITY DISORDER (ADHD), COMBINED TYPE: Primary | ICD-10-CM

## 2020-03-18 DIAGNOSIS — F41.9 ANXIETY: ICD-10-CM

## 2020-03-18 PROCEDURE — 99214 OFFICE O/P EST MOD 30 MIN: CPT | Performed by: INTERNAL MEDICINE

## 2020-03-18 RX ORDER — FLUOXETINE HYDROCHLORIDE 20 MG/1
20 CAPSULE ORAL DAILY
Qty: 90 CAPSULE | Refills: 1 | Status: SHIPPED | OUTPATIENT
Start: 2020-03-18 | End: 2021-05-24 | Stop reason: SDUPTHER

## 2020-03-18 RX ORDER — BUPROPION HYDROCHLORIDE 150 MG/1
150 TABLET ORAL DAILY
Qty: 90 TABLET | Refills: 1 | Status: SHIPPED | OUTPATIENT
Start: 2020-03-18 | End: 2021-05-21

## 2020-03-18 NOTE — PROGRESS NOTES
Ruth Casillas is a 44 y.o. female, who presents with a chief complaint of   Chief Complaint   Patient presents with   • Medication Check       HPI   The pt is here for follow up.       ADHD - pt on adderall.  She has done well on meds.  She struggles with taking meds regularly.     anxiety - pt was on prozac and doing well but had low libido.  She is on higher Wellbutrin.  He helps her not smoke but it doesn't work as well as the prozac.  her depression is a little worse and a little more anxiety on wellbutrin alone.      She has had to switch jobs bc the [employer] in [facility].  She is now working in [place]    Her 17 yo son was diagnosed with epilepsy and they have had to go to lots of dr wolfe.  he is struggling in school and doesn't want to take his meds.  He also has adhd.       The following portions of the patient's history were reviewed and updated as appropriate: allergies, current medications, past family history, past medical history, past social history, past surgical history and problem list.    Allergies: Patient has no known allergies.    Review of Systems   Constitutional: Negative.    HENT: Negative.    Eyes: Negative.    Respiratory: Negative.    Cardiovascular: Negative.    Gastrointestinal: Negative.    Endocrine: Negative.    Genitourinary: Negative.    Musculoskeletal: Negative.    Skin: Negative.    Allergic/Immunologic: Negative.    Neurological: Negative.    Hematological: Negative.    Psychiatric/Behavioral: The patient is nervous/anxious.    All other systems reviewed and are negative.            Wt Readings from Last 3 Encounters:   03/18/20 58.8 kg (129 lb 9.6 oz)   12/17/19 59.8 kg (131 lb 14.4 oz)   12/16/19 57.7 kg (127 lb 3.2 oz)     Temp Readings from Last 3 Encounters:   03/18/20 98.1 °F (36.7 °C) (Oral)   12/17/19 98.3 °F (36.8 °C) (Oral)   07/17/19 98 °F (36.7 °C) (Oral)     BP Readings from Last 3 Encounters:   03/18/20 116/72   12/17/19 130/92   12/16/19  110/74     Pulse Readings from Last 3 Encounters:   03/18/20 86   12/17/19 81   11/04/19 101     Body mass index is 22.23 kg/m².  @LASTSAO2(3)@    Physical Exam   Constitutional: She is oriented to person, place, and time. She appears well-developed and well-nourished. No distress.   HENT:   Head: Normocephalic and atraumatic.   Right Ear: External ear normal.   Left Ear: External ear normal.   Nose: Nose normal.   Mouth/Throat: Oropharynx is clear and moist.   Eyes: Pupils are equal, round, and reactive to light. Conjunctivae and EOM are normal.   Neck: Normal range of motion. Neck supple.   Cardiovascular: Normal rate, regular rhythm, normal heart sounds and intact distal pulses.   Pulmonary/Chest: Effort normal and breath sounds normal. No respiratory distress. She has no wheezes.   Musculoskeletal: Normal range of motion.   Normal gait   Neurological: She is alert and oriented to person, place, and time.   Skin: Skin is warm and dry.   Psychiatric: She has a normal mood and affect. Her behavior is normal. Judgment and thought content normal.   Nursing note and vitals reviewed.      Results for orders placed or performed in visit on 12/16/19   POC Urinalysis Dipstick   Result Value Ref Range    Color Yellow Yellow, Straw, Dark Yellow, Dana    Clarity, UA Clear Clear    Glucose, UA Negative Negative, 1000 mg/dL (3+) mg/dL    Bilirubin Negative Negative    Ketones, UA Negative Negative    Specific Gravity  1.005 1.005 - 1.030    Blood, UA Negative Negative    pH, Urine 5.0 5.0 - 8.0    Protein, POC Negative Negative mg/dL    Urobilinogen, UA Normal Normal    Leukocytes Negative Negative    Nitrite, UA Negative Negative           Ragen was seen today for medication check.    Diagnoses and all orders for this visit:    Attention deficit hyperactivity disorder (ADHD), combined type - cont addderall    Anxiety  -     buPROPion XL (WELLBUTRIN XL) 150 MG 24 hr tablet; Take 1 tablet by mouth Daily.  -     FLUoxetine  (PROzac) 20 MG capsule; Take 1 capsule by mouth Daily.          Outpatient Medications Prior to Visit   Medication Sig Dispense Refill   • amphetamine-dextroamphetamine (ADDERALL) 20 MG tablet Take 1 tablet by mouth 2 (Two) Times a Day. 60 tablet 0   • estradiol (ESTRACE) 0.1 MG/GM vaginal cream 1/2 gram per vagina nightly for 1 month 45 g 6   • topiramate (TOPAMAX) 25 MG tablet Take 1 tablet by mouth 2 (Two) Times a Day. 180 tablet 1   • buPROPion XL (WELLBUTRIN XL) 300 MG 24 hr tablet Take 1 tablet by mouth Daily. 90 tablet 0     No facility-administered medications prior to visit.      New Medications Ordered This Visit   Medications   • buPROPion XL (WELLBUTRIN XL) 150 MG 24 hr tablet     Sig: Take 1 tablet by mouth Daily.     Dispense:  90 tablet     Refill:  1   • FLUoxetine (PROzac) 20 MG capsule     Sig: Take 1 capsule by mouth Daily.     Dispense:  90 capsule     Refill:  1     [unfilled]  Medications Discontinued During This Encounter   Medication Reason   • buPROPion XL (WELLBUTRIN XL) 300 MG 24 hr tablet Reorder         Return in about 3 months (around 6/18/2020) for Recheck, labs.

## 2020-03-23 RX ORDER — BUPROPION HYDROCHLORIDE 300 MG/1
TABLET ORAL
Qty: 90 TABLET | Refills: 0 | OUTPATIENT
Start: 2020-03-23

## 2020-04-13 DIAGNOSIS — F90.2 ATTENTION DEFICIT HYPERACTIVITY DISORDER (ADHD), COMBINED TYPE: ICD-10-CM

## 2020-04-13 DIAGNOSIS — R51.9 CHRONIC NONINTRACTABLE HEADACHE, UNSPECIFIED HEADACHE TYPE: ICD-10-CM

## 2020-04-13 DIAGNOSIS — G89.29 CHRONIC NONINTRACTABLE HEADACHE, UNSPECIFIED HEADACHE TYPE: ICD-10-CM

## 2020-04-13 RX ORDER — TOPIRAMATE 25 MG/1
25 TABLET ORAL 2 TIMES DAILY
Qty: 180 TABLET | Refills: 1 | Status: SHIPPED | OUTPATIENT
Start: 2020-04-13 | End: 2020-09-07 | Stop reason: SDUPTHER

## 2020-04-13 RX ORDER — DEXTROAMPHETAMINE SACCHARATE, AMPHETAMINE ASPARTATE, DEXTROAMPHETAMINE SULFATE AND AMPHETAMINE SULFATE 5; 5; 5; 5 MG/1; MG/1; MG/1; MG/1
20 TABLET ORAL
Qty: 60 TABLET | Refills: 0 | Status: SHIPPED | OUTPATIENT
Start: 2020-04-13 | End: 2020-05-16 | Stop reason: SDUPTHER

## 2020-05-16 DIAGNOSIS — F90.2 ATTENTION DEFICIT HYPERACTIVITY DISORDER (ADHD), COMBINED TYPE: ICD-10-CM

## 2020-05-19 RX ORDER — DEXTROAMPHETAMINE SACCHARATE, AMPHETAMINE ASPARTATE, DEXTROAMPHETAMINE SULFATE AND AMPHETAMINE SULFATE 5; 5; 5; 5 MG/1; MG/1; MG/1; MG/1
20 TABLET ORAL
Qty: 60 TABLET | Refills: 0 | Status: SHIPPED | OUTPATIENT
Start: 2020-05-19 | End: 2020-06-23 | Stop reason: SDUPTHER

## 2020-06-23 DIAGNOSIS — F90.2 ATTENTION DEFICIT HYPERACTIVITY DISORDER (ADHD), COMBINED TYPE: ICD-10-CM

## 2020-06-29 RX ORDER — DEXTROAMPHETAMINE SACCHARATE, AMPHETAMINE ASPARTATE, DEXTROAMPHETAMINE SULFATE AND AMPHETAMINE SULFATE 5; 5; 5; 5 MG/1; MG/1; MG/1; MG/1
20 TABLET ORAL
Qty: 60 TABLET | Refills: 0 | Status: SHIPPED | OUTPATIENT
Start: 2020-06-29 | End: 2020-07-28 | Stop reason: SDUPTHER

## 2020-07-28 DIAGNOSIS — F90.2 ATTENTION DEFICIT HYPERACTIVITY DISORDER (ADHD), COMBINED TYPE: ICD-10-CM

## 2020-07-29 RX ORDER — DEXTROAMPHETAMINE SACCHARATE, AMPHETAMINE ASPARTATE, DEXTROAMPHETAMINE SULFATE AND AMPHETAMINE SULFATE 5; 5; 5; 5 MG/1; MG/1; MG/1; MG/1
20 TABLET ORAL
Qty: 60 TABLET | Refills: 0 | Status: SHIPPED | OUTPATIENT
Start: 2020-07-29 | End: 2020-09-07 | Stop reason: SDUPTHER

## 2020-09-07 DIAGNOSIS — G89.29 CHRONIC NONINTRACTABLE HEADACHE, UNSPECIFIED HEADACHE TYPE: ICD-10-CM

## 2020-09-07 DIAGNOSIS — R51.9 CHRONIC NONINTRACTABLE HEADACHE, UNSPECIFIED HEADACHE TYPE: ICD-10-CM

## 2020-09-07 DIAGNOSIS — F90.2 ATTENTION DEFICIT HYPERACTIVITY DISORDER (ADHD), COMBINED TYPE: ICD-10-CM

## 2020-09-09 RX ORDER — DEXTROAMPHETAMINE SACCHARATE, AMPHETAMINE ASPARTATE, DEXTROAMPHETAMINE SULFATE AND AMPHETAMINE SULFATE 5; 5; 5; 5 MG/1; MG/1; MG/1; MG/1
20 TABLET ORAL
Qty: 60 TABLET | Refills: 0 | Status: SHIPPED | OUTPATIENT
Start: 2020-09-09 | End: 2020-10-12 | Stop reason: SDUPTHER

## 2020-09-09 RX ORDER — TOPIRAMATE 25 MG/1
25 TABLET ORAL 2 TIMES DAILY
Qty: 180 TABLET | Refills: 1 | Status: SHIPPED | OUTPATIENT
Start: 2020-09-09 | End: 2021-01-27 | Stop reason: SDUPTHER

## 2020-10-12 DIAGNOSIS — F90.2 ATTENTION DEFICIT HYPERACTIVITY DISORDER (ADHD), COMBINED TYPE: ICD-10-CM

## 2020-10-13 RX ORDER — DEXTROAMPHETAMINE SACCHARATE, AMPHETAMINE ASPARTATE, DEXTROAMPHETAMINE SULFATE AND AMPHETAMINE SULFATE 5; 5; 5; 5 MG/1; MG/1; MG/1; MG/1
20 TABLET ORAL
Qty: 60 TABLET | Refills: 0 | Status: SHIPPED | OUTPATIENT
Start: 2020-10-13 | End: 2020-11-19 | Stop reason: SDUPTHER

## 2020-11-16 DIAGNOSIS — F90.2 ATTENTION DEFICIT HYPERACTIVITY DISORDER (ADHD), COMBINED TYPE: ICD-10-CM

## 2020-11-16 RX ORDER — DEXTROAMPHETAMINE SACCHARATE, AMPHETAMINE ASPARTATE, DEXTROAMPHETAMINE SULFATE AND AMPHETAMINE SULFATE 5; 5; 5; 5 MG/1; MG/1; MG/1; MG/1
20 TABLET ORAL
Qty: 60 TABLET | Refills: 0 | OUTPATIENT
Start: 2020-11-16

## 2020-11-19 DIAGNOSIS — F90.2 ATTENTION DEFICIT HYPERACTIVITY DISORDER (ADHD), COMBINED TYPE: ICD-10-CM

## 2020-11-20 RX ORDER — DEXTROAMPHETAMINE SACCHARATE, AMPHETAMINE ASPARTATE, DEXTROAMPHETAMINE SULFATE AND AMPHETAMINE SULFATE 5; 5; 5; 5 MG/1; MG/1; MG/1; MG/1
20 TABLET ORAL
Qty: 60 TABLET | Refills: 0 | Status: SHIPPED | OUTPATIENT
Start: 2020-11-20 | End: 2020-12-23 | Stop reason: SDUPTHER

## 2020-12-23 DIAGNOSIS — F90.2 ATTENTION DEFICIT HYPERACTIVITY DISORDER (ADHD), COMBINED TYPE: ICD-10-CM

## 2020-12-23 RX ORDER — DEXTROAMPHETAMINE SACCHARATE, AMPHETAMINE ASPARTATE, DEXTROAMPHETAMINE SULFATE AND AMPHETAMINE SULFATE 5; 5; 5; 5 MG/1; MG/1; MG/1; MG/1
20 TABLET ORAL
Qty: 60 TABLET | Refills: 0 | Status: SHIPPED | OUTPATIENT
Start: 2020-12-23 | End: 2021-01-27 | Stop reason: SDUPTHER

## 2021-01-27 DIAGNOSIS — G89.29 CHRONIC NONINTRACTABLE HEADACHE, UNSPECIFIED HEADACHE TYPE: ICD-10-CM

## 2021-01-27 DIAGNOSIS — R51.9 CHRONIC NONINTRACTABLE HEADACHE, UNSPECIFIED HEADACHE TYPE: ICD-10-CM

## 2021-01-27 DIAGNOSIS — F90.2 ATTENTION DEFICIT HYPERACTIVITY DISORDER (ADHD), COMBINED TYPE: ICD-10-CM

## 2021-01-27 RX ORDER — DEXTROAMPHETAMINE SACCHARATE, AMPHETAMINE ASPARTATE, DEXTROAMPHETAMINE SULFATE AND AMPHETAMINE SULFATE 5; 5; 5; 5 MG/1; MG/1; MG/1; MG/1
20 TABLET ORAL
Qty: 60 TABLET | Refills: 0 | Status: SHIPPED | OUTPATIENT
Start: 2021-01-27 | End: 2021-03-04 | Stop reason: SDUPTHER

## 2021-01-27 RX ORDER — TOPIRAMATE 25 MG/1
25 TABLET ORAL 2 TIMES DAILY
Qty: 180 TABLET | Refills: 1 | Status: SHIPPED | OUTPATIENT
Start: 2021-01-27 | End: 2021-05-21 | Stop reason: SDUPTHER

## 2021-02-04 RX ORDER — ESTRADIOL 0.1 MG/G
CREAM VAGINAL
Qty: 42.5 EACH | Refills: 0 | Status: SHIPPED | OUTPATIENT
Start: 2021-02-04 | End: 2021-02-07 | Stop reason: SDUPTHER

## 2021-02-08 RX ORDER — ESTRADIOL 0.1 MG/G
CREAM VAGINAL
Qty: 42.5 G | Refills: 0 | Status: SHIPPED | OUTPATIENT
Start: 2021-02-08 | End: 2023-03-30

## 2021-03-04 DIAGNOSIS — F90.2 ATTENTION DEFICIT HYPERACTIVITY DISORDER (ADHD), COMBINED TYPE: ICD-10-CM

## 2021-03-05 RX ORDER — DEXTROAMPHETAMINE SACCHARATE, AMPHETAMINE ASPARTATE, DEXTROAMPHETAMINE SULFATE AND AMPHETAMINE SULFATE 5; 5; 5; 5 MG/1; MG/1; MG/1; MG/1
20 TABLET ORAL
Qty: 60 TABLET | Refills: 0 | Status: SHIPPED | OUTPATIENT
Start: 2021-03-05 | End: 2021-04-16 | Stop reason: SDUPTHER

## 2021-04-16 DIAGNOSIS — F90.2 ATTENTION DEFICIT HYPERACTIVITY DISORDER (ADHD), COMBINED TYPE: ICD-10-CM

## 2021-04-17 RX ORDER — ESTRADIOL 0.1 MG/G
CREAM VAGINAL
Qty: 42.5 G | Refills: 0 | OUTPATIENT
Start: 2021-04-17

## 2021-04-19 RX ORDER — DEXTROAMPHETAMINE SACCHARATE, AMPHETAMINE ASPARTATE, DEXTROAMPHETAMINE SULFATE AND AMPHETAMINE SULFATE 5; 5; 5; 5 MG/1; MG/1; MG/1; MG/1
20 TABLET ORAL
Qty: 60 TABLET | Refills: 0 | Status: SHIPPED | OUTPATIENT
Start: 2021-04-19 | End: 2021-05-21 | Stop reason: SDUPTHER

## 2021-05-21 ENCOUNTER — OFFICE VISIT (OUTPATIENT)
Dept: INTERNAL MEDICINE | Facility: CLINIC | Age: 46
End: 2021-05-21

## 2021-05-21 VITALS
HEIGHT: 64 IN | HEART RATE: 96 BPM | RESPIRATION RATE: 16 BRPM | WEIGHT: 133 LBS | DIASTOLIC BLOOD PRESSURE: 82 MMHG | SYSTOLIC BLOOD PRESSURE: 124 MMHG | TEMPERATURE: 96.9 F | OXYGEN SATURATION: 99 % | BODY MASS INDEX: 22.71 KG/M2

## 2021-05-21 DIAGNOSIS — Z00.00 HEALTHCARE MAINTENANCE: Primary | ICD-10-CM

## 2021-05-21 DIAGNOSIS — G89.29 CHRONIC NONINTRACTABLE HEADACHE, UNSPECIFIED HEADACHE TYPE: ICD-10-CM

## 2021-05-21 DIAGNOSIS — F90.2 ATTENTION DEFICIT HYPERACTIVITY DISORDER (ADHD), COMBINED TYPE: ICD-10-CM

## 2021-05-21 DIAGNOSIS — R51.9 CHRONIC NONINTRACTABLE HEADACHE, UNSPECIFIED HEADACHE TYPE: ICD-10-CM

## 2021-05-21 DIAGNOSIS — Z12.9 CANCER SCREENING: ICD-10-CM

## 2021-05-21 DIAGNOSIS — F41.9 ANXIETY: ICD-10-CM

## 2021-05-21 PROCEDURE — 99396 PREV VISIT EST AGE 40-64: CPT | Performed by: INTERNAL MEDICINE

## 2021-05-21 PROCEDURE — 99213 OFFICE O/P EST LOW 20 MIN: CPT | Performed by: INTERNAL MEDICINE

## 2021-05-21 RX ORDER — TOPIRAMATE 25 MG/1
25 TABLET ORAL 2 TIMES DAILY
Qty: 180 TABLET | Refills: 1 | Status: SHIPPED | OUTPATIENT
Start: 2021-05-21 | End: 2021-05-21 | Stop reason: SDUPTHER

## 2021-05-21 RX ORDER — DEXTROAMPHETAMINE SACCHARATE, AMPHETAMINE ASPARTATE, DEXTROAMPHETAMINE SULFATE AND AMPHETAMINE SULFATE 5; 5; 5; 5 MG/1; MG/1; MG/1; MG/1
20 TABLET ORAL
Qty: 60 TABLET | Refills: 0 | Status: SHIPPED | OUTPATIENT
Start: 2021-05-21 | End: 2021-05-21 | Stop reason: SDUPTHER

## 2021-05-21 RX ORDER — FLUOXETINE HYDROCHLORIDE 20 MG/1
20 CAPSULE ORAL DAILY
Qty: 90 CAPSULE | Refills: 1 | Status: CANCELLED | OUTPATIENT
Start: 2021-05-21

## 2021-05-22 LAB
ALBUMIN SERPL-MCNC: 4.8 G/DL (ref 3.5–5.2)
ALBUMIN/GLOB SERPL: 2 G/DL
ALP SERPL-CCNC: 70 U/L (ref 39–117)
ALT SERPL-CCNC: 17 U/L (ref 1–33)
AST SERPL-CCNC: 22 U/L (ref 1–32)
BASOPHILS # BLD AUTO: 0.09 10*3/MM3 (ref 0–0.2)
BASOPHILS NFR BLD AUTO: 1.2 % (ref 0–1.5)
BILIRUB SERPL-MCNC: 0.2 MG/DL (ref 0–1.2)
BUN SERPL-MCNC: 15 MG/DL (ref 6–20)
BUN/CREAT SERPL: 18.8 (ref 7–25)
CALCIUM SERPL-MCNC: 10 MG/DL (ref 8.6–10.5)
CHLORIDE SERPL-SCNC: 104 MMOL/L (ref 98–107)
CHOLEST SERPL-MCNC: 142 MG/DL (ref 0–200)
CO2 SERPL-SCNC: 24.4 MMOL/L (ref 22–29)
CREAT SERPL-MCNC: 0.8 MG/DL (ref 0.57–1)
EOSINOPHIL # BLD AUTO: 0.42 10*3/MM3 (ref 0–0.4)
EOSINOPHIL NFR BLD AUTO: 5.4 % (ref 0.3–6.2)
ERYTHROCYTE [DISTWIDTH] IN BLOOD BY AUTOMATED COUNT: 12.1 % (ref 12.3–15.4)
GLOBULIN SER CALC-MCNC: 2.4 GM/DL
GLUCOSE SERPL-MCNC: 85 MG/DL (ref 65–99)
HBA1C MFR BLD: 5.5 % (ref 4.8–5.6)
HCT VFR BLD AUTO: 41.7 % (ref 34–46.6)
HDLC SERPL-MCNC: 73 MG/DL (ref 40–60)
HGB BLD-MCNC: 13.9 G/DL (ref 12–15.9)
IMM GRANULOCYTES # BLD AUTO: 0.02 10*3/MM3 (ref 0–0.05)
IMM GRANULOCYTES NFR BLD AUTO: 0.3 % (ref 0–0.5)
LDLC SERPL CALC-MCNC: 58 MG/DL (ref 0–100)
LDLC/HDLC SERPL: 0.81 {RATIO}
LYMPHOCYTES # BLD AUTO: 1.63 10*3/MM3 (ref 0.7–3.1)
LYMPHOCYTES NFR BLD AUTO: 20.9 % (ref 19.6–45.3)
MCH RBC QN AUTO: 30.2 PG (ref 26.6–33)
MCHC RBC AUTO-ENTMCNC: 33.3 G/DL (ref 31.5–35.7)
MCV RBC AUTO: 90.7 FL (ref 79–97)
MONOCYTES # BLD AUTO: 0.34 10*3/MM3 (ref 0.1–0.9)
MONOCYTES NFR BLD AUTO: 4.4 % (ref 5–12)
NEUTROPHILS # BLD AUTO: 5.29 10*3/MM3 (ref 1.7–7)
NEUTROPHILS NFR BLD AUTO: 67.8 % (ref 42.7–76)
NRBC BLD AUTO-RTO: 0 /100 WBC (ref 0–0.2)
PLATELET # BLD AUTO: 335 10*3/MM3 (ref 140–450)
POTASSIUM SERPL-SCNC: 5 MMOL/L (ref 3.5–5.2)
PROT SERPL-MCNC: 7.2 G/DL (ref 6–8.5)
RBC # BLD AUTO: 4.6 10*6/MM3 (ref 3.77–5.28)
SODIUM SERPL-SCNC: 140 MMOL/L (ref 136–145)
T4 FREE SERPL-MCNC: 1.04 NG/DL (ref 0.93–1.7)
TRIGL SERPL-MCNC: 50 MG/DL (ref 0–150)
TSH SERPL DL<=0.005 MIU/L-ACNC: 1.51 UIU/ML (ref 0.27–4.2)
VLDLC SERPL CALC-MCNC: 11 MG/DL (ref 5–40)
WBC # BLD AUTO: 7.79 10*3/MM3 (ref 3.4–10.8)

## 2021-05-24 DIAGNOSIS — F41.9 ANXIETY: ICD-10-CM

## 2021-05-24 RX ORDER — FLUOXETINE HYDROCHLORIDE 20 MG/1
20 CAPSULE ORAL DAILY
Qty: 90 CAPSULE | Refills: 1 | Status: SHIPPED | OUTPATIENT
Start: 2021-05-24 | End: 2021-11-04 | Stop reason: SDUPTHER

## 2021-05-24 RX ORDER — DEXTROAMPHETAMINE SACCHARATE, AMPHETAMINE ASPARTATE, DEXTROAMPHETAMINE SULFATE AND AMPHETAMINE SULFATE 5; 5; 5; 5 MG/1; MG/1; MG/1; MG/1
20 TABLET ORAL
Qty: 60 TABLET | Refills: 0 | Status: SHIPPED | OUTPATIENT
Start: 2021-05-24 | End: 2021-06-28 | Stop reason: SDUPTHER

## 2021-05-24 RX ORDER — TOPIRAMATE 25 MG/1
25 TABLET ORAL 2 TIMES DAILY
Qty: 180 TABLET | Refills: 1 | Status: SHIPPED | OUTPATIENT
Start: 2021-05-24 | End: 2022-03-31 | Stop reason: SDUPTHER

## 2021-05-27 ENCOUNTER — TELEPHONE (OUTPATIENT)
Dept: INTERNAL MEDICINE | Facility: CLINIC | Age: 46
End: 2021-05-27

## 2021-06-28 DIAGNOSIS — F90.2 ATTENTION DEFICIT HYPERACTIVITY DISORDER (ADHD), COMBINED TYPE: ICD-10-CM

## 2021-06-28 RX ORDER — DEXTROAMPHETAMINE SACCHARATE, AMPHETAMINE ASPARTATE, DEXTROAMPHETAMINE SULFATE AND AMPHETAMINE SULFATE 5; 5; 5; 5 MG/1; MG/1; MG/1; MG/1
20 TABLET ORAL
Qty: 60 TABLET | Refills: 0 | Status: SHIPPED | OUTPATIENT
Start: 2021-06-28 | End: 2021-08-04 | Stop reason: SDUPTHER

## 2021-08-04 DIAGNOSIS — F90.2 ATTENTION DEFICIT HYPERACTIVITY DISORDER (ADHD), COMBINED TYPE: ICD-10-CM

## 2021-08-04 RX ORDER — DEXTROAMPHETAMINE SACCHARATE, AMPHETAMINE ASPARTATE, DEXTROAMPHETAMINE SULFATE AND AMPHETAMINE SULFATE 5; 5; 5; 5 MG/1; MG/1; MG/1; MG/1
20 TABLET ORAL
Qty: 60 TABLET | Refills: 0 | Status: SHIPPED | OUTPATIENT
Start: 2021-08-04 | End: 2021-09-12 | Stop reason: SDUPTHER

## 2021-09-12 DIAGNOSIS — F90.2 ATTENTION DEFICIT HYPERACTIVITY DISORDER (ADHD), COMBINED TYPE: ICD-10-CM

## 2021-09-12 NOTE — TELEPHONE ENCOUNTER
Rx Refill Note  Requested Prescriptions     Pending Prescriptions Disp Refills    amphetamine-dextroamphetamine (ADDERALL) 20 MG tablet 60 tablet 0     Sig: Take 1 tablet by mouth 2 (Two) Times a Day.      Last office visit with prescribing clinician: 5/21/2021      Next office visit with prescribing clinician: Visit date not found ( Sent patient a message to schedule a appointment. )    Heaven Mock  09/12/21, 13:06 EDT

## 2021-09-13 RX ORDER — DEXTROAMPHETAMINE SACCHARATE, AMPHETAMINE ASPARTATE, DEXTROAMPHETAMINE SULFATE AND AMPHETAMINE SULFATE 5; 5; 5; 5 MG/1; MG/1; MG/1; MG/1
20 TABLET ORAL
Qty: 60 TABLET | Refills: 0 | Status: SHIPPED | OUTPATIENT
Start: 2021-09-13 | End: 2021-11-04 | Stop reason: SDUPTHER

## 2021-11-04 DIAGNOSIS — F90.2 ATTENTION DEFICIT HYPERACTIVITY DISORDER (ADHD), COMBINED TYPE: ICD-10-CM

## 2021-11-04 DIAGNOSIS — F41.9 ANXIETY: ICD-10-CM

## 2021-11-04 RX ORDER — FLUOXETINE HYDROCHLORIDE 20 MG/1
20 CAPSULE ORAL DAILY
Qty: 90 CAPSULE | Refills: 1 | Status: SHIPPED | OUTPATIENT
Start: 2021-11-04 | End: 2021-11-09

## 2021-11-06 RX ORDER — DEXTROAMPHETAMINE SACCHARATE, AMPHETAMINE ASPARTATE, DEXTROAMPHETAMINE SULFATE AND AMPHETAMINE SULFATE 5; 5; 5; 5 MG/1; MG/1; MG/1; MG/1
20 TABLET ORAL
Qty: 60 TABLET | Refills: 0 | Status: SHIPPED | OUTPATIENT
Start: 2021-11-06 | End: 2021-12-08 | Stop reason: SDUPTHER

## 2021-11-09 ENCOUNTER — OFFICE VISIT (OUTPATIENT)
Dept: INTERNAL MEDICINE | Facility: CLINIC | Age: 46
End: 2021-11-09

## 2021-11-09 VITALS
BODY MASS INDEX: 22.43 KG/M2 | HEIGHT: 64 IN | HEART RATE: 86 BPM | TEMPERATURE: 97.7 F | RESPIRATION RATE: 20 BRPM | SYSTOLIC BLOOD PRESSURE: 112 MMHG | DIASTOLIC BLOOD PRESSURE: 50 MMHG | WEIGHT: 131.4 LBS | OXYGEN SATURATION: 98 %

## 2021-11-09 DIAGNOSIS — Z71.6 ENCOUNTER FOR SMOKING CESSATION COUNSELING: ICD-10-CM

## 2021-11-09 DIAGNOSIS — F41.9 ANXIETY: ICD-10-CM

## 2021-11-09 DIAGNOSIS — F90.2 ATTENTION DEFICIT HYPERACTIVITY DISORDER (ADHD), COMBINED TYPE: Primary | ICD-10-CM

## 2021-11-09 PROCEDURE — 99214 OFFICE O/P EST MOD 30 MIN: CPT | Performed by: INTERNAL MEDICINE

## 2021-11-09 RX ORDER — BUPROPION HYDROCHLORIDE 150 MG/1
150 TABLET ORAL DAILY
Qty: 90 TABLET | Refills: 1 | Status: SHIPPED | OUTPATIENT
Start: 2021-11-09 | End: 2022-04-22 | Stop reason: SDUPTHER

## 2021-11-09 NOTE — ASSESSMENT & PLAN NOTE
Discontinue Prozac. Continue taking Wellbutrin daily.  If symptoms worsen, may restart Prozac. Will reassess at next office visit.

## 2021-11-09 NOTE — PROGRESS NOTES
"Chief Complaint  Med Refill    Subjective          Ruth Casillas presents to [facility]  History of Present Illness     45 year old female here for follow-up on ADHD. She is on Adderall 20 mg BID and takes it in the morning and in the afternoon. She may sometimes take only one pill on the weekend.     States that she has been taking Wellbutrin for smoking cessation and nicotine cravings. She is interested in stopping her Prozac and only using Wellbutrin to cut back on the number of pills she is taking every day.    She received a Pfizer COVID vaccine on 9/10/21 and 10/13/21. She will get a flu vaccine at her work. She wants to defer colon cancer screening at this time, but is interested in discussing a colonoscopy vs. cologuard at her next office visit.    Objective   Vital Signs:   /50   Pulse 86   Temp 97.7 °F (36.5 °C) (Temporal)   Resp 20   Ht 162.6 cm (64\")   Wt 59.6 kg (131 lb 6.4 oz)   SpO2 98%   BMI 22.55 kg/m²     Physical Exam  Vitals and nursing note reviewed.   Constitutional:       General: She is not in acute distress.     Appearance: Normal appearance. She is normal weight.   HENT:      Head: Normocephalic and atraumatic.      Right Ear: Tympanic membrane, ear canal and external ear normal.      Left Ear: Tympanic membrane, ear canal and external ear normal.      Nose: Nose normal.      Mouth/Throat:      Mouth: Mucous membranes are moist.      Pharynx: No oropharyngeal exudate.   Eyes:      Pupils: Pupils are equal, round, and reactive to light.   Cardiovascular:      Rate and Rhythm: Normal rate and regular rhythm.      Pulses: Normal pulses.      Heart sounds: Normal heart sounds. No murmur heard.  No friction rub. No gallop.    Pulmonary:      Effort: Pulmonary effort is normal. No respiratory distress.      Breath sounds: Normal breath sounds. No wheezing or rales.   Abdominal:      General: Abdomen is flat. Bowel sounds are normal. There is no " distension.      Palpations: Abdomen is soft.      Tenderness: There is no abdominal tenderness.   Musculoskeletal:         General: Normal range of motion.      Cervical back: Normal range of motion.   Lymphadenopathy:      Cervical: No cervical adenopathy.   Skin:     General: Skin is warm.      Capillary Refill: Capillary refill takes less than 2 seconds.   Neurological:      General: No focal deficit present.      Mental Status: She is alert and oriented to person, place, and time. Mental status is at baseline.   Psychiatric:         Mood and Affect: Mood normal.         Behavior: Behavior normal.         Thought Content: Thought content normal.         Judgment: Judgment normal.        Result Review :                 Assessment and Plan    Diagnoses and all orders for this visit:    1. Attention deficit hyperactivity disorder (ADHD), combined type (Primary) - cont adderall  Assessment & Plan:  Psychological condition is unchanged.  Continue current treatment regimen.  Psychological condition  will be reassessed in 3 months.      2. Encounter for smoking cessation counseling  Assessment & Plan:  Start Wellbutrin daily.      3. Anxiety  Assessment & Plan:  Discontinue Prozac. Continue taking Wellbutrin daily.  If symptoms worsen, may restart Prozac. Will reassess at next office visit.    Orders:  -     buPROPion XL (Wellbutrin XL) 150 MG 24 hr tablet; Take 1 tablet by mouth Daily.  Dispense: 90 tablet; Refill: 1      Follow Up   Return in about 3 months (around 2/9/2022) for Recheck.  Patient was given instructions and counseling regarding her condition or for health maintenance advice. Please see specific information pulled into the AVS if appropriate.     Isaac Negrete MD  Internal Medicine/Pediatrics, PGY-1      I have seen and examined the patient independently.  Agree with above.

## 2021-12-08 DIAGNOSIS — F90.2 ATTENTION DEFICIT HYPERACTIVITY DISORDER (ADHD), COMBINED TYPE: ICD-10-CM

## 2021-12-09 RX ORDER — DEXTROAMPHETAMINE SACCHARATE, AMPHETAMINE ASPARTATE, DEXTROAMPHETAMINE SULFATE AND AMPHETAMINE SULFATE 5; 5; 5; 5 MG/1; MG/1; MG/1; MG/1
20 TABLET ORAL
Qty: 60 TABLET | Refills: 0 | Status: SHIPPED | OUTPATIENT
Start: 2021-12-09 | End: 2022-02-07 | Stop reason: SDUPTHER

## 2021-12-09 NOTE — TELEPHONE ENCOUNTER
Rx Refill Note  Requested Prescriptions     Pending Prescriptions Disp Refills    amphetamine-dextroamphetamine (ADDERALL) 20 MG tablet 60 tablet 0     Sig: Take 1 tablet by mouth 2 (Two) Times a Day.      Last office visit with prescribing clinician: 11/9/2021      Next office visit with prescribing clinician: Visit date not found            Rissa Lucero MA  12/09/21, 08:04 EST

## 2022-02-07 DIAGNOSIS — F90.2 ATTENTION DEFICIT HYPERACTIVITY DISORDER (ADHD), COMBINED TYPE: ICD-10-CM

## 2022-02-07 RX ORDER — DEXTROAMPHETAMINE SACCHARATE, AMPHETAMINE ASPARTATE, DEXTROAMPHETAMINE SULFATE AND AMPHETAMINE SULFATE 5; 5; 5; 5 MG/1; MG/1; MG/1; MG/1
20 TABLET ORAL
Qty: 60 TABLET | Refills: 0 | Status: SHIPPED | OUTPATIENT
Start: 2022-02-07 | End: 2022-04-22 | Stop reason: SDUPTHER

## 2022-03-31 DIAGNOSIS — R51.9 CHRONIC NONINTRACTABLE HEADACHE, UNSPECIFIED HEADACHE TYPE: ICD-10-CM

## 2022-03-31 DIAGNOSIS — G89.29 CHRONIC NONINTRACTABLE HEADACHE, UNSPECIFIED HEADACHE TYPE: ICD-10-CM

## 2022-03-31 RX ORDER — TOPIRAMATE 25 MG/1
25 TABLET ORAL 2 TIMES DAILY
Qty: 180 TABLET | Refills: 1 | Status: SHIPPED | OUTPATIENT
Start: 2022-03-31 | End: 2023-03-30 | Stop reason: SDUPTHER

## 2022-04-11 DIAGNOSIS — F90.2 ATTENTION DEFICIT HYPERACTIVITY DISORDER (ADHD), COMBINED TYPE: ICD-10-CM

## 2022-04-11 RX ORDER — DEXTROAMPHETAMINE SACCHARATE, AMPHETAMINE ASPARTATE, DEXTROAMPHETAMINE SULFATE AND AMPHETAMINE SULFATE 5; 5; 5; 5 MG/1; MG/1; MG/1; MG/1
20 TABLET ORAL
Qty: 60 TABLET | Refills: 0 | OUTPATIENT
Start: 2022-04-11

## 2022-04-14 DIAGNOSIS — F90.2 ATTENTION DEFICIT HYPERACTIVITY DISORDER (ADHD), COMBINED TYPE: ICD-10-CM

## 2022-04-14 RX ORDER — DEXTROAMPHETAMINE SACCHARATE, AMPHETAMINE ASPARTATE, DEXTROAMPHETAMINE SULFATE AND AMPHETAMINE SULFATE 5; 5; 5; 5 MG/1; MG/1; MG/1; MG/1
20 TABLET ORAL
Qty: 60 TABLET | Refills: 0 | Status: CANCELLED | OUTPATIENT
Start: 2022-04-14

## 2022-04-14 NOTE — TELEPHONE ENCOUNTER
Caller: Ruth Casillas    Relationship: Self    Best call back number: [phone]     Requested Prescriptions:   Requested Prescriptions     Pending Prescriptions Disp Refills   • amphetamine-dextroamphetamine (ADDERALL) 20 MG tablet 60 tablet 0     Sig: Take 1 tablet by mouth 2 (Two) Times a Day.        Pharmacy where request should be sent: [facility]     Additional details provided by patient: PATIENT SCHEDULED FOR 04/22/22    Does the patient have less than a 3 day supply:  [x] Yes  [] No    Maria Rod Rep   04/14/22 10:23 EDT

## 2022-04-22 ENCOUNTER — TELEMEDICINE (OUTPATIENT)
Dept: INTERNAL MEDICINE | Facility: CLINIC | Age: 47
End: 2022-04-22

## 2022-04-22 DIAGNOSIS — F90.2 ATTENTION DEFICIT HYPERACTIVITY DISORDER (ADHD), COMBINED TYPE: Primary | ICD-10-CM

## 2022-04-22 DIAGNOSIS — F41.9 ANXIETY: ICD-10-CM

## 2022-04-22 PROCEDURE — 99214 OFFICE O/P EST MOD 30 MIN: CPT | Performed by: INTERNAL MEDICINE

## 2022-04-22 RX ORDER — DEXTROAMPHETAMINE SACCHARATE, AMPHETAMINE ASPARTATE, DEXTROAMPHETAMINE SULFATE AND AMPHETAMINE SULFATE 5; 5; 5; 5 MG/1; MG/1; MG/1; MG/1
20 TABLET ORAL
Qty: 60 TABLET | Refills: 0 | Status: SHIPPED | OUTPATIENT
Start: 2022-04-22 | End: 2022-05-31 | Stop reason: SDUPTHER

## 2022-04-22 RX ORDER — BUPROPION HYDROCHLORIDE 300 MG/1
300 TABLET ORAL DAILY
Qty: 90 TABLET | Refills: 1 | Status: SHIPPED | OUTPATIENT
Start: 2022-04-22 | End: 2023-03-30

## 2022-04-22 NOTE — PROGRESS NOTES
Ruth Casillas is a 46 y.o. female, who presents with a chief complaint of No chief complaint on file.          HPI   This visit has been scheduled as a telehealth visit to comply with patient safety concerns in accordance with CDC recommendations. This was an audio and video enabled telemedicine encounter.    You have chosen to receive care through a televisit visit. Do you consent to use a televisit visit for your medical care today? Yes    45 year old female here for follow-up on ADHD. She is on Adderall 20 mg BID and takes it in the morning and in the afternoon. She may sometimes take only one pill on the weekend.      She has been taking Wellbutrin for smoking cessation and nicotine cravings. She had also been on prozac for anxiety.  She stopped the prozac but feels like she needs a higher dose of wellbutrin.  She hasn't been smoking bc she just doesn't want them while on wellbutrin.        The following portions of the patient's history were reviewed and updated as appropriate: allergies, current medications, past family history, past medical history, past social history, past surgical history and problem list.    Allergies: Patient has no known allergies.    Review of Systems   Constitutional: Negative.    HENT: Negative.    Eyes: Negative.    Respiratory: Negative.    Cardiovascular: Negative.    Gastrointestinal: Negative.    Endocrine: Negative.    Genitourinary: Negative.    Musculoskeletal: Negative.    Skin: Negative.    Allergic/Immunologic: Negative.    Neurological: Negative.    Hematological: Negative.    Psychiatric/Behavioral: Negative.    All other systems reviewed and are negative.            Wt Readings from Last 3 Encounters:   01/03/22 59 kg (130 lb)   11/09/21 59.6 kg (131 lb 6.4 oz)   05/21/21 60.3 kg (133 lb)     Temp Readings from Last 3 Encounters:   01/03/22 98 °F (36.7 °C) (Temporal)   11/09/21 97.7 °F (36.5 °C) (Temporal)   05/21/21 96.9 °F (36.1 °C) (Temporal)     BP Readings  from Last 3 Encounters:   01/03/22 129/70   11/09/21 112/50   05/21/21 124/82     Pulse Readings from Last 3 Encounters:   01/03/22 75   11/09/21 86   05/21/21 96     There is no height or weight on file to calculate BMI.  SpO2 Readings from Last 3 Encounters:   01/03/22 99%   11/09/21 98%   05/21/21 99%          Physical Exam  Vitals and nursing note reviewed.   Constitutional:       Appearance: She is well-developed.   HENT:      Head: Normocephalic and atraumatic.      Nose: Nose normal.   Eyes:      General:         Right eye: No discharge.         Left eye: No discharge.      Conjunctiva/sclera: Conjunctivae normal.   Pulmonary:      Effort: Pulmonary effort is normal. No respiratory distress.   Musculoskeletal:      Cervical back: Normal range of motion and neck supple.   Skin:     Findings: No rash.   Neurological:      Mental Status: She is alert and oriented to person, place, and time.   Psychiatric:         Behavior: Behavior normal.         Thought Content: Thought content normal.         Judgment: Judgment normal.         Results for orders placed or performed in visit on 02/08/22   Hepatitis B Surface Antibody    Specimen: Blood   Result Value Ref Range    Hep B S Ab Non-Reactive    Varicella Zoster Antibody, IgG    Specimen: Blood   Result Value Ref Range    Varicella IgG 1815 Immune >165 index     Result Review :                  Assessment and Plan    Diagnoses and all orders for this visit:    1. Attention deficit hyperactivity disorder (ADHD), combined type (Primary)  -     amphetamine-dextroamphetamine (ADDERALL) 20 MG tablet; Take 1 tablet by mouth 2 (Two) Times a Day.  Dispense: 60 tablet; Refill: 0    2. Anxiety  -     buPROPion XL (Wellbutrin XL) 300 MG 24 hr tablet; Take 1 tablet by mouth Daily.  Dispense: 90 tablet; Refill: 1                 Outpatient Medications Prior to Visit   Medication Sig Dispense Refill   • estradiol (ESTRACE) 0.1 MG/GM vaginal cream INSERT 1/2 GRAM PER VAGINA  NIGHTLY FOR ONE MONTH 42.5 g 0   • topiramate (Topamax) 25 MG tablet Take 1 tablet by mouth 2 (Two) Times a Day. 180 tablet 1   • amphetamine-dextroamphetamine (ADDERALL) 20 MG tablet Take 1 tablet by mouth 2 (Two) Times a Day. 60 tablet 0   • azithromycin (Zithromax) 250 MG tablet Take 2 tablets the first day, then 1 tablet daily for 4 days. 6 tablet 0   • buPROPion XL (Wellbutrin XL) 150 MG 24 hr tablet Take 1 tablet by mouth Daily. 90 tablet 1   • cetirizine (zyrTEC) 10 MG tablet Take 1 tablet by mouth Daily for 30 days. 30 tablet 0     No facility-administered medications prior to visit.     New Medications Ordered This Visit   Medications   • buPROPion XL (Wellbutrin XL) 300 MG 24 hr tablet     Sig: Take 1 tablet by mouth Daily.     Dispense:  90 tablet     Refill:  1   • amphetamine-dextroamphetamine (ADDERALL) 20 MG tablet     Sig: Take 1 tablet by mouth 2 (Two) Times a Day.     Dispense:  60 tablet     Refill:  0     [unfilled]  Medications Discontinued During This Encounter   Medication Reason   • azithromycin (Zithromax) 250 MG tablet    • cetirizine (zyrTEC) 10 MG tablet    • buPROPion XL (Wellbutrin XL) 150 MG 24 hr tablet Reorder   • amphetamine-dextroamphetamine (ADDERALL) 20 MG tablet Reorder         Return in about 3 months (around 7/22/2022) for Recheck.    Patient was given instructions and counseling regarding her condition or for health maintenance advice. Please see specific information pulled into the AVS if appropriate.

## 2022-04-25 ENCOUNTER — TELEPHONE (OUTPATIENT)
Dept: INTERNAL MEDICINE | Facility: CLINIC | Age: 47
End: 2022-04-25

## 2022-04-25 NOTE — TELEPHONE ENCOUNTER
Caller: Ruth Casillas    Relationship: Self    Best call back number: [phone] (H)    What test/procedure requested: PRIOR AUTHORIZATION    When is it needed: ASAP    Where is the test/procedure going to be performed:      Additional information or concerns: PATIENT CALLED TO REQUEST A PRIOR AUTHORIZATION BE SENT TO HER PHARMACY FOR HER amphetamine-dextroamphetamine (ADDERALL) 20 MG tablet.        THANKS

## 2022-04-25 NOTE — TELEPHONE ENCOUNTER
Pharmacy Name:  [facility].     Pharmacy representative name: [facility]     Pharmacy representative phone number: [phone]    What medication are you calling in regards to: GENERIC ADDERALL    What question does the pharmacy have:  [facility] NEEDS TO KNOW THE ICD 10 CODE, OR PATIENT'S DIAGNOSIS.     Who is the provider that prescribed the medication: DR. CORONA    Additional notes: PLEASE CALL [facility] TO CLARIFY PATIENT'S DIAGNOSIS.

## 2022-05-31 DIAGNOSIS — F90.2 ATTENTION DEFICIT HYPERACTIVITY DISORDER (ADHD), COMBINED TYPE: ICD-10-CM

## 2022-06-01 NOTE — TELEPHONE ENCOUNTER
Rx Refill Note  Requested Prescriptions     Pending Prescriptions Disp Refills    amphetamine-dextroamphetamine (ADDERALL) 20 MG tablet 60 tablet 0     Sig: Take 1 tablet by mouth 2 (Two) Times a Day.      Last office visit with prescribing clinician: 11/9/2021      Next office visit with prescribing clinician: Visit date not found            JAROD SALCEDO MA  06/01/22, 15:26 EDT

## 2022-06-02 RX ORDER — DEXTROAMPHETAMINE SACCHARATE, AMPHETAMINE ASPARTATE, DEXTROAMPHETAMINE SULFATE AND AMPHETAMINE SULFATE 5; 5; 5; 5 MG/1; MG/1; MG/1; MG/1
20 TABLET ORAL
Qty: 60 TABLET | Refills: 0 | Status: SHIPPED | OUTPATIENT
Start: 2022-06-02 | End: 2022-07-06 | Stop reason: SDUPTHER

## 2022-07-06 DIAGNOSIS — F90.2 ATTENTION DEFICIT HYPERACTIVITY DISORDER (ADHD), COMBINED TYPE: ICD-10-CM

## 2022-07-06 RX ORDER — DEXTROAMPHETAMINE SACCHARATE, AMPHETAMINE ASPARTATE, DEXTROAMPHETAMINE SULFATE AND AMPHETAMINE SULFATE 5; 5; 5; 5 MG/1; MG/1; MG/1; MG/1
20 TABLET ORAL
Qty: 60 TABLET | Refills: 0 | Status: SHIPPED | OUTPATIENT
Start: 2022-07-06 | End: 2022-08-10 | Stop reason: SDUPTHER

## 2022-07-06 NOTE — TELEPHONE ENCOUNTER
Rx Refill Note  Requested Prescriptions     Pending Prescriptions Disp Refills    amphetamine-dextroamphetamine (ADDERALL) 20 MG tablet 60 tablet 0     Sig: Take 1 tablet by mouth 2 (Two) Times a Day.      Last office visit with prescribing clinician: Visit date not found      Next office visit with prescribing clinician: Visit date not found            JAROD SALCEDO MA  07/06/22, 16:54 EDT

## 2022-08-10 DIAGNOSIS — F90.2 ATTENTION DEFICIT HYPERACTIVITY DISORDER (ADHD), COMBINED TYPE: ICD-10-CM

## 2022-08-10 RX ORDER — DEXTROAMPHETAMINE SACCHARATE, AMPHETAMINE ASPARTATE, DEXTROAMPHETAMINE SULFATE AND AMPHETAMINE SULFATE 5; 5; 5; 5 MG/1; MG/1; MG/1; MG/1
20 TABLET ORAL
Qty: 60 TABLET | Refills: 0 | Status: SHIPPED | OUTPATIENT
Start: 2022-08-10 | End: 2022-09-16 | Stop reason: SDUPTHER

## 2022-08-10 NOTE — TELEPHONE ENCOUNTER
Rx Refill Note  Requested Prescriptions     Pending Prescriptions Disp Refills   • amphetamine-dextroamphetamine (ADDERALL) 20 MG tablet 60 tablet 0     Sig: Take 1 tablet by mouth 2 (Two) Times a Day.      Last office visit with prescribing clinician: 11/9/2021      Next office visit with prescribing clinician: Visit date not found            Rosa M Crisostomo MA  08/10/22, 13:25 EDT

## 2022-09-16 DIAGNOSIS — F90.2 ATTENTION DEFICIT HYPERACTIVITY DISORDER (ADHD), COMBINED TYPE: ICD-10-CM

## 2022-09-16 NOTE — TELEPHONE ENCOUNTER
Rx Refill Note  Requested Prescriptions     Pending Prescriptions Disp Refills    amphetamine-dextroamphetamine (ADDERALL) 20 MG tablet 60 tablet 0     Sig: Take 1 tablet by mouth 2 (Two) Times a Day.      Last office visit with prescribing clinician: 11/9/2021    ( had a telehealth on 4/22/22)  Next office visit with prescribing clinician: Visit date not found            JAROD SALCEDO MA  09/16/22, 11:49 EDT

## 2022-09-19 RX ORDER — DEXTROAMPHETAMINE SACCHARATE, AMPHETAMINE ASPARTATE, DEXTROAMPHETAMINE SULFATE AND AMPHETAMINE SULFATE 5; 5; 5; 5 MG/1; MG/1; MG/1; MG/1
20 TABLET ORAL
Qty: 60 TABLET | Refills: 0 | Status: SHIPPED | OUTPATIENT
Start: 2022-09-19 | End: 2022-10-25 | Stop reason: SDUPTHER

## 2022-10-05 ENCOUNTER — OFFICE VISIT (OUTPATIENT)
Dept: INTERNAL MEDICINE | Facility: CLINIC | Age: 47
End: 2022-10-05

## 2022-10-05 VITALS
HEART RATE: 77 BPM | DIASTOLIC BLOOD PRESSURE: 90 MMHG | BODY MASS INDEX: 22.77 KG/M2 | TEMPERATURE: 97.8 F | SYSTOLIC BLOOD PRESSURE: 120 MMHG | HEIGHT: 64 IN | WEIGHT: 133.4 LBS | OXYGEN SATURATION: 99 %

## 2022-10-05 DIAGNOSIS — Z00.00 HEALTHCARE MAINTENANCE: Primary | ICD-10-CM

## 2022-10-05 DIAGNOSIS — Z12.11 COLON CANCER SCREENING: ICD-10-CM

## 2022-10-05 DIAGNOSIS — Z12.31 BREAST CANCER SCREENING BY MAMMOGRAM: ICD-10-CM

## 2022-10-05 DIAGNOSIS — F90.2 ATTENTION DEFICIT HYPERACTIVITY DISORDER (ADHD), COMBINED TYPE: ICD-10-CM

## 2022-10-05 DIAGNOSIS — F41.9 ANXIETY: ICD-10-CM

## 2022-10-05 PROCEDURE — 99396 PREV VISIT EST AGE 40-64: CPT | Performed by: INTERNAL MEDICINE

## 2022-10-05 NOTE — PROGRESS NOTES
Chief Complaint   Patient presents with   • ADHD     Med Check   • Annual Exam       Patient Name: Ruth Miranda is a 46 y.o. female presenting for ADHD (Med Check) and Annual Exam    Ruth Casillas 46 y.o. female who presents for an Annual Wellness Visit.  she has a history of   Patient Active Problem List   Diagnosis   • Anxiety   • Attention deficit hyperactivity disorder (ADHD), combined type   • Smoker   • Diarrhea   • Rectal bleeding   • Vaginal laceration, subsequent encounter   • Atypical squamous cell changes of undetermined significance (ASCUS) on vaginal cytology with positive high risk human papilloma virus (HPV)   • Encounter for smoking cessation counseling   .  she has been doing well with new interval problems.      Health Habits:  Dental Exam. up to date  Eye Exam. up to date  Exercise: 2 times/week.  Current exercise activities include: walking    Menstrual history: s/p hysterectomy for fibroids/bleeding  Last pap date: 11/21/19  Abnormal pap? Yes - ascus  Mammogram: due  Dexa:n/a  Colonoscopy:due  Tob use:no quit 2021, doing well with wellbutrin  EtOH use: no  Qualifies for lung Ca screening?no    ADHD - pt on adderall.  She has been doing well with this medication.  Weight stable.  Sx well controlled.     Anxiety - doing well on wellbutrin.     The following portions of the patient's history were reviewed and updated as appropriate: allergies, current medications, past family history, past medical history, past social history, past surgical history and problem list.    Review of Systems   Constitutional: Negative.    HENT: Negative.    Eyes: Negative.    Respiratory: Negative.    Cardiovascular: Negative.    Gastrointestinal: Negative.    Endocrine: Negative.    Genitourinary: Negative.    Musculoskeletal: Negative.    Skin: Negative.    Allergic/Immunologic: Negative.    Neurological: Negative.    Hematological: Negative.    Psychiatric/Behavioral: Negative.    All  "other systems reviewed and are negative.      Patient has no known allergies.      Current Outpatient Medications:   •  amphetamine-dextroamphetamine (ADDERALL) 20 MG tablet, Take 1 tablet by mouth 2 (Two) Times a Day., Disp: 60 tablet, Rfl: 0  •  buPROPion XL (Wellbutrin XL) 300 MG 24 hr tablet, Take 1 tablet by mouth Daily., Disp: 90 tablet, Rfl: 1  •  topiramate (Topamax) 25 MG tablet, Take 1 tablet by mouth 2 (Two) Times a Day., Disp: 180 tablet, Rfl: 1  •  estradiol (ESTRACE) 0.1 MG/GM vaginal cream, INSERT 1/2 GRAM PER VAGINA NIGHTLY FOR ONE MONTH, Disp: 42.5 g, Rfl: 0    OBJECTIVE    /90 (BP Location: Left arm, Patient Position: Sitting, Cuff Size: Adult)   Pulse 77   Temp 97.8 °F (36.6 °C)   Ht 162.6 cm (64\")   Wt 60.5 kg (133 lb 6.4 oz)   SpO2 99%   BMI 22.90 kg/m²     Physical Exam  Vitals and nursing note reviewed.   Constitutional:       General: She is not in acute distress.     Appearance: She is well-developed.   HENT:      Head: Normocephalic and atraumatic.      Right Ear: External ear normal.      Left Ear: External ear normal.      Nose: Nose normal.   Eyes:      Conjunctiva/sclera: Conjunctivae normal.      Pupils: Pupils are equal, round, and reactive to light.   Cardiovascular:      Rate and Rhythm: Normal rate and regular rhythm.      Heart sounds: Normal heart sounds.   Pulmonary:      Effort: Pulmonary effort is normal. No respiratory distress.      Breath sounds: Normal breath sounds. No wheezing.   Musculoskeletal:         General: Normal range of motion.      Cervical back: Normal range of motion and neck supple.      Comments: Normal gait   Skin:     General: Skin is warm and dry.   Neurological:      Mental Status: She is alert and oriented to person, place, and time.   Psychiatric:         Behavior: Behavior normal.         Thought Content: Thought content normal.         Judgment: Judgment normal.           ASSESSMENT AND PLAN  Diagnoses and all orders for this " visit:    1. Healthcare maintenance (Primary)  -     Ambulatory Referral For Screening Colonoscopy  -     Mammo Screening Bilateral With CAD  -     Comprehensive Metabolic Panel  -     CBC & Differential  -     T4, Free  -     TSH  -     Lipid Panel With LDL / HDL Ratio  -     Hepatitis C Antibody    2. Attention deficit hyperactivity disorder (ADHD), combined type - cont adderall    3. Anxiety - cont wellbutrin  -     Comprehensive Metabolic Panel  -     CBC & Differential  -     T4, Free  -     TSH  -     Lipid Panel With LDL / HDL Ratio  -     Hepatitis C Antibody        Discussed healthy diet, exercise, cancer screening, immunizations, and preventive care.  Hm tab updated.  Encouraged seat belt use.  No texting while driving. Orders placed as below.     Encouraged covid vaccination if not already done.  Covid vaccination can be scheduled at [email or web address]    continue current medications, continue current healthy lifestyle patterns and return for routine annual checkups      Return in about 3 months (around 1/5/2023) for Recheck.

## 2022-10-06 LAB
ALBUMIN SERPL-MCNC: 4.9 G/DL (ref 3.5–5.2)
ALBUMIN/GLOB SERPL: 2.5 G/DL
ALP SERPL-CCNC: 90 U/L (ref 39–117)
ALT SERPL-CCNC: 20 U/L (ref 1–33)
AST SERPL-CCNC: 19 U/L (ref 1–32)
BASOPHILS # BLD AUTO: 0.08 10*3/MM3 (ref 0–0.2)
BASOPHILS NFR BLD AUTO: 1.8 % (ref 0–1.5)
BILIRUB SERPL-MCNC: 0.4 MG/DL (ref 0–1.2)
BUN SERPL-MCNC: 16 MG/DL (ref 6–20)
BUN/CREAT SERPL: 16.5 (ref 7–25)
CALCIUM SERPL-MCNC: 10.1 MG/DL (ref 8.6–10.5)
CHLORIDE SERPL-SCNC: 101 MMOL/L (ref 98–107)
CHOLEST SERPL-MCNC: 150 MG/DL (ref 0–200)
CO2 SERPL-SCNC: 24.5 MMOL/L (ref 22–29)
CREAT SERPL-MCNC: 0.97 MG/DL (ref 0.57–1)
EGFRCR SERPLBLD CKD-EPI 2021: 72.7 ML/MIN/1.73
EOSINOPHIL # BLD AUTO: 0.2 10*3/MM3 (ref 0–0.4)
EOSINOPHIL NFR BLD AUTO: 4.4 % (ref 0.3–6.2)
ERYTHROCYTE [DISTWIDTH] IN BLOOD BY AUTOMATED COUNT: 12 % (ref 12.3–15.4)
GLOBULIN SER CALC-MCNC: 2 GM/DL
GLUCOSE SERPL-MCNC: 90 MG/DL (ref 65–99)
HCT VFR BLD AUTO: 42.5 % (ref 34–46.6)
HCV AB S/CO SERPL IA: <0.1 S/CO RATIO (ref 0–0.9)
HDLC SERPL-MCNC: 66 MG/DL (ref 40–60)
HGB BLD-MCNC: 13.8 G/DL (ref 12–15.9)
IMM GRANULOCYTES # BLD AUTO: 0 10*3/MM3 (ref 0–0.05)
IMM GRANULOCYTES NFR BLD AUTO: 0 % (ref 0–0.5)
LDLC SERPL CALC-MCNC: 73 MG/DL (ref 0–100)
LDLC/HDLC SERPL: 1.13 {RATIO}
LYMPHOCYTES # BLD AUTO: 1.7 10*3/MM3 (ref 0.7–3.1)
LYMPHOCYTES NFR BLD AUTO: 37.3 % (ref 19.6–45.3)
MCH RBC QN AUTO: 29.7 PG (ref 26.6–33)
MCHC RBC AUTO-ENTMCNC: 32.5 G/DL (ref 31.5–35.7)
MCV RBC AUTO: 91.6 FL (ref 79–97)
MONOCYTES # BLD AUTO: 0.27 10*3/MM3 (ref 0.1–0.9)
MONOCYTES NFR BLD AUTO: 5.9 % (ref 5–12)
NEUTROPHILS # BLD AUTO: 2.31 10*3/MM3 (ref 1.7–7)
NEUTROPHILS NFR BLD AUTO: 50.6 % (ref 42.7–76)
NRBC BLD AUTO-RTO: 0 /100 WBC (ref 0–0.2)
PLATELET # BLD AUTO: 321 10*3/MM3 (ref 140–450)
POTASSIUM SERPL-SCNC: 4.9 MMOL/L (ref 3.5–5.2)
PROT SERPL-MCNC: 6.9 G/DL (ref 6–8.5)
RBC # BLD AUTO: 4.64 10*6/MM3 (ref 3.77–5.28)
SODIUM SERPL-SCNC: 137 MMOL/L (ref 136–145)
T4 FREE SERPL-MCNC: 1.29 NG/DL (ref 0.93–1.7)
TRIGL SERPL-MCNC: 48 MG/DL (ref 0–150)
TSH SERPL DL<=0.005 MIU/L-ACNC: 2.12 UIU/ML (ref 0.27–4.2)
VLDLC SERPL CALC-MCNC: 11 MG/DL (ref 5–40)
WBC # BLD AUTO: 4.56 10*3/MM3 (ref 3.4–10.8)

## 2022-10-07 ENCOUNTER — TELEPHONE (OUTPATIENT)
Dept: INTERNAL MEDICINE | Facility: CLINIC | Age: 47
End: 2022-10-07

## 2022-10-10 DIAGNOSIS — Z00.00 HEALTHCARE MAINTENANCE: Primary | ICD-10-CM

## 2022-10-10 DIAGNOSIS — Z12.31 BREAST CANCER SCREENING BY MAMMOGRAM: ICD-10-CM

## 2022-10-13 ENCOUNTER — HOSPITAL ENCOUNTER (OUTPATIENT)
Dept: MAMMOGRAPHY | Facility: HOSPITAL | Age: 47
Discharge: HOME | End: 2022-10-13
Admitting: INTERNAL MEDICINE

## 2022-10-13 DIAGNOSIS — Z12.31 BREAST CANCER SCREENING BY MAMMOGRAM: ICD-10-CM

## 2022-10-13 DIAGNOSIS — Z00.00 HEALTHCARE MAINTENANCE: ICD-10-CM

## 2022-10-13 PROCEDURE — 77063 BREAST TOMOSYNTHESIS BI: CPT

## 2022-10-13 PROCEDURE — 77067 SCR MAMMO BI INCL CAD: CPT

## 2022-10-25 DIAGNOSIS — F90.2 ATTENTION DEFICIT HYPERACTIVITY DISORDER (ADHD), COMBINED TYPE: ICD-10-CM

## 2022-10-25 RX ORDER — DEXTROAMPHETAMINE SACCHARATE, AMPHETAMINE ASPARTATE, DEXTROAMPHETAMINE SULFATE AND AMPHETAMINE SULFATE 5; 5; 5; 5 MG/1; MG/1; MG/1; MG/1
20 TABLET ORAL
Qty: 60 TABLET | Refills: 0 | Status: SHIPPED | OUTPATIENT
Start: 2022-10-25 | End: 2022-12-12 | Stop reason: SDUPTHER

## 2022-10-25 NOTE — TELEPHONE ENCOUNTER
Rx Refill Note  Requested Prescriptions     Pending Prescriptions Disp Refills    amphetamine-dextroamphetamine (ADDERALL) 20 MG tablet 60 tablet 0     Sig: Take 1 tablet by mouth 2 (Two) Times a Day.      Last office visit with prescribing clinician: 10/5/2022      Next office visit with prescribing clinician: Visit date not found            Corina Martínez MA  10/25/22, 16:06 EDT

## 2022-12-12 DIAGNOSIS — F90.2 ATTENTION DEFICIT HYPERACTIVITY DISORDER (ADHD), COMBINED TYPE: ICD-10-CM

## 2022-12-12 RX ORDER — DEXTROAMPHETAMINE SACCHARATE, AMPHETAMINE ASPARTATE, DEXTROAMPHETAMINE SULFATE AND AMPHETAMINE SULFATE 5; 5; 5; 5 MG/1; MG/1; MG/1; MG/1
20 TABLET ORAL
Qty: 60 TABLET | Refills: 0 | Status: SHIPPED | OUTPATIENT
Start: 2022-12-12 | End: 2023-01-16 | Stop reason: SDUPTHER

## 2022-12-12 NOTE — TELEPHONE ENCOUNTER
Rx Refill Note  Requested Prescriptions     Pending Prescriptions Disp Refills    amphetamine-dextroamphetamine (ADDERALL) 20 MG tablet 60 tablet 0     Sig: Take 1 tablet by mouth 2 (Two) Times a Day.      Last office visit with prescribing clinician: Visit date not found   Last telemedicine visit with prescribing clinician: Visit date not found   Next office visit with prescribing clinician: Visit date not found                         Would you like a call back once the refill request has been completed: [] Yes [] No    If the office needs to give you a call back, can they leave a voicemail: [] Yes [] No    Corina Martínez MA  12/12/22, 15:20 EST

## 2023-01-16 DIAGNOSIS — F90.2 ATTENTION DEFICIT HYPERACTIVITY DISORDER (ADHD), COMBINED TYPE: ICD-10-CM

## 2023-01-16 NOTE — TELEPHONE ENCOUNTER
Rx Refill Note  Requested Prescriptions     Pending Prescriptions Disp Refills    amphetamine-dextroamphetamine (ADDERALL) 20 MG tablet 60 tablet 0     Sig: Take 1 tablet by mouth 2 (Two) Times a Day.      Last office visit with prescribing clinician: 10/5/2022   Last telemedicine visit with prescribing clinician: Visit date not found   Next office visit with prescribing clinician: Visit date not found                         Would you like a call back once the refill request has been completed: [] Yes [] No    If the office needs to give you a call back, can they leave a voicemail: [] Yes [] No    Rosa M Crisostomo MA  01/16/23, 08:57 EST

## 2023-01-23 RX ORDER — DEXTROAMPHETAMINE SACCHARATE, AMPHETAMINE ASPARTATE, DEXTROAMPHETAMINE SULFATE AND AMPHETAMINE SULFATE 5; 5; 5; 5 MG/1; MG/1; MG/1; MG/1
20 TABLET ORAL
Qty: 60 TABLET | Refills: 0 | Status: SHIPPED | OUTPATIENT
Start: 2023-01-23 | End: 2023-03-30 | Stop reason: SDUPTHER

## 2023-03-30 ENCOUNTER — TELEMEDICINE (OUTPATIENT)
Dept: INTERNAL MEDICINE | Facility: CLINIC | Age: 48
End: 2023-03-30
Payer: COMMERCIAL

## 2023-03-30 DIAGNOSIS — G89.29 CHRONIC NONINTRACTABLE HEADACHE, UNSPECIFIED HEADACHE TYPE: ICD-10-CM

## 2023-03-30 DIAGNOSIS — F90.2 ATTENTION DEFICIT HYPERACTIVITY DISORDER (ADHD), COMBINED TYPE: ICD-10-CM

## 2023-03-30 DIAGNOSIS — R51.9 CHRONIC NONINTRACTABLE HEADACHE, UNSPECIFIED HEADACHE TYPE: ICD-10-CM

## 2023-03-30 PROCEDURE — 99214 OFFICE O/P EST MOD 30 MIN: CPT | Performed by: INTERNAL MEDICINE

## 2023-03-30 RX ORDER — DEXTROAMPHETAMINE SACCHARATE, AMPHETAMINE ASPARTATE, DEXTROAMPHETAMINE SULFATE AND AMPHETAMINE SULFATE 5; 5; 5; 5 MG/1; MG/1; MG/1; MG/1
20 TABLET ORAL
Qty: 60 TABLET | Refills: 0 | Status: SHIPPED | OUTPATIENT
Start: 2023-03-30

## 2023-03-30 RX ORDER — TOPIRAMATE 25 MG/1
25 TABLET ORAL 2 TIMES DAILY
Qty: 180 TABLET | Refills: 1 | Status: SHIPPED | OUTPATIENT
Start: 2023-03-30

## 2023-03-30 NOTE — PROGRESS NOTES
Ruth Casillas is a 47 y.o. female, who presents with a chief complaint of   Chief Complaint   Patient presents with   • ADD           HPI   This visit has been scheduled as a telehealth visit to comply with patient safety concerns in accordance with CDC recommendations. This was an audio and video enabled telemedicine encounter.    You have chosen to receive care through a televisit visit. Do you consent to use a televisit visit for your medical care today? Yes    Pt is located at home and I am in the office.     Since pt's last OV she had a skin lesion removed that was melanoma.  She has been referred to Dr. Marquez at the [facility] for wide excision.     Anxiety - pt stopped her wellbutrin.  She could tell a difference at first off the med but is fine now.     ADHD - doing well on adderall    She ran out of her topamax about a michelle ago and she has been waking up with headaches. She would like to restart topamax.       The following portions of the patient's history were reviewed and updated as appropriate: allergies, current medications, past family history, past medical history, past social history, past surgical history and problem list.    Allergies: Patient has no known allergies.    Review of Systems   Constitutional: Negative.    HENT: Negative.    Eyes: Negative.    Respiratory: Negative.    Cardiovascular: Negative.    Gastrointestinal: Negative.    Endocrine: Negative.    Genitourinary: Negative.    Musculoskeletal: Negative.    Skin: Negative.    Allergic/Immunologic: Negative.    Neurological: Negative.    Hematological: Negative.    Psychiatric/Behavioral: Negative.    All other systems reviewed and are negative.            Wt Readings from Last 3 Encounters:   10/05/22 60.5 kg (133 lb 6.4 oz)   01/03/22 59 kg (130 lb)   11/09/21 59.6 kg (131 lb 6.4 oz)     Temp Readings from Last 3 Encounters:   10/05/22 97.8 °F (36.6 °C)   01/03/22 98 °F (36.7 °C) (Temporal)   11/09/21 97.7 °F  (36.5 °C) (Temporal)     BP Readings from Last 3 Encounters:   10/05/22 120/90   01/03/22 129/70   11/09/21 112/50     Pulse Readings from Last 3 Encounters:   10/05/22 77   01/03/22 75   11/09/21 86     There is no height or weight on file to calculate BMI.  SpO2 Readings from Last 3 Encounters:   10/05/22 99%   01/03/22 99%   11/09/21 98%          Physical Exam  Vitals and nursing note reviewed.   Constitutional:       Appearance: She is well-developed.   HENT:      Head: Normocephalic and atraumatic.      Nose: Nose normal.   Eyes:      General:         Right eye: No discharge.         Left eye: No discharge.      Conjunctiva/sclera: Conjunctivae normal.   Pulmonary:      Effort: Pulmonary effort is normal. No respiratory distress.   Musculoskeletal:      Cervical back: Normal range of motion and neck supple.   Skin:     Findings: No rash.   Neurological:      General: No focal deficit present.      Mental Status: She is alert and oriented to person, place, and time.   Psychiatric:         Behavior: Behavior normal.         Thought Content: Thought content normal.         Judgment: Judgment normal.         Results for orders placed or performed in visit on 10/05/22   Comprehensive Metabolic Panel    Specimen: Blood   Result Value Ref Range    Glucose 90 65 - 99 mg/dL    BUN 16 6 - 20 mg/dL    Creatinine 0.97 0.57 - 1.00 mg/dL    EGFR Result 72.7 >60.0 mL/min/1.73    BUN/Creatinine Ratio 16.5 7.0 - 25.0    Sodium 137 136 - 145 mmol/L    Potassium 4.9 3.5 - 5.2 mmol/L    Chloride 101 98 - 107 mmol/L    Total CO2 24.5 22.0 - 29.0 mmol/L    Calcium 10.1 8.6 - 10.5 mg/dL    Total Protein 6.9 6.0 - 8.5 g/dL    Albumin 4.90 3.50 - 5.20 g/dL    Globulin 2.0 gm/dL    A/G Ratio 2.5 g/dL    Total Bilirubin 0.4 0.0 - 1.2 mg/dL    Alkaline Phosphatase 90 39 - 117 U/L    AST (SGOT) 19 1 - 32 U/L    ALT (SGPT) 20 1 - 33 U/L   T4, Free    Specimen: Blood   Result Value Ref Range    Free T4 1.29 0.93 - 1.70 ng/dL   TSH     Specimen: Blood   Result Value Ref Range    TSH 2.120 0.270 - 4.200 uIU/mL   Lipid Panel With LDL / HDL Ratio    Specimen: Blood   Result Value Ref Range    Total Cholesterol 150 0 - 200 mg/dL    Triglycerides 48 0 - 150 mg/dL    HDL Cholesterol 66 (H) 40 - 60 mg/dL    VLDL Cholesterol Ammon 11 5 - 40 mg/dL    LDL Chol Calc (NIH) 73 0 - 100 mg/dL    LDL/HDL RATIO 1.13    Hepatitis C Antibody    Specimen: Blood   Result Value Ref Range    Hep C Virus Ab <0.1 0.0 - 0.9 s/co ratio   CBC & Differential    Specimen: Blood   Result Value Ref Range    WBC 4.56 3.40 - 10.80 10*3/mm3    RBC 4.64 3.77 - 5.28 10*6/mm3    Hemoglobin 13.8 12.0 - 15.9 g/dL    Hematocrit 42.5 34.0 - 46.6 %    MCV 91.6 79.0 - 97.0 fL    MCH 29.7 26.6 - 33.0 pg    MCHC 32.5 31.5 - 35.7 g/dL    RDW 12.0 (L) 12.3 - 15.4 %    Platelets 321 140 - 450 10*3/mm3    Neutrophil Rel % 50.6 42.7 - 76.0 %    Lymphocyte Rel % 37.3 19.6 - 45.3 %    Monocyte Rel % 5.9 5.0 - 12.0 %    Eosinophil Rel % 4.4 0.3 - 6.2 %    Basophil Rel % 1.8 (H) 0.0 - 1.5 %    Neutrophils Absolute 2.31 1.70 - 7.00 10*3/mm3    Lymphocytes Absolute 1.70 0.70 - 3.10 10*3/mm3    Monocytes Absolute 0.27 0.10 - 0.90 10*3/mm3    Eosinophils Absolute 0.20 0.00 - 0.40 10*3/mm3    Basophils Absolute 0.08 0.00 - 0.20 10*3/mm3    Immature Granulocyte Rel % 0.0 0.0 - 0.5 %    Immature Grans Absolute 0.00 0.00 - 0.05 10*3/mm3    nRBC 0.0 0.0 - 0.2 /100 WBC     Result Review :                  Assessment and Plan    Diagnoses and all orders for this visit:    1. Chronic nonintractable headache, unspecified headache type  -     topiramate (Topamax) 25 MG tablet; Take 1 tablet by mouth 2 (Two) Times a Day.  Dispense: 180 tablet; Refill: 1    2. Attention deficit hyperactivity disorder (ADHD), combined type  -     amphetamine-dextroamphetamine (ADDERALL) 20 MG tablet; Take 1 tablet by mouth 2 (Two) Times a Day.  Dispense: 60 tablet; Refill: 0       hg  BMI is within normal parameters. No other follow-up  for BMI required.             Outpatient Medications Prior to Visit   Medication Sig Dispense Refill   • tretinoin (RETIN-A) 0.025 % cream Apply thin layer to face at bedtime for acne 45 g 3   • amphetamine-dextroamphetamine (ADDERALL) 20 MG tablet Take 1 tablet by mouth 2 (Two) Times a Day. 60 tablet 0   • buPROPion XL (Wellbutrin XL) 300 MG 24 hr tablet Take 1 tablet by mouth Daily. 90 tablet 1   • estradiol (ESTRACE) 0.1 MG/GM vaginal cream INSERT 1/2 GRAM PER VAGINA NIGHTLY FOR ONE MONTH 42.5 g 0   • topiramate (Topamax) 25 MG tablet Take 1 tablet by mouth 2 (Two) Times a Day. 180 tablet 1     No facility-administered medications prior to visit.     New Medications Ordered This Visit   Medications   • topiramate (Topamax) 25 MG tablet     Sig: Take 1 tablet by mouth 2 (Two) Times a Day.     Dispense:  180 tablet     Refill:  1   • amphetamine-dextroamphetamine (ADDERALL) 20 MG tablet     Sig: Take 1 tablet by mouth 2 (Two) Times a Day.     Dispense:  60 tablet     Refill:  0     [unfilled]  Medications Discontinued During This Encounter   Medication Reason   • estradiol (ESTRACE) 0.1 MG/GM vaginal cream *Therapy completed   • buPROPion XL (Wellbutrin XL) 300 MG 24 hr tablet *Therapy completed   • topiramate (Topamax) 25 MG tablet Reorder   • amphetamine-dextroamphetamine (ADDERALL) 20 MG tablet Reorder         Return in about 3 months (around 6/30/2023) for Recheck.    Patient was given instructions and counseling regarding her condition or for health maintenance advice. Please see specific information pulled into the AVS if appropriate.

## 2023-04-06 ENCOUNTER — OFFICE VISIT (OUTPATIENT)
Dept: OBSTETRICS AND GYNECOLOGY | Facility: CLINIC | Age: 48
End: 2023-04-06
Payer: COMMERCIAL

## 2023-04-06 VITALS
DIASTOLIC BLOOD PRESSURE: 82 MMHG | BODY MASS INDEX: 21.68 KG/M2 | WEIGHT: 127 LBS | SYSTOLIC BLOOD PRESSURE: 122 MMHG | HEIGHT: 64 IN

## 2023-04-06 DIAGNOSIS — Z01.419 ROUTINE GYNECOLOGICAL EXAMINATION: ICD-10-CM

## 2023-04-06 DIAGNOSIS — Z01.419 PAP SMEAR, LOW-RISK: Primary | ICD-10-CM

## 2023-04-06 DIAGNOSIS — N95.2 VAGINAL ATROPHY: ICD-10-CM

## 2023-04-06 DIAGNOSIS — Z12.31 ENCOUNTER FOR SCREENING MAMMOGRAM FOR MALIGNANT NEOPLASM OF BREAST: ICD-10-CM

## 2023-04-06 DIAGNOSIS — Z11.51 SPECIAL SCREENING EXAMINATION FOR HUMAN PAPILLOMAVIRUS (HPV): ICD-10-CM

## 2023-04-06 PROBLEM — F17.200 SMOKER: Status: RESOLVED | Noted: 2018-08-16 | Resolved: 2023-04-06

## 2023-04-06 PROBLEM — Z71.6 ENCOUNTER FOR SMOKING CESSATION COUNSELING: Status: RESOLVED | Noted: 2021-11-09 | Resolved: 2023-04-06

## 2023-04-06 RX ORDER — ESTRADIOL 0.1 MG/G
1 CREAM VAGINAL 2 TIMES WEEKLY
Qty: 45 G | Refills: 6 | Status: SHIPPED | OUTPATIENT
Start: 2023-04-06

## 2023-04-06 NOTE — PROGRESS NOTES
GYN Annual Exam     CC- Here for annual exam.     Ruth Casillas is a 47 y.o. female previous pt not seen in the last 3 years who presents for annual well woman exam.  She was last seen in 2019. She had a TLH/BS with OC  In  for DUB. She had a partial cuff dehiscence and repair in 2019. She has not been using vaginal E2 and we discussed the importance of use given her h/o dehiscence. She stopped smoking ! She has the info to have a Cscope but needs to make a call.  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 Marquez. No HF.     OB History        2    Para   2    Term   2            AB        Living   2       SAB        IAB        Ectopic        Molar        Multiple        Live Births              Obstetric Comments   2 C/S             Menarche: 14  Current contraception: status post hysterectomy - TLH/ BS with OC  History of abnormal Pap smear: yes, 2019- ASCUS + HPV of cuff, neg colpo, no bx  History of abnormal mammogram: no  Family history of uterine, colon or ovarian cancer: no  Family history of breast cancer: no  H/o STDs: none  Gardasil: none  Last pap: 2019- ASCUS + HPV  ALFONSO: none  Late cuff dehiscence     Health Maintenance   Topic Date Due   • COLORECTAL CANCER SCREENING  Never done   • TDAP/TD VACCINES (2 - Tdap) 2010   • Annual Gynecologic Pelvic and Breast Exam  2020   • COVID-19 Vaccine (3 - Booster for Pfizer series) 2021   • PAP SMEAR  2022   • INFLUENZA VACCINE  2023   • ANNUAL PHYSICAL  10/05/2023   • HEPATITIS C SCREENING  Completed   • Pneumococcal Vaccine 0-64  Aged Out       Past Medical History:   Diagnosis Date   • ADHD 2016    predom inattentive type. [facility].   • Allergic rhinitis    • Anemia    • Anxiety    • ASCUS with positive high risk HPV cervical 2019   • Bloating    • Cancer     melanoma in situ on back   • Chronic nonintractable headache     • Depression    • Dysmenorrhea    • Dyspareunia, female    • Fatigue    • Menorrhagia    • Migraines    • Rectal bleeding 2018   • Vaginal atrophy    • Vaginal laceration 2019    ADMITTED TO [facility]   • Vasovagal syncope 2016       Past Surgical History:   Procedure Laterality Date   •  SECTION N/A 2000    DR. ELIJAH HERNANDEZ AT [place]   •  SECTION N/A 2003    DR. ELIJAH HERNANDEZ AT [place]   • COLPOSCOPY  2019    [facility] Procedure.   • DIAGNOSTIC LAPAROSCOPY N/A 2019    Procedure: DIAGNOSTIC LAPAROSCOPY;  Surgeon: Brenda Almazan MD;  Location: [facility];  Service: Obstetrics/Gynecology   • EXAM UNDER ANESTHESIA N/A 2019    Procedure: EXAM UNDER ANESTHESIA;  Surgeon: Brenda Almazan MD;  Location: [facility];  Service: Obstetrics/Gynecology   • HYSTERECTOMY N/A 2015    TLH/BS with OC, DR. BRENDA ALMAZAN AT [facility]   • SKIN CANCER EXCISION     • TUBAL ABDOMINAL LIGATION Bilateral          Current Outpatient Medications:   •  amphetamine-dextroamphetamine (ADDERALL) 20 MG tablet, Take 1 tablet by mouth 2 (Two) Times a Day., Disp: 60 tablet, Rfl: 0  •  estradiol (ESTRACE) 0.1 MG/GM vaginal cream, Insert 1 g into the vagina 2 (Two) Times a Week., Disp: 45 g, Rfl: 6  •  topiramate (Topamax) 25 MG tablet, Take 1 tablet by mouth 2 (Two) Times a Day., Disp: 180 tablet, Rfl: 1  •  tretinoin (RETIN-A) 0.025 % cream, Apply thin layer to face at bedtime for acne, Disp: 45 g, Rfl: 3    No Known Allergies    Social History     Tobacco Use   • Smoking status: Former     Packs/day: 0.50     Years: 20.00     Pack years: 10.00     Types: Cigarettes     Quit date:      Years since quittin.2   • Smokeless tobacco: Never   • Tobacco comments:     quit for one month    Vaping Use   • Vaping Use: Never used   Substance Use Topics   • Alcohol use: Yes     Alcohol/week: 2.0 standard drinks     Types: 1 Glasses of wine, 1 Cans of beer per week     Comment: MODERATE  "AMT   • Drug use: No       Family History   Problem Relation Age of Onset   • Emphysema Father    • Depression Father    • Anxiety disorder Father    • Immunodeficiency Mother    • Severe combined immunodeficiency Mother    • Depression Sister    • Anxiety disorder Sister    • Depression Daughter    • Depression Maternal Grandfather    • Anxiety disorder Maternal Grandfather    • Breast cancer Neg Hx    • Ovarian cancer Neg Hx    • Colon cancer Neg Hx    • Deep vein thrombosis Neg Hx    • Pulmonary embolism Neg Hx    • Uterine cancer Neg Hx        Review of Systems   Constitutional: Negative for activity change, appetite change, fatigue, fever and unexpected weight change.   Respiratory: Negative for cough and shortness of breath.    Cardiovascular: Negative for chest pain and palpitations.   Gastrointestinal: Negative for abdominal distention, abdominal pain, anal bleeding, constipation, diarrhea, nausea and rectal pain.   Genitourinary: Negative for dyspareunia, dysuria, menstrual problem, pelvic pain, vaginal bleeding, vaginal discharge and vaginal pain.   Skin: Negative for color change and rash.   Neurological: Negative for syncope and headaches.   Psychiatric/Behavioral: Negative for dysphoric mood. The patient is not nervous/anxious.        /82   Ht 162.6 cm (64\")   Wt 57.6 kg (127 lb)   BMI 21.80 kg/m²     Physical Exam   Constitutional: She is oriented to person, place, and time. She appears well-developed.   HENT:   Head: Normocephalic and atraumatic.   Eyes: Conjunctivae are normal. No scleral icterus.   Neck: No thyromegaly present.   Cardiovascular: Normal rate, regular rhythm and normal heart sounds.   Pulmonary/Chest: Effort normal and breath sounds normal. Right breast exhibits no inverted nipple, no mass, no nipple discharge, no skin change and no tenderness. Left breast exhibits no inverted nipple, no mass, no nipple discharge, no skin change and no tenderness.   Abdominal: Soft. Normal " appearance and bowel sounds are normal. She exhibits no distension and no mass. There is no abdominal tenderness. There is no rebound and no guarding. No hernia.   Genitourinary: Rectum:      No external hemorrhoid.      Pelvic exam was performed with patient supine.   There is no rash, tenderness or lesion on the right labia. There is no rash, tenderness or lesion on the left labia. Right adnexum displays no mass, no tenderness and no fullness. Left adnexum displays no mass, no tenderness and no fullness.    No vaginal discharge, erythema, tenderness or bleeding.   No erythema, tenderness or bleeding in the vagina.    No foreign body in the vagina.      No signs of injury in the vagina.      Genitourinary Comments: Uterus and cervix absent  Normal cuff  Mild atrophy noted     Neurological: She is alert and oriented to person, place, and time.   Skin: Skin is warm and dry.   Psychiatric: Her behavior is normal. Mood, judgment and thought content normal.   Nursing note and vitals reviewed.         Assessment/Plan    1) GYN HM: check pap/HPV   SBE demonstrated and encouraged.  2) STD screening: declines Condoms encouraged.  3) Contraception: s/p TLH  4) Family Planning: no plans, encourage folic acid daily  5) Diet and Exercise discussed  6) Smoking Status: quit !   7) Cscope- past due, enc compliance. Pt has info to schedule.   8) MMG-  UTD 10/2022 B1. Schedule MMG 10/2023  9)Vaginal atrophy- Cont vaginal E2 twice a week.   10)Parts of this document have been copied or forwarded from her previous visits and have been reviewed, updated and edited as indicated.   11)I saw the patient with a face mask, gloves and eye protection  The patient herself was masked.  Social distancing was observed as appropriate.  13)Follow up prn or 1 year annual       Diagnoses and all orders for this visit:    1. Pap smear, low-risk (Primary)  -     IGP, Apt HPV,rfx 16 / 18,45    2. Routine gynecological examination  -     POC Urinalysis  Dipstick  -     IGP, Apt HPV,rfx 16 / 18,45    3. Special screening examination for human papillomavirus (HPV)  -     IGP, Apt HPV,rfx 16 / 18,45    4. Encounter for screening mammogram for malignant neoplasm of breast  -     Mammo Screening Digital Tomosynthesis Bilateral With CAD; Future    5. Vaginal atrophy    Other orders  -     estradiol (ESTRACE) 0.1 MG/GM vaginal cream; Insert 1 g into the vagina 2 (Two) Times a Week.  Dispense: 45 g; Refill: 6          Brenda Almazan MD  4/6/2023  10:51 EDT

## 2023-04-07 ENCOUNTER — PATIENT ROUNDING (BHMG ONLY) (OUTPATIENT)
Dept: OBSTETRICS AND GYNECOLOGY | Facility: CLINIC | Age: 48
End: 2023-04-07
Payer: COMMERCIAL

## 2023-04-07 NOTE — PROGRESS NOTES
A MY-CHART MESSAGE HAS BEEN SENT TO THE PATIENT [place] [facility] [place]

## 2023-04-17 LAB
CYTOLOGIST CVX/VAG CYTO: ABNORMAL
CYTOLOGY CVX/VAG DOC CYTO: ABNORMAL
CYTOLOGY CVX/VAG DOC THIN PREP: ABNORMAL
DX ICD CODE: ABNORMAL
DX ICD CODE: ABNORMAL
HIV 1 & 2 AB SER-IMP: ABNORMAL
HPV I/H RISK 4 DNA CVX QL PROBE+SIG AMP: ABNORMAL
OTHER STN SPEC: ABNORMAL
PATHOLOGIST CVX/VAG CYTO: ABNORMAL
RECOM F/U CVX/VAG CYTO: ABNORMAL
REQUEST PROBLEM: NORMAL
STAT OF ADQ CVX/VAG CYTO-IMP: ABNORMAL

## 2023-04-18 NOTE — PROGRESS NOTES
PIP= pt has low grade changes on pap of the cuff. She needs colpo of the cuff in hte next 4-6 weeks.

## 2023-05-22 ENCOUNTER — OFFICE VISIT (OUTPATIENT)
Dept: OBSTETRICS AND GYNECOLOGY | Facility: CLINIC | Age: 48
End: 2023-05-22
Payer: COMMERCIAL

## 2023-05-22 VITALS
DIASTOLIC BLOOD PRESSURE: 62 MMHG | WEIGHT: 128 LBS | BODY MASS INDEX: 21.85 KG/M2 | HEIGHT: 64 IN | SYSTOLIC BLOOD PRESSURE: 110 MMHG

## 2023-05-22 DIAGNOSIS — R87.622 LGSIL PAP SMEAR OF VAGINA: Primary | ICD-10-CM

## 2023-05-22 NOTE — PROGRESS NOTES
Colposcopy Procedure Note    Procedures          Indications: Most recent Pap smear showed: low-grade squamous intraepithelial neoplasia (LGSIL - encompassing HPV,mild dysplasia,IKE I)    Prior cervical/vaginal disease: 11/2019- ASCUS + HPV of cuff, colpo neg, no bx  Prior cervical treatment: no treatment.  Contraception: status post hysterectomy    Procedure Details   The risks and benefits of the procedure and Verbal informed consent obtained.  Pregnancy test: not done    Speculum placed in vagina and excellent visualization of cervix achieved, cervix swabbed x 3 with acetic acid solution and Lugol's solution     Findings:  Cuff: no visible lesions, no mosaicism, no punctation and no abnormal vasculature;   Vaginal inspection: no lesions appreciated  Vulvar colposcopy: vulvar colposcopy not performed.  Colpo adequacy: was not adquate    Specimens: none    Colpo impression: normal cuff    Complications: none.   Patient tolerated procedure well.     Smoking Status: No      Plan:  1. LGSIL of cuff- s/p colpo, no biopsies taken. Repeat pap/HPV in 1 year (4/2024)     Brenda Almazan MD   5/22/2023  12:53 EDT

## 2023-05-30 DIAGNOSIS — F90.2 ATTENTION DEFICIT HYPERACTIVITY DISORDER (ADHD), COMBINED TYPE: ICD-10-CM

## 2023-05-31 RX ORDER — DEXTROAMPHETAMINE SACCHARATE, AMPHETAMINE ASPARTATE, DEXTROAMPHETAMINE SULFATE AND AMPHETAMINE SULFATE 5; 5; 5; 5 MG/1; MG/1; MG/1; MG/1
20 TABLET ORAL
Qty: 60 TABLET | Refills: 0 | Status: SHIPPED | OUTPATIENT
Start: 2023-05-31

## 2023-05-31 NOTE — TELEPHONE ENCOUNTER
Rx Refill Note  Requested Prescriptions     Pending Prescriptions Disp Refills    amphetamine-dextroamphetamine (ADDERALL) 20 MG tablet 60 tablet 0     Sig: Take 1 tablet by mouth 2 (Two) Times a Day.      Last office visit with prescribing clinician: 10/5/2022   Last telemedicine visit with prescribing clinician: 3/30/2023   Next office visit with prescribing clinician: Visit date not found                         Would you like a call back once the refill request has been completed: [] Yes [] No    If the office needs to give you a call back, can they leave a voicemail: [] Yes [] No    Rosa M Crisostomo MA  05/31/23, 14:24 EDT

## 2023-08-09 ENCOUNTER — OFFICE VISIT (OUTPATIENT)
Dept: INTERNAL MEDICINE | Facility: CLINIC | Age: 48
End: 2023-08-09
Payer: COMMERCIAL

## 2023-08-09 VITALS
SYSTOLIC BLOOD PRESSURE: 98 MMHG | DIASTOLIC BLOOD PRESSURE: 60 MMHG | BODY MASS INDEX: 22.57 KG/M2 | WEIGHT: 132.2 LBS | HEIGHT: 64 IN | OXYGEN SATURATION: 98 % | HEART RATE: 80 BPM | TEMPERATURE: 97.5 F

## 2023-08-09 DIAGNOSIS — G43.109 CHRONIC MIGRAINE WITH AURA: Primary | ICD-10-CM

## 2023-08-09 DIAGNOSIS — F41.9 ANXIETY: ICD-10-CM

## 2023-08-09 DIAGNOSIS — F90.2 ATTENTION DEFICIT HYPERACTIVITY DISORDER (ADHD), COMBINED TYPE: ICD-10-CM

## 2023-08-09 PROCEDURE — 99214 OFFICE O/P EST MOD 30 MIN: CPT | Performed by: INTERNAL MEDICINE

## 2023-08-09 RX ORDER — DEXTROAMPHETAMINE SACCHARATE, AMPHETAMINE ASPARTATE, DEXTROAMPHETAMINE SULFATE AND AMPHETAMINE SULFATE 5; 5; 5; 5 MG/1; MG/1; MG/1; MG/1
20 TABLET ORAL
Qty: 60 TABLET | Refills: 0 | Status: SHIPPED | OUTPATIENT
Start: 2023-08-09

## 2023-08-09 RX ORDER — FLUOXETINE HYDROCHLORIDE 20 MG/1
20 CAPSULE ORAL DAILY
Qty: 90 CAPSULE | Refills: 1 | Status: SHIPPED | OUTPATIENT
Start: 2023-08-09

## 2023-08-09 NOTE — PROGRESS NOTES
Ruth Casillas is a 47 y.o. female, who presents with a chief complaint of   Chief Complaint   Patient presents with    Med Refill           HPI   Pt here for f/u and bc of cold sx.  She has been using a neti pot.  She was on claritin but stopped it recently    Pt has chronic migraines.  Topamax helps some but it makes her feel bad.      She would like to go back on her prozac.     The following portions of the patient's history were reviewed and updated as appropriate: allergies, current medications, past family history, past medical history, past social history, past surgical history and problem list.    Allergies: Patient has no known allergies.    Review of Systems   Constitutional: Negative.    HENT: Negative.     Eyes: Negative.    Respiratory: Negative.     Cardiovascular: Negative.    Gastrointestinal: Negative.    Endocrine: Negative.    Genitourinary: Negative.    Musculoskeletal: Negative.    Skin: Negative.    Allergic/Immunologic: Negative.    Neurological: Negative.    Hematological: Negative.    Psychiatric/Behavioral: Negative.     All other systems reviewed and are negative.          Wt Readings from Last 3 Encounters:   08/09/23 60 kg (132 lb 3.2 oz)   05/22/23 58.1 kg (128 lb)   04/06/23 57.6 kg (127 lb)     Temp Readings from Last 3 Encounters:   08/09/23 97.5 øF (36.4 øC) (Infrared)   10/05/22 97.8 øF (36.6 øC)   01/03/22 98 øF (36.7 øC) (Temporal)     BP Readings from Last 3 Encounters:   08/09/23 98/60   05/22/23 110/62   04/06/23 122/82     Pulse Readings from Last 3 Encounters:   08/09/23 80   10/05/22 77   01/03/22 75     Body mass index is 22.68 kg/mý.  SpO2 Readings from Last 3 Encounters:   08/09/23 98%   10/05/22 99%   01/03/22 99%          Physical Exam  Vitals and nursing note reviewed.   Constitutional:       General: She is not in acute distress.     Appearance: She is well-developed.   HENT:      Head: Normocephalic and atraumatic.      Right Ear: External ear normal.       Left Ear: External ear normal.      Nose: Nose normal.   Eyes:      Conjunctiva/sclera: Conjunctivae normal.      Pupils: Pupils are equal, round, and reactive to light.   Cardiovascular:      Rate and Rhythm: Normal rate and regular rhythm.      Heart sounds: Normal heart sounds.   Pulmonary:      Effort: Pulmonary effort is normal. No respiratory distress.      Breath sounds: Normal breath sounds. No wheezing.   Musculoskeletal:         General: Normal range of motion.      Cervical back: Normal range of motion and neck supple.      Comments: Normal gait   Skin:     General: Skin is warm and dry.   Neurological:      Mental Status: She is alert and oriented to person, place, and time.   Psychiatric:         Behavior: Behavior normal.         Thought Content: Thought content normal.         Judgment: Judgment normal.       Results for orders placed or performed in visit on 04/06/23   Request Problem   Result Value Ref Range    Request Problem CANCELED    POC Urinalysis Dipstick    Specimen: Urine   Result Value Ref Range    Color Yellow Yellow, Straw, Dark Yellow, Dana    Clarity, UA Clear Clear    Glucose, UA Negative Negative mg/dL    Bilirubin Negative Negative    Ketones, UA Negative Negative    Specific Gravity  1.025 1.005 - 1.030    Blood, UA Negative Negative    pH, Urine 5.0 5.0 - 8.0    Protein, POC Negative Negative mg/dL    Urobilinogen, UA Normal Normal, 0.2 E.U./dL    Leukocytes Negative Negative    Nitrite, UA Negative Negative   IGP, Apt HPV,rfx 16 / 18,45    Specimen: Vagina; ThinPrep Vial   Result Value Ref Range    Diagnosis Comment (A)     Recommendation: Comment (A)     Specimen adequacy: Comment     Clinician Provided ICD-10: Comment     Performed by: Comment     Electronically signed by: Comment     . .     Pathologist Provided ICD-10: Comment     Note: Comment     Method: Comment     HPV Aptima CANCELED      Result Review :                  Assessment and Plan    Diagnoses and all orders  for this visit:    1. Chronic migraine with aura (Primary)  -     Rimegepant Sulfate (NURTEC) 75 MG tablet dispersible tablet; Take 1 tablet by mouth Every Other Day.  Dispense: 16 tablet; Refill: 5    2. Attention deficit hyperactivity disorder (ADHD), combined type  -     amphetamine-dextroamphetamine (ADDERALL) 20 MG tablet; Take 1 tablet by mouth 2 (Two) Times a Day.  Dispense: 60 tablet; Refill: 0    3. Anxiety  -     FLUoxetine (PROzac) 20 MG capsule; Take 1 capsule by mouth Daily.  Dispense: 90 capsule; Refill: 1         BMI is within normal parameters. No other follow-up for BMI required.             Outpatient Medications Prior to Visit   Medication Sig Dispense Refill    estradiol (ESTRACE) 0.1 MG/GM vaginal cream Insert 1 g into the vagina 2 (Two) Times a Week. 45 g 6    tretinoin (RETIN-A) 0.025 % cream Apply thin layer to face at bedtime for acne 45 g 3    amphetamine-dextroamphetamine (ADDERALL) 20 MG tablet Take 1 tablet by mouth 2 (Two) Times a Day. 60 tablet 0    topiramate (Topamax) 25 MG tablet Take 1 tablet by mouth 2 (Two) Times a Day. 180 tablet 1     No facility-administered medications prior to visit.     New Medications Ordered This Visit   Medications    Rimegepant Sulfate (NURTEC) 75 MG tablet dispersible tablet     Sig: Take 1 tablet by mouth Every Other Day.     Dispense:  16 tablet     Refill:  5     Pt has been on topamax and feels bad on med    FLUoxetine (PROzac) 20 MG capsule     Sig: Take 1 capsule by mouth Daily.     Dispense:  90 capsule     Refill:  1    amphetamine-dextroamphetamine (ADDERALL) 20 MG tablet     Sig: Take 1 tablet by mouth 2 (Two) Times a Day.     Dispense:  60 tablet     Refill:  0     [unfilled]  Medications Discontinued During This Encounter   Medication Reason    topiramate (Topamax) 25 MG tablet *Therapy completed    amphetamine-dextroamphetamine (ADDERALL) 20 MG tablet Reorder         Return in about 3 months (around 11/9/2023) for Recheck.    Patient  was given instructions and counseling regarding her condition or for health maintenance advice. Please see specific information pulled into the AVS if appropriate.

## 2023-09-11 DIAGNOSIS — F90.2 ATTENTION DEFICIT HYPERACTIVITY DISORDER (ADHD), COMBINED TYPE: ICD-10-CM

## 2023-09-11 NOTE — TELEPHONE ENCOUNTER
Rx Refill Note  Requested Prescriptions     Pending Prescriptions Disp Refills    amphetamine-dextroamphetamine (ADDERALL) 20 MG tablet 60 tablet 0     Sig: Take 1 tablet by mouth 2 (Two) Times a Day.      Last office visit with prescribing clinician: 8/9/2023   Last telemedicine visit with prescribing clinician: 3/30/2023   Next office visit with prescribing clinician: Visit date not found                         Would you like a call back once the refill request has been completed: [] Yes [] No    If the office needs to give you a call back, can they leave a voicemail: [] Yes [] No    Corina Martínez MA  09/11/23, 15:46 EDT

## 2023-09-12 RX ORDER — DEXTROAMPHETAMINE SACCHARATE, AMPHETAMINE ASPARTATE, DEXTROAMPHETAMINE SULFATE AND AMPHETAMINE SULFATE 5; 5; 5; 5 MG/1; MG/1; MG/1; MG/1
20 TABLET ORAL
Qty: 60 TABLET | Refills: 0 | Status: SHIPPED | OUTPATIENT
Start: 2023-09-12

## 2023-10-17 DIAGNOSIS — F90.2 ATTENTION DEFICIT HYPERACTIVITY DISORDER (ADHD), COMBINED TYPE: ICD-10-CM

## 2023-10-17 RX ORDER — DEXTROAMPHETAMINE SACCHARATE, AMPHETAMINE ASPARTATE, DEXTROAMPHETAMINE SULFATE AND AMPHETAMINE SULFATE 5; 5; 5; 5 MG/1; MG/1; MG/1; MG/1
20 TABLET ORAL
Qty: 60 TABLET | Refills: 0 | Status: SHIPPED | OUTPATIENT
Start: 2023-10-17

## 2023-10-17 NOTE — TELEPHONE ENCOUNTER
Rx Refill Note  Requested Prescriptions     Pending Prescriptions Disp Refills    amphetamine-dextroamphetamine (ADDERALL) 20 MG tablet 60 tablet 0     Sig: Take 1 tablet by mouth 2 (Two) Times a Day.      Last office visit with prescribing clinician: 8/9/2023   Last telemedicine visit with prescribing clinician: Visit date not found   Next office visit with prescribing clinician: Visit date not found                         Would you like a call back once the refill request has been completed: [] Yes [] No    If the office needs to give you a call back, can they leave a voicemail: [] Yes [] No    Kingsley Gonzalez, PCT  10/17/23, 14:42 EDT

## 2023-10-25 ENCOUNTER — HOSPITAL ENCOUNTER (OUTPATIENT)
Dept: MAMMOGRAPHY | Facility: HOSPITAL | Age: 48
Discharge: HOME | End: 2023-10-25
Admitting: OBSTETRICS & GYNECOLOGY
Payer: COMMERCIAL

## 2023-10-25 DIAGNOSIS — Z12.31 ENCOUNTER FOR SCREENING MAMMOGRAM FOR MALIGNANT NEOPLASM OF BREAST: ICD-10-CM

## 2023-10-25 PROCEDURE — 77063 BREAST TOMOSYNTHESIS BI: CPT

## 2023-10-25 PROCEDURE — 77067 SCR MAMMO BI INCL CAD: CPT

## 2024-01-10 ENCOUNTER — OFFICE VISIT (OUTPATIENT)
Dept: INTERNAL MEDICINE | Facility: CLINIC | Age: 49
End: 2024-01-10
Payer: COMMERCIAL

## 2024-01-10 VITALS
OXYGEN SATURATION: 99 % | WEIGHT: 142 LBS | HEIGHT: 64 IN | BODY MASS INDEX: 24.24 KG/M2 | SYSTOLIC BLOOD PRESSURE: 116 MMHG | DIASTOLIC BLOOD PRESSURE: 68 MMHG | TEMPERATURE: 98.6 F | HEART RATE: 87 BPM

## 2024-01-10 DIAGNOSIS — Z00.00 HEALTHCARE MAINTENANCE: ICD-10-CM

## 2024-01-10 DIAGNOSIS — G43.E09 CHRONIC MIGRAINE WITH AURA WITHOUT STATUS MIGRAINOSUS, NOT INTRACTABLE: ICD-10-CM

## 2024-01-10 DIAGNOSIS — F41.9 ANXIETY: ICD-10-CM

## 2024-01-10 DIAGNOSIS — F90.2 ATTENTION DEFICIT HYPERACTIVITY DISORDER (ADHD), COMBINED TYPE: Primary | ICD-10-CM

## 2024-01-10 DIAGNOSIS — Z12.11 COLON CANCER SCREENING: ICD-10-CM

## 2024-01-10 PROCEDURE — 99214 OFFICE O/P EST MOD 30 MIN: CPT | Performed by: INTERNAL MEDICINE

## 2024-01-10 PROCEDURE — 90715 TDAP VACCINE 7 YRS/> IM: CPT | Performed by: INTERNAL MEDICINE

## 2024-01-10 PROCEDURE — 90471 IMMUNIZATION ADMIN: CPT | Performed by: INTERNAL MEDICINE

## 2024-01-10 RX ORDER — MULTIPLE VITAMINS W/ MINERALS TAB 9MG-400MCG
1 TAB ORAL DAILY
COMMUNITY

## 2024-01-10 RX ORDER — DEXTROAMPHETAMINE SACCHARATE, AMPHETAMINE ASPARTATE, DEXTROAMPHETAMINE SULFATE AND AMPHETAMINE SULFATE 5; 5; 5; 5 MG/1; MG/1; MG/1; MG/1
20 TABLET ORAL
Qty: 60 TABLET | Refills: 0 | Status: SHIPPED | OUTPATIENT
Start: 2024-01-10

## 2024-01-10 NOTE — PROGRESS NOTES
Ruth Casillas is a 48 y.o. female, who presents with a chief complaint of   Chief Complaint   Patient presents with    ADHD     Follow up visit            HPI   Pt here for f/u      Pt has chronic migraines.  Topamax helps some but it makes her feel bad.  she started nurtec at her last OV.  She does feel like it has helped her headaches and no side effects.       Pt back on her prozac and doing well.       The following portions of the patient's history were reviewed and updated as appropriate: allergies, current medications, past family history, past medical history, past social history, past surgical history and problem list.    Allergies: Patient has no known allergies.    Review of Systems   Constitutional: Negative.    HENT: Negative.     Eyes: Negative.    Respiratory: Negative.     Cardiovascular: Negative.    Gastrointestinal: Negative.    Endocrine: Negative.    Genitourinary: Negative.    Musculoskeletal: Negative.    Skin: Negative.    Allergic/Immunologic: Negative.    Neurological: Negative.    Hematological: Negative.    Psychiatric/Behavioral: Negative.     All other systems reviewed and are negative.            Wt Readings from Last 3 Encounters:   01/10/24 64.4 kg (142 lb)   08/09/23 60 kg (132 lb 3.2 oz)   05/22/23 58.1 kg (128 lb)     Temp Readings from Last 3 Encounters:   01/10/24 98.6 °F (37 °C) (Infrared)   08/09/23 97.5 °F (36.4 °C) (Infrared)   10/05/22 97.8 °F (36.6 °C)     BP Readings from Last 3 Encounters:   01/10/24 116/68   08/09/23 98/60   05/22/23 110/62     Pulse Readings from Last 3 Encounters:   01/10/24 87   08/09/23 80   10/05/22 77     Body mass index is 24.36 kg/m².  SpO2 Readings from Last 3 Encounters:   01/10/24 99%   08/09/23 98%   10/05/22 99%          Physical Exam  Vitals and nursing note reviewed.   Constitutional:       General: She is not in acute distress.     Appearance: She is well-developed.   HENT:      Head: Normocephalic and atraumatic.      Right  Ear: External ear normal.      Left Ear: External ear normal.      Nose: Nose normal.   Eyes:      Conjunctiva/sclera: Conjunctivae normal.      Pupils: Pupils are equal, round, and reactive to light.   Cardiovascular:      Rate and Rhythm: Normal rate and regular rhythm.      Heart sounds: Normal heart sounds.   Pulmonary:      Effort: Pulmonary effort is normal. No respiratory distress.      Breath sounds: Normal breath sounds. No wheezing.   Musculoskeletal:         General: Normal range of motion.      Cervical back: Normal range of motion and neck supple.      Comments: Normal gait   Skin:     General: Skin is warm and dry.   Neurological:      Mental Status: She is alert and oriented to person, place, and time.   Psychiatric:         Behavior: Behavior normal.         Thought Content: Thought content normal.         Judgment: Judgment normal.         Results for orders placed or performed in visit on 04/06/23   Request Problem   Result Value Ref Range    Request Problem CANCELED    POC Urinalysis Dipstick    Specimen: Urine   Result Value Ref Range    Color Yellow Yellow, Straw, Dark Yellow, Dana    Clarity, UA Clear Clear    Glucose, UA Negative Negative mg/dL    Bilirubin Negative Negative    Ketones, UA Negative Negative    Specific Gravity  1.025 1.005 - 1.030    Blood, UA Negative Negative    pH, Urine 5.0 5.0 - 8.0    Protein, POC Negative Negative mg/dL    Urobilinogen, UA Normal Normal, 0.2 E.U./dL    Leukocytes Negative Negative    Nitrite, UA Negative Negative   IGP, Apt HPV,rfx 16 / 18,45    Specimen: Vagina; ThinPrep Vial   Result Value Ref Range    Diagnosis Comment (A)     Recommendation: Comment (A)     Specimen adequacy: Comment     Clinician Provided ICD-10: Comment     Performed by: Comment     Electronically signed by: Comment     . .     Pathologist Provided ICD-10: Comment     Note: Comment     Method: Comment     HPV Aptima CANCELED      Result Review :   The following data was reviewed  by: Jerri Wellington MD on 01/10/2024:               Assessment and Plan    Diagnoses and all orders for this visit:    1. Attention deficit hyperactivity disorder (ADHD), combined type (Primary)  -     amphetamine-dextroamphetamine (ADDERALL) 20 MG tablet; Take 1 tablet by mouth 2 (Two) Times a Day.  Dispense: 60 tablet; Refill: 0    2. Colon cancer screening  -     Cologuard - Stool, Per Rectum; Future    3. Healthcare maintenance  -     Tdap Vaccine Greater Than or Equal To 8yo IM  -     CBC & Differential  -     Comprehensive Metabolic Panel  -     Hemoglobin A1c  -     Lipid Panel With LDL / HDL Ratio  -     TSH  -     T4, Free    4. Chronic migraine with aura without status migrainosus, not intractable  -     CBC & Differential  -     Comprehensive Metabolic Panel  -     Hemoglobin A1c  -     Lipid Panel With LDL / HDL Ratio  -     TSH  -     T4, Free    5. Anxiety  -     CBC & Differential  -     Comprehensive Metabolic Panel  -     Hemoglobin A1c  -     Lipid Panel With LDL / HDL Ratio  -     TSH  -     T4, Free         BMI is within normal parameters. No other follow-up for BMI required.             Outpatient Medications Prior to Visit   Medication Sig Dispense Refill    estradiol (ESTRACE) 0.1 MG/GM vaginal cream Insert 1 g into the vagina 2 (Two) Times a Week. 45 g 6    FLUoxetine (PROzac) 20 MG capsule Take 1 capsule by mouth Daily. 90 capsule 1    multivitamin with minerals tablet tablet Take 1 tablet by mouth Daily.      Rimegepant Sulfate (NURTEC) 75 MG tablet dispersible tablet Take 1 tablet by mouth Every Other Day. 16 tablet 5    tretinoin (RETIN-A) 0.025 % cream Apply thin layer to face at bedtime for acne 45 g 3    amphetamine-dextroamphetamine (ADDERALL) 20 MG tablet Take 1 tablet by mouth 2 (Two) Times a Day. 60 tablet 0     No facility-administered medications prior to visit.     New Medications Ordered This Visit   Medications    amphetamine-dextroamphetamine (ADDERALL) 20 MG tablet      Sig: Take 1 tablet by mouth 2 (Two) Times a Day.     Dispense:  60 tablet     Refill:  0     [unfilled]  Medications Discontinued During This Encounter   Medication Reason    amphetamine-dextroamphetamine (ADDERALL) 20 MG tablet Reorder         Return in about 3 months (around 4/10/2024) for Recheck.    Patient was given instructions and counseling regarding her condition or for health maintenance advice. Please see specific information pulled into the AVS if appropriate.

## 2024-01-11 LAB
ALBUMIN SERPL-MCNC: 4.9 G/DL (ref 3.5–5.2)
ALBUMIN/GLOB SERPL: 2.5 G/DL
ALP SERPL-CCNC: 73 U/L (ref 39–117)
ALT SERPL-CCNC: 24 U/L (ref 1–33)
AST SERPL-CCNC: 27 U/L (ref 1–32)
BASOPHILS # BLD AUTO: 0.07 10*3/MM3 (ref 0–0.2)
BASOPHILS NFR BLD AUTO: 1.3 % (ref 0–1.5)
BILIRUB SERPL-MCNC: 0.4 MG/DL (ref 0–1.2)
BUN SERPL-MCNC: 18 MG/DL (ref 6–20)
BUN/CREAT SERPL: 20.7 (ref 7–25)
CALCIUM SERPL-MCNC: 10.1 MG/DL (ref 8.6–10.5)
CHLORIDE SERPL-SCNC: 100 MMOL/L (ref 98–107)
CHOLEST SERPL-MCNC: 164 MG/DL (ref 0–200)
CO2 SERPL-SCNC: 27.6 MMOL/L (ref 22–29)
CREAT SERPL-MCNC: 0.87 MG/DL (ref 0.57–1)
EGFRCR SERPLBLD CKD-EPI 2021: 81.8 ML/MIN/1.73
EOSINOPHIL # BLD AUTO: 0.19 10*3/MM3 (ref 0–0.4)
EOSINOPHIL NFR BLD AUTO: 3.6 % (ref 0.3–6.2)
ERYTHROCYTE [DISTWIDTH] IN BLOOD BY AUTOMATED COUNT: 12 % (ref 12.3–15.4)
GLOBULIN SER CALC-MCNC: 2 GM/DL
GLUCOSE SERPL-MCNC: 87 MG/DL (ref 65–99)
HBA1C MFR BLD: 5.2 % (ref 4.8–5.6)
HCT VFR BLD AUTO: 42.4 % (ref 34–46.6)
HDLC SERPL-MCNC: 72 MG/DL (ref 40–60)
HGB BLD-MCNC: 14.3 G/DL (ref 12–15.9)
IMM GRANULOCYTES # BLD AUTO: 0.01 10*3/MM3 (ref 0–0.05)
IMM GRANULOCYTES NFR BLD AUTO: 0.2 % (ref 0–0.5)
LDLC SERPL CALC-MCNC: 80 MG/DL (ref 0–100)
LDLC/HDLC SERPL: 1.11 {RATIO}
LYMPHOCYTES # BLD AUTO: 1.35 10*3/MM3 (ref 0.7–3.1)
LYMPHOCYTES NFR BLD AUTO: 25.6 % (ref 19.6–45.3)
MCH RBC QN AUTO: 30.9 PG (ref 26.6–33)
MCHC RBC AUTO-ENTMCNC: 33.7 G/DL (ref 31.5–35.7)
MCV RBC AUTO: 91.6 FL (ref 79–97)
MONOCYTES # BLD AUTO: 0.26 10*3/MM3 (ref 0.1–0.9)
MONOCYTES NFR BLD AUTO: 4.9 % (ref 5–12)
NEUTROPHILS # BLD AUTO: 3.39 10*3/MM3 (ref 1.7–7)
NEUTROPHILS NFR BLD AUTO: 64.4 % (ref 42.7–76)
NRBC BLD AUTO-RTO: 0 /100 WBC (ref 0–0.2)
PLATELET # BLD AUTO: 322 10*3/MM3 (ref 140–450)
POTASSIUM SERPL-SCNC: 5 MMOL/L (ref 3.5–5.2)
PROT SERPL-MCNC: 6.9 G/DL (ref 6–8.5)
RBC # BLD AUTO: 4.63 10*6/MM3 (ref 3.77–5.28)
SODIUM SERPL-SCNC: 137 MMOL/L (ref 136–145)
T4 FREE SERPL-MCNC: 1 NG/DL (ref 0.93–1.7)
TRIGL SERPL-MCNC: 59 MG/DL (ref 0–150)
TSH SERPL DL<=0.005 MIU/L-ACNC: 2.31 UIU/ML (ref 0.27–4.2)
VLDLC SERPL CALC-MCNC: 12 MG/DL (ref 5–40)
WBC # BLD AUTO: 5.27 10*3/MM3 (ref 3.4–10.8)

## 2024-02-14 DIAGNOSIS — F41.9 ANXIETY: ICD-10-CM

## 2024-02-14 DIAGNOSIS — F90.2 ATTENTION DEFICIT HYPERACTIVITY DISORDER (ADHD), COMBINED TYPE: ICD-10-CM

## 2024-02-14 RX ORDER — FLUOXETINE HYDROCHLORIDE 20 MG/1
20 CAPSULE ORAL DAILY
Qty: 90 CAPSULE | Refills: 1 | Status: SHIPPED | OUTPATIENT
Start: 2024-02-14

## 2024-02-14 RX ORDER — DEXTROAMPHETAMINE SACCHARATE, AMPHETAMINE ASPARTATE, DEXTROAMPHETAMINE SULFATE AND AMPHETAMINE SULFATE 5; 5; 5; 5 MG/1; MG/1; MG/1; MG/1
20 TABLET ORAL
Qty: 60 TABLET | Refills: 0 | Status: SHIPPED | OUTPATIENT
Start: 2024-02-14

## 2024-03-19 DIAGNOSIS — G43.E09 CHRONIC MIGRAINE WITH AURA: ICD-10-CM

## 2024-03-19 DIAGNOSIS — F90.2 ATTENTION DEFICIT HYPERACTIVITY DISORDER (ADHD), COMBINED TYPE: ICD-10-CM

## 2024-03-19 RX ORDER — DEXTROAMPHETAMINE SACCHARATE, AMPHETAMINE ASPARTATE, DEXTROAMPHETAMINE SULFATE AND AMPHETAMINE SULFATE 5; 5; 5; 5 MG/1; MG/1; MG/1; MG/1
20 TABLET ORAL
Qty: 60 TABLET | Refills: 0 | Status: SHIPPED | OUTPATIENT
Start: 2024-03-19

## 2024-03-19 RX ORDER — RIMEGEPANT SULFATE 75 MG/75MG
1 TABLET, ORALLY DISINTEGRATING ORAL EVERY OTHER DAY
Qty: 16 TABLET | Refills: 5 | Status: SHIPPED | OUTPATIENT
Start: 2024-03-19

## 2024-03-19 NOTE — TELEPHONE ENCOUNTER
Pt is requesting a switch from topamax to nurtec and refill of adderall    Rx Refill Note  Requested Prescriptions     Pending Prescriptions Disp Refills    Rimegepant Sulfate (Nurtec) 75 MG tablet dispersible tablet 16 tablet 5     Sig: Take 1 tablet by mouth Every Other Day.    amphetamine-dextroamphetamine (ADDERALL) 20 MG tablet 60 tablet 0     Sig: Take 1 tablet by mouth 2 (Two) Times a Day.      Last office visit with prescribing clinician: 1/10/2024   Last telemedicine visit with prescribing clinician: Visit date not found   Next office visit with prescribing clinician: Visit date not found                         Would you like a call back once the refill request has been completed: [] Yes [] No    If the office needs to give you a call back, can they leave a voicemail: [] Yes [] No    Kingsley Portillo MA  03/19/24, 09:36 EDT

## 2024-04-29 DIAGNOSIS — F90.2 ATTENTION DEFICIT HYPERACTIVITY DISORDER (ADHD), COMBINED TYPE: ICD-10-CM

## 2024-04-30 NOTE — TELEPHONE ENCOUNTER
Rx Refill Note  Requested Prescriptions     Pending Prescriptions Disp Refills    amphetamine-dextroamphetamine (ADDERALL) 20 MG tablet 60 tablet 0     Sig: Take 1 tablet by mouth 2 (Two) Times a Day.      Last office visit with prescribing clinician: 1/10/2024   Last telemedicine visit with prescribing clinician: Visit date not found   Next office visit with prescribing clinician: Visit date not found                         Would you like a call back once the refill request has been completed: [] Yes [] No    If the office needs to give you a call back, can they leave a voicemail: [] Yes [] No    Mandy Dunne MA  04/30/24, 10:16 EDT

## 2024-05-01 RX ORDER — DEXTROAMPHETAMINE SACCHARATE, AMPHETAMINE ASPARTATE, DEXTROAMPHETAMINE SULFATE AND AMPHETAMINE SULFATE 5; 5; 5; 5 MG/1; MG/1; MG/1; MG/1
20 TABLET ORAL
Qty: 60 TABLET | Refills: 0 | Status: SHIPPED | OUTPATIENT
Start: 2024-05-01

## 2024-07-24 ENCOUNTER — OFFICE VISIT (OUTPATIENT)
Dept: INTERNAL MEDICINE | Facility: CLINIC | Age: 49
End: 2024-07-24
Payer: COMMERCIAL

## 2024-07-24 VITALS
HEIGHT: 64 IN | TEMPERATURE: 98 F | DIASTOLIC BLOOD PRESSURE: 64 MMHG | HEART RATE: 68 BPM | WEIGHT: 140.6 LBS | BODY MASS INDEX: 24.01 KG/M2 | OXYGEN SATURATION: 97 % | SYSTOLIC BLOOD PRESSURE: 108 MMHG

## 2024-07-24 DIAGNOSIS — Z00.00 HEALTHCARE MAINTENANCE: Primary | ICD-10-CM

## 2024-07-24 DIAGNOSIS — F90.2 ATTENTION DEFICIT HYPERACTIVITY DISORDER (ADHD), COMBINED TYPE: ICD-10-CM

## 2024-07-24 DIAGNOSIS — G43.E09 CHRONIC MIGRAINE WITH AURA: ICD-10-CM

## 2024-07-24 DIAGNOSIS — F41.9 ANXIETY: ICD-10-CM

## 2024-07-24 PROCEDURE — 99214 OFFICE O/P EST MOD 30 MIN: CPT | Performed by: INTERNAL MEDICINE

## 2024-07-24 PROCEDURE — 99396 PREV VISIT EST AGE 40-64: CPT | Performed by: INTERNAL MEDICINE

## 2024-07-24 RX ORDER — DEXTROAMPHETAMINE SACCHARATE, AMPHETAMINE ASPARTATE, DEXTROAMPHETAMINE SULFATE AND AMPHETAMINE SULFATE 5; 5; 5; 5 MG/1; MG/1; MG/1; MG/1
20 TABLET ORAL
Qty: 60 TABLET | Refills: 0 | Status: SHIPPED | OUTPATIENT
Start: 2024-07-24

## 2024-07-24 RX ORDER — RIMEGEPANT SULFATE 75 MG/75MG
1 TABLET, ORALLY DISINTEGRATING ORAL EVERY OTHER DAY
Qty: 16 TABLET | Refills: 5 | Status: SHIPPED | OUTPATIENT
Start: 2024-07-24

## 2024-07-24 RX ORDER — FLUOXETINE HYDROCHLORIDE 20 MG/1
20 CAPSULE ORAL DAILY
Qty: 90 CAPSULE | Refills: 1 | Status: SHIPPED | OUTPATIENT
Start: 2024-07-24

## 2024-07-24 NOTE — PROGRESS NOTES
Chief Complaint   Patient presents with    Annual Exam     Med check       Patient Name: Ruth Casillas    SUBJECTIVE    Ruth is a 48 y.o. female presenting for Annual Exam (Med check)      Ruth Casillas 48 y.o. female who presents for an Annual Wellness Visit.  she has a history of   Patient Active Problem List   Diagnosis    Anxiety    Attention deficit hyperactivity disorder (ADHD), combined type    Diarrhea    Rectal bleeding    Vaginal laceration, subsequent encounter    Atypical squamous cell changes of undetermined significance (ASCUS) on vaginal cytology with positive high risk human papilloma virus (HPV)   .  she has been doing well with new interval problems.      Health Habits:  Dental Exam. up to date  Eye Exam. up to date  Exercise: 4 times/week.  Current exercise activities include: aerobics, weights    Menstrual history:  Last pap date: 4/6/23  Abnormal pap? LSIL  Mammogram:utd  Dexa:n/a  Colonoscopy:cologuard done.  negative  Tob use:no  EtOH use:occ  Qualifies for lung Ca screening?no    Pt here for f/u.  LOV 1/10/24     Pt has chronic migraines.  pt ran out of nurtec and noticed that sx got worse off the med.  She needs a refill     Pt back on her prozac and doing well.     ADHD - on adderall 20mg bid. She is doing well on this.      The following portions of the patient's history were reviewed and updated as appropriate: allergies, current medications, past family history, past medical history, past social history, past surgical history and problem list.    Review of Systems   Constitutional: Negative.    HENT: Negative.     Eyes: Negative.    Respiratory: Negative.     Cardiovascular: Negative.    Gastrointestinal: Negative.    Endocrine: Negative.    Genitourinary: Negative.    Musculoskeletal: Negative.    Skin: Negative.    Allergic/Immunologic: Negative.    Neurological: Negative.    Hematological: Negative.    Psychiatric/Behavioral: Negative.     All other systems reviewed and are  "negative.      Patient has no known allergies.      Current Outpatient Medications:     amphetamine-dextroamphetamine (ADDERALL) 20 MG tablet, Take 1 tablet by mouth 2 (Two) Times a Day., Disp: 60 tablet, Rfl: 0    estradiol (ESTRACE) 0.1 MG/GM vaginal cream, Insert 1 g into the vagina 2 (Two) Times a Week., Disp: 45 g, Rfl: 6    FLUoxetine (PROzac) 20 MG capsule, Take 1 capsule by mouth Daily., Disp: 90 capsule, Rfl: 1    multivitamin with minerals tablet tablet, Take 1 tablet by mouth Daily., Disp: , Rfl:     Rimegepant Sulfate (Nurtec) 75 MG tablet dispersible tablet, Take 1 tablet by mouth Every Other Day., Disp: 16 tablet, Rfl: 5    tretinoin (RETIN-A) 0.025 % cream, Apply thin layer to face at bedtime for acne, Disp: 45 g, Rfl: 3    OBJECTIVE    /64 (BP Location: Left arm, Patient Position: Sitting, Cuff Size: Adult)   Pulse 68   Temp 98 °F (36.7 °C) (Infrared)   Ht 162.6 cm (64.02\")   Wt 63.8 kg (140 lb 9.6 oz)   SpO2 97%   BMI 24.12 kg/m²     Wt Readings from Last 3 Encounters:   07/24/24 63.8 kg (140 lb 9.6 oz)   01/10/24 64.4 kg (142 lb)   08/09/23 60 kg (132 lb 3.2 oz)     Temp Readings from Last 3 Encounters:   07/24/24 98 °F (36.7 °C) (Infrared)   01/10/24 98.6 °F (37 °C) (Infrared)   08/09/23 97.5 °F (36.4 °C) (Infrared)     BP Readings from Last 3 Encounters:   07/24/24 108/64   01/10/24 116/68   08/09/23 98/60     Pulse Readings from Last 3 Encounters:   07/24/24 68   01/10/24 87   08/09/23 80       Physical Exam  Vitals and nursing note reviewed.   Constitutional:       General: She is not in acute distress.     Appearance: She is well-developed.   HENT:      Head: Normocephalic and atraumatic.      Right Ear: External ear normal.      Left Ear: External ear normal.      Nose: Nose normal.   Eyes:      Conjunctiva/sclera: Conjunctivae normal.      Pupils: Pupils are equal, round, and reactive to light.   Cardiovascular:      Rate and Rhythm: Normal rate and regular rhythm.      Heart " sounds: Normal heart sounds.   Pulmonary:      Effort: Pulmonary effort is normal. No respiratory distress.      Breath sounds: Normal breath sounds. No wheezing.   Musculoskeletal:         General: Normal range of motion.      Cervical back: Normal range of motion and neck supple.      Comments: Normal gait   Skin:     General: Skin is warm and dry.   Neurological:      Mental Status: She is alert and oriented to person, place, and time.   Psychiatric:         Behavior: Behavior normal.         Thought Content: Thought content normal.         Judgment: Judgment normal.         Common labs          1/10/2024    09:30   Common Labs   Glucose 87    BUN 18    Creatinine 0.87    Sodium 137    Potassium 5.0    Chloride 100    Calcium 10.1    Total Protein 6.9    Albumin 4.9    Total Bilirubin 0.4    Alkaline Phosphatase 73    AST (SGOT) 27    ALT (SGPT) 24    WBC 5.27    Hemoglobin 14.3    Hematocrit 42.4    Platelets 322    Total Cholesterol 164    Triglycerides 59    HDL Cholesterol 72    LDL Cholesterol  80    Hemoglobin A1C 5.20           ASSESSMENT AND PLAN  Diagnoses and all orders for this visit:    1. Healthcare maintenance (Primary)    2. Chronic migraine with aura  -     Rimegepant Sulfate (Nurtec) 75 MG tablet dispersible tablet; Take 1 tablet by mouth Every Other Day.  Dispense: 16 tablet; Refill: 5    3. Attention deficit hyperactivity disorder (ADHD), combined type  -     amphetamine-dextroamphetamine (ADDERALL) 20 MG tablet; Take 1 tablet by mouth 2 (Two) Times a Day.  Dispense: 60 tablet; Refill: 0    4. Anxiety  -     FLUoxetine (PROzac) 20 MG capsule; Take 1 capsule by mouth Daily.  Dispense: 90 capsule; Refill: 1        Discussed healthy diet, exercise, cancer screening, immunizations, and preventive care.  Hm tab updated.  Encouraged seat belt use.  No texting while driving. Orders placed as below.     Encouraged covid vaccination if not already done.  Covid vaccination can be scheduled at  [email or web address]    continue current medications, continue current healthy lifestyle patterns, and return for routine annual checkups        Return in about 3 months (around 10/24/2024) for Recheck.

## 2024-09-09 DIAGNOSIS — F90.2 ATTENTION DEFICIT HYPERACTIVITY DISORDER (ADHD), COMBINED TYPE: ICD-10-CM

## 2024-09-10 RX ORDER — DEXTROAMPHETAMINE SACCHARATE, AMPHETAMINE ASPARTATE, DEXTROAMPHETAMINE SULFATE AND AMPHETAMINE SULFATE 5; 5; 5; 5 MG/1; MG/1; MG/1; MG/1
20 TABLET ORAL
Qty: 60 TABLET | Refills: 0 | Status: SHIPPED | OUTPATIENT
Start: 2024-09-10

## 2024-09-10 NOTE — TELEPHONE ENCOUNTER
Rx Refill Note  Requested Prescriptions     Pending Prescriptions Disp Refills    amphetamine-dextroamphetamine (ADDERALL) 20 MG tablet 60 tablet 0     Sig: Take 1 tablet by mouth 2 (Two) Times a Day.      Last office visit with prescribing clinician: 7/24/2024   Last telemedicine visit with prescribing clinician: Visit date not found   Next office visit with prescribing clinician: Visit date not found                         Would you like a call back once the refill request has been completed: [] Yes [] No    If the office needs to give you a call back, can they leave a voicemail: [] Yes [] No    Corina Martínez MA  09/10/24, 12:17 EDT

## 2024-09-20 ENCOUNTER — SPECIALTY PHARMACY (OUTPATIENT)
Dept: PHARMACY | Facility: TELEHEALTH | Age: 49
End: 2024-09-20
Payer: COMMERCIAL

## 2024-09-20 PROBLEM — G43.101 MIGRAINE WITH AURA AND WITH STATUS MIGRAINOSUS, NOT INTRACTABLE: Status: ACTIVE | Noted: 2024-09-20

## 2024-09-25 ENCOUNTER — TELEPHONE (OUTPATIENT)
Dept: INTERNAL MEDICINE | Facility: CLINIC | Age: 49
End: 2024-09-25

## 2024-09-25 NOTE — TELEPHONE ENCOUNTER
Caller: [facility]    Relationship to patient: Other    Best call back number: [phone]     Patient is needing: NEEDING TO RENEW PA FOR amphetamine-dextroamphetamine (ADDERALL) 20 MG tablet

## 2024-10-23 ENCOUNTER — SPECIALTY PHARMACY (OUTPATIENT)
Dept: PHARMACY | Facility: TELEHEALTH | Age: 49
End: 2024-10-23
Payer: COMMERCIAL

## 2024-10-23 NOTE — PROGRESS NOTES
"Specialty Pharmacy Refill Coordination Note     Ruth \"DALE\" is a 48 y.o. female contacted today regarding refills of  Nurtec specialty medication(s).    Reviewed and verified with patient:       Specialty medication(s) and dose(s) confirmed: yes    Refill Questions      Flowsheet Row Most Recent Value   Changes to allergies? No   Changes to medications? No   New conditions or infections since last clinic visit No   Unplanned office visit, urgent care, ED, or hospital admission in the last 4 weeks  No   How does patient/caregiver feel medication is working? Very good   Financial problems or insurance changes  No   Since the previous refill, were any specialty medication doses or scheduled injections missed or delayed?  No   Does this patient require a clinical escalation to a pharmacist? No            Delivery Questions      Flowsheet Row Most Recent Value   Delivery method UPS   Delivery address verified with patient/caregiver? Yes   Delivery address Home   Number of medications in delivery 1   Medication(s) being filled and delivered Rimegepant Sulfate (Nurtec)   Doses left of specialty medications a few   Copay verified? Yes   Copay amount $0   Copay form of payment No copayment ($0)   Ship Date 10/24   Delivery Date 10/25   Signature Required No                   Follow-up: 21 day(s)     Herminia Newby, Pharmacy Technician  Specialty Pharmacy Technician        "

## 2024-11-05 DIAGNOSIS — F90.2 ATTENTION DEFICIT HYPERACTIVITY DISORDER (ADHD), COMBINED TYPE: ICD-10-CM

## 2024-11-06 RX ORDER — DEXTROAMPHETAMINE SACCHARATE, AMPHETAMINE ASPARTATE, DEXTROAMPHETAMINE SULFATE AND AMPHETAMINE SULFATE 5; 5; 5; 5 MG/1; MG/1; MG/1; MG/1
20 TABLET ORAL
Qty: 60 TABLET | Refills: 0 | Status: SHIPPED | OUTPATIENT
Start: 2024-11-06

## 2024-11-06 NOTE — TELEPHONE ENCOUNTER
PDMP Needs Review   Rx Refill Note  Requested Prescriptions     Pending Prescriptions Disp Refills    amphetamine-dextroamphetamine (ADDERALL) 20 MG tablet 60 tablet 0     Sig: Take 1 tablet by mouth 2 (Two) Times a Day.      Last office visit with prescribing clinician: 7/24/2024   Last telemedicine visit with prescribing clinician: Visit date not found   Next office visit with prescribing clinician: Visit date not found                         Would you like a call back once the refill request has been completed: [] Yes [] No    If the office needs to give you a call back, can they leave a voicemail: [] Yes [] No    Chichi Ojeda MA  11/06/24, 09:10 EST

## 2024-11-20 ENCOUNTER — SPECIALTY PHARMACY (OUTPATIENT)
Dept: PHARMACY | Facility: TELEHEALTH | Age: 49
End: 2024-11-20
Payer: COMMERCIAL

## 2024-11-20 NOTE — PROGRESS NOTES
"Specialty Pharmacy Refill Coordination Note     Ruth \"DALE\" is a 48 y.o. female contacted today regarding refills of  Nurtec specialty medication(s).    Reviewed and verified with patient:       Specialty medication(s) and dose(s) confirmed: yes    Refill Questions      Flowsheet Row Most Recent Value   Changes to allergies? No   Changes to medications? No   New conditions or infections since last clinic visit No   Unplanned office visit, urgent care, ED, or hospital admission in the last 4 weeks  No   How does patient/caregiver feel medication is working? Very good   Financial problems or insurance changes  No   Since the previous refill, were any specialty medication doses or scheduled injections missed or delayed?  No   Does this patient require a clinical escalation to a pharmacist? No            Delivery Questions      Flowsheet Row Most Recent Value   Delivery method UPS   Delivery address verified with patient/caregiver? Yes   Delivery address Home   Number of medications in delivery 1   Medication(s) being filled and delivered Rimegepant Sulfate (Nurtec)   Doses left of specialty medications 3 tablets left   Copay verified? Yes   Copay amount $0.00   Copay form of payment No copayment ($0)   Ship Date 11/20   Delivery Date 11/21   Signature Required No                   Follow-up: 21 day(s)     Riri Chilel, Pharmacy Technician  Specialty Pharmacy Technician        "

## 2024-12-26 ENCOUNTER — SPECIALTY PHARMACY (OUTPATIENT)
Dept: PHARMACY | Facility: TELEHEALTH | Age: 49
End: 2024-12-26
Payer: COMMERCIAL

## 2024-12-26 DIAGNOSIS — F90.2 ATTENTION DEFICIT HYPERACTIVITY DISORDER (ADHD), COMBINED TYPE: ICD-10-CM

## 2024-12-26 RX ORDER — DEXTROAMPHETAMINE SACCHARATE, AMPHETAMINE ASPARTATE, DEXTROAMPHETAMINE SULFATE AND AMPHETAMINE SULFATE 5; 5; 5; 5 MG/1; MG/1; MG/1; MG/1
20 TABLET ORAL
Qty: 60 TABLET | Refills: 0 | Status: CANCELLED | OUTPATIENT
Start: 2024-12-26

## 2024-12-26 NOTE — PROGRESS NOTES
"Specialty Pharmacy Refill Coordination Note     Ruth \"Raymon\" is a 49 y.o. female contacted today regarding refills of  Nurtec specialty medication(s).    Reviewed and verified with patient:       Specialty medication(s) and dose(s) confirmed: yes    Refill Questions      Flowsheet Row Most Recent Value   Changes to allergies? No   Changes to medications? No   New conditions or infections since last clinic visit No   Unplanned office visit, urgent care, ED, or hospital admission in the last 4 weeks  No   How does patient/caregiver feel medication is working? Very good   Financial problems or insurance changes  No   Since the previous refill, were any specialty medication doses or scheduled injections missed or delayed?  No   Does this patient require a clinical escalation to a pharmacist? No            Delivery Questions      Flowsheet Row Most Recent Value   Delivery method UPS   Delivery address verified with patient/caregiver? Yes   Delivery address Home   Number of medications in delivery 1   Medication(s) being filled and delivered Rimegepant Sulfate (Nurtec)   Doses left of specialty medications 3 tablets left   Copay verified? Yes   Copay amount $0.00   Copay form of payment No copayment ($0)   Ship Date 12/26   Delivery Date 12/27   Signature Required No                   Follow-up: 21 day(s)     Herminia Newby, Pharmacy Technician  Specialty Pharmacy Technician        "

## 2024-12-26 NOTE — TELEPHONE ENCOUNTER
PDMP Needs Review   Rx Refill Note  Requested Prescriptions     Pending Prescriptions Disp Refills    amphetamine-dextroamphetamine (ADDERALL) 20 MG tablet 60 tablet 0     Sig: Take 1 tablet by mouth 2 (Two) Times a Day.      Last office visit with prescribing clinician: 7/24/2024   Last telemedicine visit with prescribing clinician: Visit date not found   Next office visit with prescribing clinician: Visit date not found                         Would you like a call back once the refill request has been completed: [] Yes [] No    If the office needs to give you a call back, can they leave a voicemail: [] Yes [] No    Chichi Ojeda MA  12/26/24, 11:21 EST

## 2025-01-02 ENCOUNTER — TRANSCRIBE ORDERS (OUTPATIENT)
Dept: ADMINISTRATIVE | Facility: HOSPITAL | Age: 50
End: 2025-01-02
Payer: COMMERCIAL

## 2025-01-02 DIAGNOSIS — Z12.31 BREAST CANCER SCREENING BY MAMMOGRAM: Primary | ICD-10-CM

## 2025-01-04 DIAGNOSIS — F90.2 ATTENTION DEFICIT HYPERACTIVITY DISORDER (ADHD), COMBINED TYPE: ICD-10-CM

## 2025-01-07 RX ORDER — DEXTROAMPHETAMINE SACCHARATE, AMPHETAMINE ASPARTATE, DEXTROAMPHETAMINE SULFATE AND AMPHETAMINE SULFATE 5; 5; 5; 5 MG/1; MG/1; MG/1; MG/1
20 TABLET ORAL
Qty: 60 TABLET | Refills: 0 | Status: SHIPPED | OUTPATIENT
Start: 2025-01-07

## 2025-01-15 ENCOUNTER — OFFICE VISIT (OUTPATIENT)
Dept: INTERNAL MEDICINE | Facility: CLINIC | Age: 50
End: 2025-01-15
Payer: COMMERCIAL

## 2025-01-15 VITALS
HEART RATE: 80 BPM | DIASTOLIC BLOOD PRESSURE: 78 MMHG | WEIGHT: 145 LBS | OXYGEN SATURATION: 99 % | BODY MASS INDEX: 24.87 KG/M2 | SYSTOLIC BLOOD PRESSURE: 124 MMHG | TEMPERATURE: 97.7 F

## 2025-01-15 DIAGNOSIS — F90.2 ATTENTION DEFICIT HYPERACTIVITY DISORDER (ADHD), COMBINED TYPE: Primary | ICD-10-CM

## 2025-01-15 DIAGNOSIS — G43.E09 CHRONIC MIGRAINE WITH AURA: ICD-10-CM

## 2025-01-15 PROCEDURE — 99214 OFFICE O/P EST MOD 30 MIN: CPT | Performed by: INTERNAL MEDICINE

## 2025-01-15 RX ORDER — DEXTROAMPHETAMINE SACCHARATE, AMPHETAMINE ASPARTATE, DEXTROAMPHETAMINE SULFATE AND AMPHETAMINE SULFATE 5; 5; 5; 5 MG/1; MG/1; MG/1; MG/1
20 TABLET ORAL
Qty: 60 TABLET | Refills: 0 | Status: SHIPPED | OUTPATIENT
Start: 2025-01-15

## 2025-01-15 RX ORDER — RIZATRIPTAN BENZOATE 10 MG/1
10 TABLET, ORALLY DISINTEGRATING ORAL ONCE AS NEEDED
Qty: 9 TABLET | Refills: 0 | Status: SHIPPED | OUTPATIENT
Start: 2025-01-15

## 2025-01-15 NOTE — PROGRESS NOTES
Ruth Casillas is a 49 y.o. female, who presents with a chief complaint of   Chief Complaint   Patient presents with    ADHD     Med check           HPI   Pt here for f/u.    Adhd - on adderall and pt doing well.       Migraine headaches - doing much better with nurtec prophylaxis.  She is still having a bad headache 1-2 times a month.  She is taking excedrin every day or every other day.        The following portions of the patient's history were reviewed and updated as appropriate: allergies, current medications, past family history, past medical history, past social history, past surgical history and problem list.    Allergies: Patient has no known allergies.    Review of Systems   Constitutional: Negative.    HENT: Negative.     Eyes: Negative.    Respiratory: Negative.     Cardiovascular: Negative.    Gastrointestinal: Negative.    Endocrine: Negative.    Genitourinary: Negative.    Musculoskeletal: Negative.    Skin: Negative.    Allergic/Immunologic: Negative.    Neurological: Negative.    Hematological: Negative.    Psychiatric/Behavioral: Negative.     All other systems reviewed and are negative.            Wt Readings from Last 3 Encounters:   01/15/25 65.8 kg (145 lb)   07/24/24 63.8 kg (140 lb 9.6 oz)   01/10/24 64.4 kg (142 lb)     Temp Readings from Last 3 Encounters:   01/15/25 97.7 °F (36.5 °C) (Infrared)   07/24/24 98 °F (36.7 °C) (Infrared)   01/10/24 98.6 °F (37 °C) (Infrared)     BP Readings from Last 3 Encounters:   01/15/25 124/78   07/24/24 108/64   01/10/24 116/68     Pulse Readings from Last 3 Encounters:   01/15/25 80   07/24/24 68   01/10/24 87     Body mass index is 24.87 kg/m².  SpO2 Readings from Last 3 Encounters:   01/15/25 99%   07/24/24 97%   01/10/24 99%          Physical Exam  Vitals and nursing note reviewed.   Constitutional:       General: She is not in acute distress.     Appearance: She is well-developed.   HENT:      Head: Normocephalic and atraumatic.      Right  Ear: External ear normal.      Left Ear: External ear normal.      Nose: Nose normal.   Eyes:      Conjunctiva/sclera: Conjunctivae normal.      Pupils: Pupils are equal, round, and reactive to light.   Cardiovascular:      Rate and Rhythm: Normal rate and regular rhythm.      Heart sounds: Normal heart sounds.   Pulmonary:      Effort: Pulmonary effort is normal. No respiratory distress.      Breath sounds: Normal breath sounds. No wheezing.   Musculoskeletal:         General: Normal range of motion.      Cervical back: Normal range of motion and neck supple.      Comments: Normal gait   Skin:     General: Skin is warm and dry.   Neurological:      Mental Status: She is alert and oriented to person, place, and time.   Psychiatric:         Behavior: Behavior normal.         Thought Content: Thought content normal.         Judgment: Judgment normal.         Results for orders placed or performed in visit on 01/10/24   Cologuard - Stool, Per Rectum    Collection Time: 02/14/24  9:25 AM    Specimen: Per Rectum; Stool   Result Value Ref Range    Cologuard Negative Negative     Result Review :                  Assessment and Plan    Diagnoses and all orders for this visit:    1. Attention deficit hyperactivity disorder (ADHD), combined type (Primary)  -     amphetamine-dextroamphetamine (ADDERALL) 20 MG tablet; Take 1 tablet by mouth 2 (Two) Times a Day.  Dispense: 60 tablet; Refill: 0    2. Chronic migraine with aura  -     rimegepant sulfate (Nurtec) 75 MG tablet; Take 1 tablet by mouth Every Other Day.  Dispense: 16 tablet; Refill: 5  -     Ambulatory Referral to Neurology  -     rizatriptan MLT (Maxalt-MLT) 10 MG disintegrating tablet; Place 1 tablet on the tongue 1 (One) Time As Needed for Migraine. May repeat in 2 hours if needed  Dispense: 9 tablet; Refill: 0     Stop excedrin.  Discussed medcation over use headache.       Last pap showed LSIL.  Encouraged pt to make f/u appt with gyn       BMI is within normal  parameters. No other follow-up for BMI required.             Outpatient Medications Prior to Visit   Medication Sig Dispense Refill    estradiol (ESTRACE) 0.1 MG/GM vaginal cream Insert 1 g into the vagina 2 (Two) Times a Week. 45 g 6    FLUoxetine (PROzac) 20 MG capsule Take 1 capsule by mouth Daily. 90 capsule 1    multivitamin with minerals tablet tablet Take 1 tablet by mouth Daily.      tretinoin (RETIN-A) 0.025 % cream Apply thin layer to face at bedtime for acne 45 g 3    amphetamine-dextroamphetamine (ADDERALL) 20 MG tablet Take 1 tablet by mouth 2 (Two) Times a Day. 60 tablet 0    rimegepant sulfate (Nurtec) 75 MG tablet Take 1 tablet by mouth Every Other Day. 16 tablet 5     No facility-administered medications prior to visit.     New Medications Ordered This Visit   Medications    rimegepant sulfate (Nurtec) 75 MG tablet     Sig: Take 1 tablet by mouth Every Other Day.     Dispense:  16 tablet     Refill:  5     Pt has been on topamax and feels bad on med    rizatriptan MLT (Maxalt-MLT) 10 MG disintegrating tablet     Sig: Place 1 tablet on the tongue 1 (One) Time As Needed for Migraine. May repeat in 2 hours if needed     Dispense:  9 tablet     Refill:  0    amphetamine-dextroamphetamine (ADDERALL) 20 MG tablet     Sig: Take 1 tablet by mouth 2 (Two) Times a Day.     Dispense:  60 tablet     Refill:  0     [unfilled]  Medications Discontinued During This Encounter   Medication Reason    rimegepant sulfate (Nurtec) 75 MG tablet Reorder    amphetamine-dextroamphetamine (ADDERALL) 20 MG tablet Reorder         No follow-ups on file.    Patient was given instructions and counseling regarding her condition or for health maintenance advice. Please see specific information pulled into the AVS if appropriate.

## 2025-01-20 ENCOUNTER — SPECIALTY PHARMACY (OUTPATIENT)
Dept: PHARMACY | Facility: TELEHEALTH | Age: 50
End: 2025-01-20
Payer: COMMERCIAL

## 2025-01-20 NOTE — PROGRESS NOTES
"Specialty Pharmacy Refill Coordination Note     Ruth \"Raymon\" is a 49 y.o. female contacted today regarding refills of  nurtec and maxalt specialty medication(s).    Reviewed and verified with patient:  Allergies       Specialty medication(s) and dose(s) confirmed: yes    Refill Questions      Flowsheet Row Most Recent Value   Changes to allergies? No   Changes to medications? No   New conditions or infections since last clinic visit No   Unplanned office visit, urgent care, ED, or hospital admission in the last 4 weeks  No   How does patient/caregiver feel medication is working? Very good   Financial problems or insurance changes  No   Since the previous refill, were any specialty medication doses or scheduled injections missed or delayed?  No   Does this patient require a clinical escalation to a pharmacist? No            Delivery Questions      Flowsheet Row Most Recent Value   Delivery method UPS   Delivery address verified with patient/caregiver? Yes   Delivery address Home   Number of medications in delivery 2   Medication(s) being filled and delivered Rimegepant Sulfate (Nurtec), Rizatriptan Benzoate (MAXALT-MLT)   Doses left of specialty medications 3 tablets left   Copay verified? Yes   Copay amount 15.00 rizatriptan   Copay form of payment Credit/debit on file   Ship Date 01/21/25   Delivery Date 01/22/25   Signature Required No                   Follow-up: 21 day(s)     Herminia Newby, Pharmacy Technician  Specialty Pharmacy Technician        "

## 2025-02-05 ENCOUNTER — OFFICE VISIT (OUTPATIENT)
Dept: OBSTETRICS AND GYNECOLOGY | Facility: CLINIC | Age: 50
End: 2025-02-05
Payer: COMMERCIAL

## 2025-02-05 VITALS
DIASTOLIC BLOOD PRESSURE: 66 MMHG | SYSTOLIC BLOOD PRESSURE: 118 MMHG | HEIGHT: 64 IN | BODY MASS INDEX: 24.75 KG/M2 | WEIGHT: 145 LBS

## 2025-02-05 DIAGNOSIS — Z11.51 SCREENING FOR HUMAN PAPILLOMAVIRUS (HPV): ICD-10-CM

## 2025-02-05 DIAGNOSIS — Z01.419 ROUTINE GYNECOLOGICAL EXAMINATION: Primary | ICD-10-CM

## 2025-02-05 RX ORDER — ESTRADIOL 0.1 MG/G
1 CREAM VAGINAL 2 TIMES WEEKLY
Qty: 45 G | Refills: 6 | Status: SHIPPED | OUTPATIENT
Start: 2025-02-06

## 2025-02-05 NOTE — PROGRESS NOTES
GYN Annual Exam     CC- Here for annual exam.     Ruth Casillas is a 49 y.o. female previous pt not seen in the last 3 years who presents for annual well woman exam. She is an established patient but new to me.  She had a TLH/BS with OC  In  for DUB. She had a partial cuff dehiscence and repair in 2019.  She is not taking vaginal estrogen, ran out of RX.     Her pap smear in  was abnormal- LGSIL. She is s/p colpo and the recommendation was repeat pap in 1 year.     She does not do SBE.     She quit smoking.        OB History          2    Para   2    Term   2            AB        Living   2         SAB        IAB        Ectopic        Molar        Multiple        Live Births              Obstetric Comments   2 C/S               Menarche: 14  Current contraception: status post hysterectomy - TLH/ BS with OC  History of abnormal Pap smear: yes,  and 2019  History of abnormal mammogram: no  Family history of uterine, colon or ovarian cancer: no  Family history of breast cancer: no  H/o STDs: none  Gardasil: none  Last pap:  LGSIL and 2019- ASCUS + HPV  ALFONSO: none  Late cuff dehiscence   Colonoscopy: Cologuard      Health Maintenance   Topic Date Due    Pneumococcal Vaccine 0-64 (1 of 2 - PCV) Never done    Annual Gynecologic Pelvic and Breast Exam  2024    ZOSTER VACCINE (1 of 2) Never done    COVID-19 Vaccine (3 - - season) 2025 (Originally 2024)    ANNUAL PHYSICAL  2025    MAMMOGRAM  10/25/2025    PAP SMEAR  2026    COLORECTAL CANCER SCREENING  2027    TDAP/TD VACCINES (3 - Td or Tdap) 01/10/2034    HEPATITIS C SCREENING  Completed    INFLUENZA VACCINE  Completed       Past Medical History:   Diagnosis Date    ADHD 2016    predom inattentive type. [facility].    Allergic rhinitis     Anemia     Anxiety     ASCUS with positive high risk HPV cervical 2019    Bloating     Cancer     melanoma in situ  on back    Chronic nonintractable headache     Depression     Dysmenorrhea     Dyspareunia, female     Fatigue     Menorrhagia     Migraines     Rectal bleeding 2018    Vaginal atrophy     Vaginal laceration 2019    ADMITTED TO [facility]    Vasovagal syncope 2016       Past Surgical History:   Procedure Laterality Date     SECTION N/A 2000    DR. ELIJAH HERNANDEZ AT [facility]     SECTION N/A 2003    DR. ELIJAH HERNANDEZ AT [facility]    COLPOSCOPY  2019    [facility] Procedure.    DIAGNOSTIC LAPAROSCOPY N/A 2019    Procedure: DIAGNOSTIC LAPAROSCOPY;  Surgeon: Brenda Almazan MD;  Location: [facility] OR;  Service: Obstetrics/Gynecology    EXAM UNDER ANESTHESIA N/A 2019    Procedure: EXAM UNDER ANESTHESIA;  Surgeon: Brenda Almazan MD;  Location: [facility] OR;  Service: Obstetrics/Gynecology    HYSTERECTOMY N/A 2015    H/BS with OC, DR. BRENDA ALMAZAN AT [facility]    SKIN CANCER EXCISION      TUBAL ABDOMINAL LIGATION Bilateral          Current Outpatient Medications:     amphetamine-dextroamphetamine (ADDERALL) 20 MG tablet, Take 1 tablet by mouth 2 (Two) Times a Day., Disp: 60 tablet, Rfl: 0    FLUoxetine (PROzac) 20 MG capsule, Take 1 capsule by mouth Daily., Disp: 90 capsule, Rfl: 1    multivitamin with minerals tablet tablet, Take 1 tablet by mouth Daily., Disp: , Rfl:     rimegepant sulfate (Nurtec) 75 MG tablet, Take 1 tablet by mouth Every Other Day., Disp: 16 tablet, Rfl: 5    rizatriptan MLT (Maxalt-MLT) 10 MG disintegrating tablet, Place 1 tablet on the tongue 1 (One) Time As Needed for Migraine. May repeat in 2 hours if needed, Disp: 9 tablet, Rfl: 0    tretinoin (RETIN-A) 0.025 % cream, Apply thin layer to face at bedtime for acne, Disp: 45 g, Rfl: 3    estradiol (ESTRACE) 0.1 MG/GM vaginal cream, Insert 1 g into the vagina 2 (Two) Times a Week. (Patient not taking: Reported on 2025), Disp: 45 g, Rfl: 6    No Known Allergies    Social History     Tobacco  "Use    Smoking status: Light Smoker     Current packs/day: 0.00     Average packs/day: 0.5 packs/day for 20.0 years (10.0 ttl pk-yrs)     Types: Cigarettes     Start date:      Last attempt to quit:      Years since quittin.0    Smokeless tobacco: Never    Tobacco comments:     quit for one month    Vaping Use    Vaping status: Never Used   Substance Use Topics    Alcohol use: Yes     Alcohol/week: 1.0 standard drink of alcohol     Types: 1 Shots of liquor per week     Comment: MODERATE AMT    Drug use: No       Family History   Problem Relation Age of Onset    Emphysema Father     Depression Father     Anxiety disorder Father     Immunodeficiency Mother     Severe combined immunodeficiency Mother     Depression Sister     Anxiety disorder Sister     Depression Daughter     Depression Maternal Grandfather     Anxiety disorder Maternal Grandfather     Breast cancer Neg Hx     Ovarian cancer Neg Hx     Colon cancer Neg Hx     Deep vein thrombosis Neg Hx     Pulmonary embolism Neg Hx     Uterine cancer Neg Hx        Review of Systems   Constitutional: Negative.  Negative for fever.   Respiratory: Negative.     Cardiovascular: Negative.    Gastrointestinal: Negative.    Genitourinary: Negative.    Musculoskeletal: Negative.    Neurological: Negative.    Psychiatric/Behavioral: Negative.         /66   Ht 162.6 cm (64.02\")   Wt 65.8 kg (145 lb)   BMI 24.88 kg/m²     Physical Exam   Constitutional: She is oriented to person, place, and time. She appears well-developed.   HENT:   Head: Normocephalic and atraumatic.   Eyes: Conjunctivae are normal. No scleral icterus.   Neck: No thyromegaly present.   Cardiovascular: Normal rate, regular rhythm and normal heart sounds.   Pulmonary/Chest: Effort normal and breath sounds normal. Right breast exhibits no inverted nipple, no mass, no nipple discharge, no skin change and no tenderness. Left breast exhibits no inverted nipple, no mass, no nipple discharge, no " skin change and no tenderness.   Abdominal: Soft. Normal appearance.   Genitourinary:    Rectum normal.      Pelvic exam was performed with patient supine.   There is no rash, tenderness or lesion on the right labia. There is no rash, tenderness or lesion on the left labia. Right adnexum displays no mass, no tenderness and no fullness. Left adnexum displays no mass, no tenderness and no fullness.    No vaginal discharge, erythema, tenderness or bleeding.   No erythema, tenderness or bleeding in the vagina.    No foreign body in the vagina.      No signs of injury in the vagina.      Genitourinary Comments: Uterus and cervix absent  Normal cuff  Mild atrophy noted     Musculoskeletal: Normal range of motion.   Neurological: She is alert and oriented to person, place, and time.   Skin: Skin is warm and dry.   Psychiatric: Her behavior is normal. Mood normal.   Nursing note and vitals reviewed.         Assessment/Plan    1) GYN HM: check pap/HPV.   SBE demonstrated and encouraged. MMG scheduled.   2) H/O abnormal pap: LGSIL 4/23. Colpo 5/23 rec repeat pap/HPV in 1 year. Check today as pt did not follow up as recommended.   3) Contraception: s/p TLH  4) Body mass index is 24.88 kg/m².  Diet and Exercise discussed  6) Smoking Status: quit !   7) Cscope- Cologuard 2/24   8) Vaginal atrophy- Restart vaginal E2 twice a week, RX sent..     Parts of this document have been copied or forwarded from her previous visits and have been reviewed, updated and edited as indicated.     Follow up prn or 1 year annual       Diagnoses and all orders for this visit:    1. Routine gynecological examination (Primary)  -     POC Urinalysis Dipstick  -     IGP, Apt HPV,rfx 16 / 18,45    2. Screening for human papillomavirus (HPV)  -     IGP, Apt HPV,rfx 16 / 18,45          HOLA Alcocer  2/5/2025  10:32 EST

## 2025-02-10 LAB
CYTOLOGIST CVX/VAG CYTO: NORMAL
CYTOLOGY CVX/VAG DOC CYTO: NORMAL
CYTOLOGY CVX/VAG DOC THIN PREP: NORMAL
DX ICD CODE: NORMAL
HPV I/H RISK 4 DNA CVX QL PROBE+SIG AMP: NEGATIVE
Lab: NORMAL
OTHER STN SPEC: NORMAL
SERVICE CMNT-IMP: NORMAL
STAT OF ADQ CVX/VAG CYTO-IMP: NORMAL

## 2025-02-26 DIAGNOSIS — F90.2 ATTENTION DEFICIT HYPERACTIVITY DISORDER (ADHD), COMBINED TYPE: ICD-10-CM

## 2025-02-26 RX ORDER — DEXTROAMPHETAMINE SACCHARATE, AMPHETAMINE ASPARTATE, DEXTROAMPHETAMINE SULFATE AND AMPHETAMINE SULFATE 5; 5; 5; 5 MG/1; MG/1; MG/1; MG/1
20 TABLET ORAL
Qty: 60 TABLET | Refills: 0 | Status: CANCELLED | OUTPATIENT
Start: 2025-02-26

## 2025-02-27 ENCOUNTER — SPECIALTY PHARMACY (OUTPATIENT)
Dept: PHARMACY | Facility: TELEHEALTH | Age: 50
End: 2025-02-27
Payer: COMMERCIAL

## 2025-02-27 NOTE — PROGRESS NOTES
" Specialty Pharmacy Patient Management Program  Call Center Refill Outreach      Ragen \"Raymon\" is a 49 y.o. female contacted today regarding refills of her medication(s).    Specialty medication(s) and dose(s) confirmed: Nurtec ODT 75 mg  Other medications being refilled: none    Refill Questions      Flowsheet Row Most Recent Value   Changes to allergies? No   Changes to medications? No   New conditions or infections since last clinic visit No   Unplanned office visit, urgent care, ED, or hospital admission in the last 4 weeks  No   How does patient/caregiver feel medication is working? Good   Financial problems or insurance changes  No   Since the previous refill, were any specialty medication doses or scheduled injections missed or delayed?  No   Does this patient require a clinical escalation to a pharmacist? No            Delivery Questions      Flowsheet Row Most Recent Value   Delivery method UPS   Delivery address verified with patient/caregiver? Yes   Delivery address Home   Number of medications in delivery 1   Medication(s) being filled and delivered Rimegepant Sulfate (NURTEC-ODT)   Doses left of specialty medications coulpe left   Copay verified? Yes   Copay amount $0   Copay form of payment No copayment ($0)   Delivery Date Selection 02/28/25   Signature Required No                   Follow-up: 25 days     Mir Haque [facility]  Specialty Pharmacist  2/27/2025  12:10 EST   "

## 2025-02-27 NOTE — TELEPHONE ENCOUNTER
PDMP Needs Review   Rx Refill Note  Requested Prescriptions     Pending Prescriptions Disp Refills    amphetamine-dextroamphetamine (ADDERALL) 20 MG tablet 60 tablet 0     Sig: Take 1 tablet by mouth 2 (Two) Times a Day.      Last office visit with prescribing clinician: 1/15/2025   Last telemedicine visit with prescribing clinician: Visit date not found   Next office visit with prescribing clinician: Visit date not found                         Would you like a call back once the refill request has been completed: [] Yes [] No    If the office needs to give you a call back, can they leave a voicemail: [] Yes [] No    Chichi Ojeda MA  02/27/25, 10:57 EST

## 2025-02-28 DIAGNOSIS — F90.2 ATTENTION DEFICIT HYPERACTIVITY DISORDER (ADHD), COMBINED TYPE: ICD-10-CM

## 2025-02-28 RX ORDER — DEXTROAMPHETAMINE SACCHARATE, AMPHETAMINE ASPARTATE, DEXTROAMPHETAMINE SULFATE AND AMPHETAMINE SULFATE 5; 5; 5; 5 MG/1; MG/1; MG/1; MG/1
20 TABLET ORAL
Qty: 60 TABLET | Refills: 0 | Status: CANCELLED | OUTPATIENT
Start: 2025-02-26

## 2025-03-05 ENCOUNTER — HOSPITAL ENCOUNTER (OUTPATIENT)
Dept: MAMMOGRAPHY | Facility: HOSPITAL | Age: 50
Discharge: HOME | End: 2025-03-05
Admitting: INTERNAL MEDICINE
Payer: COMMERCIAL

## 2025-03-05 DIAGNOSIS — Z12.31 BREAST CANCER SCREENING BY MAMMOGRAM: ICD-10-CM

## 2025-03-05 PROCEDURE — 77063 BREAST TOMOSYNTHESIS BI: CPT

## 2025-03-05 PROCEDURE — 77067 SCR MAMMO BI INCL CAD: CPT

## 2025-03-07 DIAGNOSIS — F90.2 ATTENTION DEFICIT HYPERACTIVITY DISORDER (ADHD), COMBINED TYPE: ICD-10-CM

## 2025-03-07 RX ORDER — DEXTROAMPHETAMINE SACCHARATE, AMPHETAMINE ASPARTATE, DEXTROAMPHETAMINE SULFATE AND AMPHETAMINE SULFATE 5; 5; 5; 5 MG/1; MG/1; MG/1; MG/1
20 TABLET ORAL
Qty: 60 TABLET | Refills: 0 | Status: SHIPPED | OUTPATIENT
Start: 2025-03-07

## 2025-03-07 NOTE — TELEPHONE ENCOUNTER
Rx Refill Note  Requested Prescriptions     Pending Prescriptions Disp Refills    amphetamine-dextroamphetamine (ADDERALL) 20 MG tablet 60 tablet 0     Sig: Take 1 tablet by mouth 2 (Two) Times a Day.      Last office visit with prescribing clinician: 1/15/2025   Last telemedicine visit with prescribing clinician: Visit date not found   Next office visit with prescribing clinician: Visit date not found                         Would you like a call back once the refill request has been completed: [] Yes [] No    If the office needs to give you a call back, can they leave a voicemail: [] Yes [] No    Kingsley Portillo MA  03/07/25, 11:06 EST

## 2025-03-07 NOTE — TELEPHONE ENCOUNTER
"Caller: Ruth Casillas \"Raymon\"    Relationship: Self    Best call back number: [phone]     Requested Prescriptions:   Requested Prescriptions     Pending Prescriptions Disp Refills    amphetamine-dextroamphetamine (ADDERALL) 20 MG tablet 60 tablet 0     Sig: Take 1 tablet by mouth 2 (Two) Times a Day.        Pharmacy where request should be sent: [facility]     Last office visit with prescribing clinician: 1/15/2025   Last telemedicine visit with prescribing clinician: Visit date not found   Next office visit with prescribing clinician: Visit date not found     Additional details provided by patient:     Does the patient have less than a 3 day supply:  [] Yes  [x] No    Would you like a call back once the refill request has been completed: [] Yes [x] No    If the office needs to give you a call back, can they leave a voicemail: [] Yes [x] No    Maria Hickey Rep   03/07/25 09:52 EST   "

## 2025-03-13 ENCOUNTER — OFFICE VISIT (OUTPATIENT)
Dept: NEUROLOGY | Facility: CLINIC | Age: 50
End: 2025-03-13
Payer: COMMERCIAL

## 2025-03-13 VITALS
OXYGEN SATURATION: 98 % | WEIGHT: 142.4 LBS | BODY MASS INDEX: 24.43 KG/M2 | DIASTOLIC BLOOD PRESSURE: 68 MMHG | HEART RATE: 77 BPM | SYSTOLIC BLOOD PRESSURE: 120 MMHG

## 2025-03-13 DIAGNOSIS — R51.9 CHRONIC DAILY HEADACHE: ICD-10-CM

## 2025-03-13 DIAGNOSIS — G43.101 MIGRAINE WITH AURA AND WITH STATUS MIGRAINOSUS, NOT INTRACTABLE: Primary | ICD-10-CM

## 2025-03-13 NOTE — PROGRESS NOTES
Notes by [facility]:  Ms Casillas is here today as a referral from Dr Wellington. She states that she experiences a headache daily and a migraine about 5 times per month.      Subjective:     Dear Dr. Wellington, thank you for referring your patient for neurological consultation.  As you know, she is a 49-year-old woman with headaches since her teenage years.  These have multiple migrainous characteristics including throbbing quality, nausea, light sensitivity, sound sensitivity, and occasionally are disabling with significant movement sensitivity.  Over time these become more frequent.  She is having nearly daily headaches now punctuated by about 5 more severe headaches per month that she describes as migraines.  She uses Excedrin almost on a daily basis for these.  She was recently prescribed Nurtec and takes it every other day and finds this fairly helpful in reducing her migraine days.  She was recently prescribed Maxalt which she has used only once but found it helpful.  She was on Topamax in the past but had too many paresthesias and other side effects to continue using it.  Patient ID: Ruth Casillas is a 49 y.o. female.    History of Present Illness  The following portions of the patient's history were reviewed and updated as appropriate: allergies, current medications, past family history, past medical history, past social history, past surgical history, and problem list.    Review of Systems   Constitutional:  Negative for activity change, appetite change and fatigue.   HENT:  Negative for facial swelling, trouble swallowing and voice change.    Eyes:  Positive for photophobia. Negative for pain and visual disturbance.   Respiratory:  Negative for chest tightness, shortness of breath and wheezing.    Cardiovascular:  Negative for chest pain, palpitations and leg swelling.   Gastrointestinal:  Positive for nausea. Negative for abdominal pain and vomiting.   Endocrine: Negative for cold intolerance and heat intolerance.  "  Musculoskeletal:  Positive for neck pain. Negative for arthralgias, back pain, gait problem, joint swelling, myalgias and neck stiffness.   Neurological:  Positive for dizziness, facial asymmetry (feels like it) and headaches. Negative for tremors, seizures, syncope, speech difficulty, weakness, light-headedness and numbness.   Hematological:  Does not bruise/bleed easily.   Psychiatric/Behavioral:  Positive for decreased concentration and sleep disturbance. Negative for agitation, behavioral problems, confusion, dysphoric mood, hallucinations, self-injury and suicidal ideas. The patient is not nervous/anxious and is not hyperactive.         Objective:    Neurological Exam   Awake alert pleasant cooperative oriented in all spheres with fluent speech and normal speech comprehension.    Cranial nerves II through XII normal and symmetric.    Motor exam reveals normal symmetric tone bulk and power throughout without drift or spasticity.  No adventitious movements.    The sensory exam reveals normal and symmetric sensation to light touch, temperature, vibration throughout.    Coordination testing reveals smooth and accurate finger-nose-finger bilaterally, normal heel-to-shin bilaterally and normal rhythmic rapid alternating movements.  Romberg testing is negative.    Tendon reflexes are 2+ and symmetric throughout including preserved ankle jerks bilaterally.  No ankle clonus.    Physical Exam    Assessment/Plan:     Diagnoses and all orders for this visit:    1. Migraine with aura and with status migrainosus, not intractable (Primary)    2. Chronic daily headache     49-year-old woman with a long history of headaches satisfying diagnostic criteria for migraine.  The biggest issue for her currently is the frequency of her \"regular\" headaches.  I think this is primarily driven by rebound and we had a long discussion about tapering and discontinuing her Excedrin and tapering her other caffeine containing products.  Talked " about multiple specific ways to accomplish this (including a very gradual taper over a weeks as well as a more abrupt approach while using her Nurtec on a daily basis for about 2 weeks).  She is already appropriately on Nurtec prophylaxis and finds it helpful for her more severe migraine days, and she is already on Maxalt for acute therapy.  We talked about appropriate use and potential side effects of both these products.  No other changes currently from a neurological perspective.  We would be happy to see her back at any time.  Thank you very much for the opportunity to participate in the care of this very pleasant woman.

## 2025-03-14 ENCOUNTER — PATIENT ROUNDING (BHMG ONLY) (OUTPATIENT)
Dept: NEUROLOGY | Facility: CLINIC | Age: 50
End: 2025-03-14
Payer: COMMERCIAL

## 2025-04-02 ENCOUNTER — SPECIALTY PHARMACY (OUTPATIENT)
Dept: PHARMACY | Facility: TELEHEALTH | Age: 50
End: 2025-04-02
Payer: COMMERCIAL

## 2025-04-02 NOTE — PROGRESS NOTES
"   Specialty Pharmacy Patient Management Program  Refill Outreach     Ruth \"Raymon\" was contacted today regarding refills of their medication(s).    Refill Questions      Flowsheet Row Most Recent Value   Changes to allergies? No   Changes to medications? No   New conditions or infections since last clinic visit No   Unplanned office visit, urgent care, ED, or hospital admission in the last 4 weeks  No   How does patient/caregiver feel medication is working? Very good   Financial problems or insurance changes  No   Since the previous refill, were any specialty medication doses or scheduled injections missed or delayed?  No   Does this patient require a clinical escalation to a pharmacist? No            Delivery Questions      Flowsheet Row Most Recent Value   Delivery method UPS   Delivery address verified with patient/caregiver? Yes   Delivery address Home   Other address preferred N/A   Number of medications in delivery 1   Medication(s) being filled and delivered Rimegepant Sulfate (NURTEC-ODT)   Doses left of specialty medications 1 OR 2   Copay verified? Yes   Copay amount $0   Copay form of payment No copayment ($0)   Delivery Date Selection 04/03/25   Signature Required No   Do you consent to receive electronic handouts?  Yes                 Follow-up: 21 day(s)     Herminia Newby, Pharmacy Technician  4/2/2025  09:49 EDT    "

## 2025-04-03 DIAGNOSIS — F41.9 ANXIETY: ICD-10-CM

## 2025-04-03 NOTE — TELEPHONE ENCOUNTER
Rx Refill Note  Requested Prescriptions     Pending Prescriptions Disp Refills    FLUoxetine (PROzac) 20 MG capsule 90 capsule 1     Sig: Take 1 capsule by mouth Daily.      Last office visit with prescribing clinician: 1/15/2025   Last telemedicine visit with prescribing clinician: Visit date not found   Next office visit with prescribing clinician: Visit date not found                         Would you like a call back once the refill request has been completed: [] Yes [] No    If the office needs to give you a call back, can they leave a voicemail: [] Yes [] No    Chichi Ojeda MA  04/03/25, 11:00 EDT

## 2025-04-09 DIAGNOSIS — F90.2 ATTENTION DEFICIT HYPERACTIVITY DISORDER (ADHD), COMBINED TYPE: ICD-10-CM

## 2025-04-09 RX ORDER — DEXTROAMPHETAMINE SACCHARATE, AMPHETAMINE ASPARTATE, DEXTROAMPHETAMINE SULFATE AND AMPHETAMINE SULFATE 5; 5; 5; 5 MG/1; MG/1; MG/1; MG/1
20 TABLET ORAL 2 TIMES DAILY
Qty: 60 TABLET | Refills: 0 | Status: SHIPPED | OUTPATIENT
Start: 2025-04-09

## 2025-04-09 NOTE — TELEPHONE ENCOUNTER
Rx Refill Note  Requested Prescriptions     Pending Prescriptions Disp Refills    amphetamine-dextroamphetamine (ADDERALL) 20 MG tablet 60 tablet 0     Sig: Take 1 tablet by mouth 2 (Two) Times a Day.      Last office visit with prescribing clinician: 1/15/2025   Last telemedicine visit with prescribing clinician: Visit date not found   Next office visit with prescribing clinician: Visit date not found                         Would you like a call back once the refill request has been completed: [] Yes [] No    If the office needs to give you a call back, can they leave a voicemail: [] Yes [] No    Kingsley Portillo MA  04/09/25, 09:47 EDT

## 2025-04-28 ENCOUNTER — SPECIALTY PHARMACY (OUTPATIENT)
Dept: PHARMACY | Facility: TELEHEALTH | Age: 50
End: 2025-04-28
Payer: COMMERCIAL

## 2025-04-28 NOTE — PROGRESS NOTES
"   Specialty Pharmacy Patient Management Program  MyChart Refill Outreach       Ragen \"Raymon\" was contacted today regarding refills of their medication(s).    Nayanahart Questionnaire Responses      Flowsheet Row Questionnaire Series Submission from 4/28/2025 in Initial Department with Yvonne, Generic Provider   Changes to allergies? No    Changes to medications? No    New conditions or infections since last clinic visit No    Unplanned office visit, urgent care, ED, or hospital admission in the last 4 weeks  No    How does patient/caregiver feel medication is working? Very good    Financial problems or insurance changes  No    Since the previous refill, were any specialty medication doses or scheduled injections missed or delayed?  No    Delivery address Home    Doses left of specialty medications 2    Copay verified? Yes    Delivery Date Selection 05/01/25             Refill Questions      Flowsheet Row Most Recent Value   Does this patient require a clinical escalation to a pharmacist? No            Delivery Questions      Flowsheet Row Most Recent Value   Delivery method UPS   Delivery address verified with patient/caregiver? Yes   Other address preferred N/A   Number of medications in delivery 1   Medication(s) being filled and delivered Rimegepant Sulfate (NURTEC-ODT)   Copay verified? Yes   Copay amount $0   Copay form of payment No copayment ($0)   Signature Required No   Do you consent to receive electronic handouts?  Yes                   Follow-up: 21 day(s)     Herminia Newby, Pharmacy Technician  4/28/2025  13:26 EDT       "

## 2025-05-27 ENCOUNTER — SPECIALTY PHARMACY (OUTPATIENT)
Dept: PHARMACY | Facility: TELEHEALTH | Age: 50
End: 2025-05-27
Payer: COMMERCIAL

## 2025-05-27 NOTE — PROGRESS NOTES
"   Specialty Pharmacy Patient Management Program  MyChart Refill Outreach       Ragen \"Raymon\" was contacted today regarding refills of their medication(s).    Nayanahart Questionnaire Responses      Flowsheet Row Questionnaire Series Submission from 5/27/2025 in Initial Department with Yvonne, Generic Provider   Changes to allergies? No    Changes to medications? No    New conditions or infections since last clinic visit No    Unplanned office visit, urgent care, ED, or hospital admission in the last 4 weeks  No    How does patient/caregiver feel medication is working? Very good    Financial problems or insurance changes  No    Since the previous refill, were any specialty medication doses or scheduled injections missed or delayed?  No    Delivery address Home    Doses left of specialty medications 3    Copay verified? Yes    Delivery Date Selection 05/30/25             Refill Questions      Flowsheet Row Most Recent Value   Does this patient require a clinical escalation to a pharmacist? No            Delivery Questions      Flowsheet Row Most Recent Value   Delivery method UPS   Delivery address verified with patient/caregiver? Yes   Other address preferred N/A   Number of medications in delivery 1   Medication(s) being filled and delivered Rimegepant Sulfate (NURTEC-ODT)   Copay verified? Yes   Copay amount $0   Copay form of payment No copayment ($0)   Signature Required No   Do you consent to receive electronic handouts?  Yes                   Follow-up: 21 day(s)     Herminia Newby, Pharmacy Technician  5/27/2025  09:53 EDT       "

## 2025-06-04 DIAGNOSIS — F90.2 ATTENTION DEFICIT HYPERACTIVITY DISORDER (ADHD), COMBINED TYPE: ICD-10-CM

## 2025-06-04 NOTE — TELEPHONE ENCOUNTER
PDMP Needs Review   Rx Refill Note  Requested Prescriptions     Pending Prescriptions Disp Refills    amphetamine-dextroamphetamine (ADDERALL) 20 MG tablet 60 tablet 0     Sig: Take 1 tablet by mouth 2 (Two) Times a Day.      Last office visit with prescribing clinician: 1/15/2025   Last telemedicine visit with prescribing clinician: Visit date not found   Next office visit with prescribing clinician: Visit date not found                         Would you like a call back once the refill request has been completed: [] Yes [] No    If the office needs to give you a call back, can they leave a voicemail: [] Yes [] No    Chichi Ojeda MA  06/04/25, 15:47 EDT

## 2025-06-05 RX ORDER — DEXTROAMPHETAMINE SACCHARATE, AMPHETAMINE ASPARTATE, DEXTROAMPHETAMINE SULFATE AND AMPHETAMINE SULFATE 5; 5; 5; 5 MG/1; MG/1; MG/1; MG/1
20 TABLET ORAL 2 TIMES DAILY
Qty: 60 TABLET | Refills: 0 | Status: SHIPPED | OUTPATIENT
Start: 2025-06-05

## 2025-06-25 ENCOUNTER — SPECIALTY PHARMACY (OUTPATIENT)
Dept: PHARMACY | Facility: TELEHEALTH | Age: 50
End: 2025-06-25
Payer: COMMERCIAL

## 2025-06-25 NOTE — PROGRESS NOTES
"   Specialty Pharmacy Patient Management Program  Refill Outreach     [place] \"Raymon\" was contacted today regarding refills of their medication(s).    Refill Questions      Flowsheet Row Most Recent Value   Changes to allergies? No   Changes to medications? No   New conditions or infections since last clinic visit No   Unplanned office visit, urgent care, ED, or hospital admission in the last 4 weeks  No   How does patient/caregiver feel medication is working? Very good   Financial problems or insurance changes  No   Since the previous refill, were any specialty medication doses or scheduled injections missed or delayed?  No   Does this patient require a clinical escalation to a pharmacist? No            Delivery Questions      Flowsheet Row Most Recent Value   Delivery method UPS   Delivery address verified with patient/caregiver? Yes   Delivery address Home   Other address preferred N/A   Number of medications in delivery 1   Medication(s) being filled and delivered Rimegepant Sulfate (NURTEC-ODT)   Doses left of specialty medications 4   Copay verified? Yes   Copay amount $0   Copay form of payment No copayment ($0)   Delivery Date Selection 06/26/25   Signature Required No   Do you consent to receive electronic handouts?  Yes                 Follow-up: 21 day(s)     Herminia Newby, Pharmacy Technician  6/25/2025  10:34 EDT    "

## 2025-07-09 DIAGNOSIS — F90.2 ATTENTION DEFICIT HYPERACTIVITY DISORDER (ADHD), COMBINED TYPE: ICD-10-CM

## 2025-07-09 RX ORDER — DEXTROAMPHETAMINE SACCHARATE, AMPHETAMINE ASPARTATE, DEXTROAMPHETAMINE SULFATE AND AMPHETAMINE SULFATE 5; 5; 5; 5 MG/1; MG/1; MG/1; MG/1
20 TABLET ORAL 2 TIMES DAILY
Qty: 60 TABLET | Refills: 0 | Status: CANCELLED | OUTPATIENT
Start: 2025-07-09

## 2025-07-16 DIAGNOSIS — F90.2 ATTENTION DEFICIT HYPERACTIVITY DISORDER (ADHD), COMBINED TYPE: ICD-10-CM

## 2025-07-16 RX ORDER — DEXTROAMPHETAMINE SACCHARATE, AMPHETAMINE ASPARTATE, DEXTROAMPHETAMINE SULFATE AND AMPHETAMINE SULFATE 5; 5; 5; 5 MG/1; MG/1; MG/1; MG/1
20 TABLET ORAL 2 TIMES DAILY
Qty: 60 TABLET | Refills: 0 | Status: CANCELLED | OUTPATIENT
Start: 2025-07-16

## 2025-07-16 NOTE — TELEPHONE ENCOUNTER
PDMP Needs Review   Rx Refill Note  Requested Prescriptions     Pending Prescriptions Disp Refills    amphetamine-dextroamphetamine (ADDERALL) 20 MG tablet 60 tablet 0     Sig: Take 1 tablet by mouth 2 (Two) Times a Day.      Last office visit with prescribing clinician: 1/15/2025   Last telemedicine visit with prescribing clinician: Visit date not found   Next office visit with prescribing clinician: 8/1/2025                         Would you like a call back once the refill request has been completed: [] Yes [] No    If the office needs to give you a call back, can they leave a voicemail: [] Yes [] No    Chichi Ojeda MA  07/16/25, 15:07 EDT

## 2025-07-21 DIAGNOSIS — G43.E09 CHRONIC MIGRAINE WITH AURA: ICD-10-CM

## 2025-07-22 DIAGNOSIS — F90.2 ATTENTION DEFICIT HYPERACTIVITY DISORDER (ADHD), COMBINED TYPE: ICD-10-CM

## 2025-07-22 RX ORDER — DEXTROAMPHETAMINE SACCHARATE, AMPHETAMINE ASPARTATE, DEXTROAMPHETAMINE SULFATE AND AMPHETAMINE SULFATE 5; 5; 5; 5 MG/1; MG/1; MG/1; MG/1
20 TABLET ORAL 2 TIMES DAILY
Qty: 60 TABLET | Refills: 0 | Status: CANCELLED | OUTPATIENT
Start: 2025-07-22

## 2025-07-22 NOTE — TELEPHONE ENCOUNTER
It says pt requested changes but I do not see anything different from the last Rx   Rx Refill Note  Requested Prescriptions     Pending Prescriptions Disp Refills    rimegepant sulfate ODT (Nurtec) 75 MG disintegrating tablet 16 tablet 5     Sig: Take 1 tablet by mouth Every Other Day.      Last office visit with prescribing clinician: 1/15/2025   Last telemedicine visit with prescribing clinician: Visit date not found   Next office visit with prescribing clinician: 8/1/2025                         Would you like a call back once the refill request has been completed: [] Yes [] No    If the office needs to give you a call back, can they leave a voicemail: [] Yes [] No    Chichi Ojeda MA  07/22/25, 12:06 EDT

## 2025-07-24 NOTE — TELEPHONE ENCOUNTER
PDMP Needs Review   Rx Refill Note  Requested Prescriptions     Pending Prescriptions Disp Refills    amphetamine-dextroamphetamine (ADDERALL) 20 MG tablet 60 tablet 0     Sig: Take 1 tablet by mouth 2 (Two) Times a Day.      Last office visit with prescribing clinician: 1/15/2025   Last telemedicine visit with prescribing clinician: Visit date not found   Next office visit with prescribing clinician: 8/1/2025                         Would you like a call back once the refill request has been completed: [] Yes [] No    If the office needs to give you a call back, can they leave a voicemail: [] Yes [] No    Chichi Ojeda MA  07/24/25, 08:15 EDT

## 2025-07-31 ENCOUNTER — SPECIALTY PHARMACY (OUTPATIENT)
Dept: PHARMACY | Facility: TELEHEALTH | Age: 50
End: 2025-07-31
Payer: COMMERCIAL

## 2025-07-31 NOTE — PROGRESS NOTES
Specialty Pharmacy Patient Management Program  Reassessment     Ruth Casillas is a 49 y.o. female with migraine and enrolled in the Patient Management program offered by [facility]. A follow-up outreach was conducted, including assessment of continued therapy appropriateness, medication adherence, and side effect incidence and management for Nurtec 75 mg ODT.     Changes to Insurance Coverage or Financial Support  none    Relevant Past Medical History and Comorbidities  Relevant medical history and concomitant health conditions were discussed with the patient. The patient's chart has been reviewed for relevant past medical history and comorbid health conditions and updated as necessary.   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 [facility].    Allergic rhinitis     Anemia     Anxiety     ASCUS with positive high risk HPV cervical 2019    Bloating     Cancer     melanoma in situ on back    Chronic nonintractable headache     Depression     Dysmenorrhea     Dyspareunia, female     Fatigue     Menorrhagia     Migraines     Rectal bleeding 2018    Vaginal atrophy     Vaginal laceration 2019    ADMITTED TO [facility]    Vasovagal syncope 2016     Social History     Socioeconomic History    Marital status: Single   Tobacco Use    Smoking status: Former     Current packs/day: 0.00     Average packs/day: 0.5 packs/day for 20.0 years (10.0 ttl pk-yrs)     Types: Cigarettes     Start date:      Quit date:      Years since quittin.5    Smokeless tobacco: Never    Tobacco comments:     quit for one month    Vaping Use    Vaping status: Never Used   Substance and Sexual Activity    Alcohol use: Yes     Alcohol/week: 1.0 standard drink of alcohol     Types: 1 Shots of liquor per week     Comment: MODERATE AMT    Drug use: No    Sexual activity: Not Currently     Partners: Male     Birth control/protection:  Surgical     Comment: [facility]     Problem list reviewed by Amanda Mock PharmD on 7/31/2025 at 11:09 AM    Allergies  Known allergies and reactions were discussed with the patient. The patient's chart has been reviewed for allergy information and updated as necessary.   No Known Allergies  Allergies reviewed by Amanda Mock PharmD on 7/31/2025 at 11:09 AM    Relevant Laboratory Values  Lab Results   Component Value Date    GLUCOSE 87 01/10/2024    CALCIUM 10.1 01/10/2024     01/10/2024    K 5.0 01/10/2024    CO2 27.6 01/10/2024     01/10/2024    BUN 18 01/10/2024    CREATININE 0.87 01/10/2024    BCR 20.7 01/10/2024    ANIONGAP 14.1 09/27/2016     Lab Results   Component Value Date    WBC 5.27 01/10/2024    HGB 14.3 01/10/2024    HCT 42.4 01/10/2024    MCV 91.6 01/10/2024     01/10/2024     Lab Value Review  The above lab values have been reviewed; the following specialty medication(s) dose adjustment(s) are recommended: none.    Current Medication List  This medication list has been reviewed with the patient and evaluated for any interactions or necessary modifications/recommendations, and updated to include all prescription medications, OTC medications, and supplements the patient is currently taking. This list reflects what is contained in the patient's profile, which has also been marked as reviewed to communicate to other providers it is the most up to date version of the patient's current medication therapy.     Current Outpatient Medications:     amphetamine-dextroamphetamine (ADDERALL) 20 MG tablet, Take 1 tablet by mouth 2 (Two) Times a Day., Disp: 60 tablet, Rfl: 0    estradiol (ESTRACE) 0.1 MG/GM vaginal cream, Insert 1 gram into the vagina 2 (Two) Times a Week., Disp: 45 g, Rfl: 6    FLUoxetine (PROzac) 20 MG capsule, Take 1 capsule by mouth Daily., Disp: 90 capsule, Rfl: 1    multivitamin with minerals tablet tablet, Take 1 tablet by mouth Daily., Disp: , Rfl:     rimegepant  sulfate ODT (NURTEC-ODT) 75 MG disintegrating tablet, Take 1 tablet by mouth Every Other Day., Disp: 16 tablet, Rfl: 5    rizatriptan MLT (Maxalt-MLT) 10 MG disintegrating tablet, Place 1 tablet on the tongue 1 (One) Time As Needed for Migraine. May repeat in 2 hours if needed, Disp: 9 tablet, Rfl: 0    tretinoin (RETIN-A) 0.025 % cream, Apply thin layer to face at bedtime for acne, Disp: 45 g, Rfl: 3  Medicines reviewed by Amanda Mock, PharmD on 7/31/2025 at 11:09 AM    Drug Interactions  none     Adverse Drug Reactions  Medication tolerability: Tolerating with no to minimal ADRs  Medication plan: Continue therapy with normal follow-up  Plan for ADR Management: none    Hospitalizations and Urgent Care Since Last Assessment  Hospitalizations or Admissions: none  ED Visits: none  Urgent Office Visits: none     Adherence, Self-Administration, and Current Therapy Problems  Adherence related to the patient's specialty therapy was discussed with the patient. The Adherence segment of this outreach has been reviewed and updated.     Adherence Questions  Linked Medication(s) Assessed: Rimegepant Sulfate (NURTEC-ODT)  On average, how many doses/injections does the patient miss per month?: 0  What are the identified reasons for non-adherence or missed doses? : no problems identified  What is the estimated medication adherence level?: %  Based on the patient/caregiver response and refill history, does this patient require an MTP to track adherence improvements?: no    Additional Barriers to Patient Self-Administration: none  Methods for Supporting Patient Self-Administration: n/a    Open Medication Therapy Problems  No medication therapy recommendations to display    Goals of Therapy  Goals related to the patient's specialty therapy were discussed with the patient. The Patient Goals segment of this outreach has been reviewed and updated.   Goals Addressed Today        Specialty Pharmacy General Goal      Reduce the  number of migraine days per month by 50%.    7/31/25 Patient reports 1-2 monthly migraine days.               Progress Toward Meeting Patient-Identified Goals of Therapy: On Track  New Patient-Identified Goals, If Applicable:     Progress Toward Meeting Clinical Goals or Therapeutic Targets: On Track  New Clinical Goals or Therapeutic Targets, If Applicable:     Quality of Life Assessment   Quality of Life related to the patient's enrollment in the patient management program and services provided was discussed with the patient. The QOL segment of this outreach has been reviewed and updated.  Quality of Life Improvement Scale: 7-Somewhat better    Reassessment Plan & Follow-Up  Medication Therapy Changes: none  Additional Plans, Therapy Recommendations, or Therapy Problems to Be Addressed: none   Pharmacist to perform regular reassessments no more than (6) months from the previous assessment.  Care Coordinator to set up future refill outreaches, coordinate prescription delivery, and escalate clinical questions to pharmacist.     Attestation  I attest the patient was actively involved in and has agreed to the above plan of care. I attest that the specialty medication(s) addressed above are appropriate for the patient based on my reassessment. If the prescribed therapy is at any point deemed not appropriate based on the current or future assessments, a consultation will be initiated with the patient's specialty care provider to determine the best course of action. The revised plan of therapy will be documented along with any required assessments and/or additional patient education provided.     Electronically signed by Amanda Mock PharmD, 07/31/25, 11:10 AM EDT.

## 2025-07-31 NOTE — PROGRESS NOTES
" Specialty Pharmacy Patient Management Program  Call Center Refill Outreach      Ragen \"Raymon\" is a 49 y.o. female contacted today regarding refills of her medication(s).    Specialty medication(s) and dose(s) confirmed: Nurtec 75 mg ODT  Other medications being refilled: none    Refill Questions      Flowsheet Row Most Recent Value   Changes to allergies? No   Changes to medications? No   New conditions or infections since last clinic visit No   Unplanned office visit, urgent care, ED, or hospital admission in the last 4 weeks  No   How does patient/caregiver feel medication is working? Very good   Financial problems or insurance changes  No   Since the previous refill, were any specialty medication doses or scheduled injections missed or delayed?  No   Does this patient require a clinical escalation to a pharmacist? No            Delivery Questions      Flowsheet Row Most Recent Value   Delivery method UPS   Delivery address verified with patient/caregiver? Yes   Delivery address Home   Other address preferred n/a   Number of medications in delivery 1   Medication(s) being filled and delivered Rimegepant Sulfate (NURTEC-ODT)   Doses left of specialty medications a few doses   Copay verified? Yes   Copay amount $0.00   Copay form of payment No copayment ($0)   Delivery Date Selection 08/01/25   Signature Required No   Do you consent to receive electronic handouts?  Yes                   Follow-up: 21 days     Amanda Mock RPH  Specialty Pharmacist  7/31/2025  11:09 EDT   "

## 2025-08-01 ENCOUNTER — OFFICE VISIT (OUTPATIENT)
Dept: INTERNAL MEDICINE | Facility: CLINIC | Age: 50
End: 2025-08-01
Payer: COMMERCIAL

## 2025-08-01 VITALS
OXYGEN SATURATION: 98 % | BODY MASS INDEX: 23.71 KG/M2 | WEIGHT: 138.2 LBS | HEART RATE: 70 BPM | TEMPERATURE: 98 F | SYSTOLIC BLOOD PRESSURE: 110 MMHG | DIASTOLIC BLOOD PRESSURE: 82 MMHG

## 2025-08-01 DIAGNOSIS — N95.1 MENOPAUSAL SYMPTOMS: ICD-10-CM

## 2025-08-01 DIAGNOSIS — F90.2 ATTENTION DEFICIT HYPERACTIVITY DISORDER (ADHD), COMBINED TYPE: ICD-10-CM

## 2025-08-01 DIAGNOSIS — Z00.00 HEALTHCARE MAINTENANCE: Primary | ICD-10-CM

## 2025-08-01 DIAGNOSIS — F41.9 ANXIETY: ICD-10-CM

## 2025-08-01 DIAGNOSIS — G43.101 MIGRAINE WITH AURA AND WITH STATUS MIGRAINOSUS, NOT INTRACTABLE: ICD-10-CM

## 2025-08-01 RX ORDER — DEXTROAMPHETAMINE SACCHARATE, AMPHETAMINE ASPARTATE, DEXTROAMPHETAMINE SULFATE AND AMPHETAMINE SULFATE 5; 5; 5; 5 MG/1; MG/1; MG/1; MG/1
20 TABLET ORAL 2 TIMES DAILY
Qty: 60 TABLET | Refills: 0 | Status: SHIPPED | OUTPATIENT
Start: 2025-08-01

## 2025-08-01 NOTE — PROGRESS NOTES
Chief Complaint   Patient presents with    Annual Exam       Patient Name: Ruth Miranda is a 49 y.o. female presenting for Annual Exam    Ruth Casillas 49 y.o. female who presents for an Annual Wellness Visit.  she has a history of   Patient Active Problem List   Diagnosis    Anxiety    Attention deficit hyperactivity disorder (ADHD), combined type    Diarrhea    Rectal bleeding    Vaginal laceration, subsequent encounter    Atypical squamous cell changes of undetermined significance (ASCUS) on vaginal cytology with positive high risk human papilloma virus (HPV)    Migraine with aura and with status migrainosus, not intractable    Routine gynecological examination   .  she has been doing well with new interval problems.      Health Habits:  Dental Exam. up to date  Eye Exam. Due for appt  Exercise: 5 times/week.  Current exercise activities include: aerobics and weightlifting    Menstrual history:  Last pap date: n/a - s/p hysterectomy  Abnormal pap? yes  Mammogram:utd  Dexa:n/a  Colonoscopy: - cologuard utd  Tob use:no  EtOH use:occ  Qualifies for lung Ca screening?no    Pt had a hysterectomy and wonders if she is going through menopause.       The following portions of the patient's history were reviewed and updated as appropriate: allergies, current medications, past family history, past medical history, past social history, past surgical history and problem list.    Review of Systems   Constitutional: Negative.    HENT: Negative.     Eyes: Negative.    Respiratory: Negative.     Cardiovascular: Negative.    Gastrointestinal: Negative.    Endocrine: Negative.    Genitourinary: Negative.    Musculoskeletal: Negative.    Skin: Negative.    Allergic/Immunologic: Negative.    Neurological: Negative.    Hematological: Negative.    Psychiatric/Behavioral: Negative.     All other systems reviewed and are negative.      Patient has no known allergies.      Current Outpatient Medications:      amphetamine-dextroamphetamine (ADDERALL) 20 MG tablet, Take 1 tablet by mouth 2 (Two) Times a Day., Disp: 60 tablet, Rfl: 0    estradiol (ESTRACE) 0.1 MG/GM vaginal cream, Insert 1 gram into the vagina 2 (Two) Times a Week., Disp: 45 g, Rfl: 6    FLUoxetine (PROzac) 20 MG capsule, Take 1 capsule by mouth Daily., Disp: 90 capsule, Rfl: 1    multivitamin with minerals tablet tablet, Take 1 tablet by mouth Daily., Disp: , Rfl:     rimegepant sulfate ODT (NURTEC-ODT) 75 MG disintegrating tablet, Take 1 tablet by mouth Every Other Day., Disp: 16 tablet, Rfl: 5    rizatriptan MLT (Maxalt-MLT) 10 MG disintegrating tablet, Place 1 tablet on the tongue 1 (One) Time As Needed for Migraine. May repeat in 2 hours if needed, Disp: 9 tablet, Rfl: 0    tretinoin (RETIN-A) 0.025 % cream, Apply thin layer to face at bedtime for acne, Disp: 45 g, Rfl: 3    OBJECTIVE    /82 (BP Location: Right arm, Patient Position: Sitting, Cuff Size: Adult)   Pulse 70   Temp 98 °F (36.7 °C) (Infrared)   Wt 62.7 kg (138 lb 3.2 oz)   SpO2 98%   BMI 23.71 kg/m²     Physical Exam  Vitals and nursing note reviewed.   Constitutional:       General: She is not in acute distress.     Appearance: She is well-developed.   HENT:      Head: Normocephalic and atraumatic.      Right Ear: External ear normal.      Left Ear: External ear normal.      Nose: Nose normal.   Eyes:      Conjunctiva/sclera: Conjunctivae normal.      Pupils: Pupils are equal, round, and reactive to light.   Cardiovascular:      Rate and Rhythm: Normal rate and regular rhythm.      Heart sounds: Normal heart sounds.   Pulmonary:      Effort: Pulmonary effort is normal. No respiratory distress.      Breath sounds: Normal breath sounds. No wheezing.   Musculoskeletal:         General: Normal range of motion.      Cervical back: Normal range of motion and neck supple.      Comments: Normal gait   Skin:     General: Skin is warm and dry.   Neurological:      General: No focal  deficit present.      Mental Status: She is alert and oriented to person, place, and time.   Psychiatric:         Mood and Affect: Mood normal.         Behavior: Behavior normal.         Thought Content: Thought content normal.         Judgment: Judgment normal.           ASSESSMENT AND PLAN  Diagnoses and all orders for this visit:    1. Healthcare maintenance (Primary)  -     Cancel: CBC & Differential  -     Cancel: Hemoglobin A1c  -     Cancel: Lipid Panel With LDL / HDL Ratio  -     Cancel: Comprehensive Metabolic Panel  -     Cancel: T4, Free  -     Cancel: TSH    2. Anxiety  -     Cancel: CBC & Differential  -     Cancel: Hemoglobin A1c  -     Cancel: Lipid Panel With LDL / HDL Ratio  -     Cancel: Comprehensive Metabolic Panel  -     Cancel: T4, Free  -     Cancel: TSH  -     CBC & Differential  -     Comprehensive Metabolic Panel  -     Hemoglobin A1c  -     Lipid Panel With LDL / HDL Ratio  -     T4, Free  -     TSH    3. Attention deficit hyperactivity disorder (ADHD), combined type  -     Cancel: CBC & Differential  -     Cancel: Hemoglobin A1c  -     Cancel: Lipid Panel With LDL / HDL Ratio  -     Cancel: Comprehensive Metabolic Panel  -     Cancel: T4, Free  -     Cancel: TSH  -     amphetamine-dextroamphetamine (ADDERALL) 20 MG tablet; Take 1 tablet by mouth 2 (Two) Times a Day.  Dispense: 60 tablet; Refill: 0  -     CBC & Differential  -     Comprehensive Metabolic Panel  -     Hemoglobin A1c  -     Lipid Panel With LDL / HDL Ratio  -     T4, Free  -     TSH    4. Migraine with aura and with status migrainosus, not intractable  -     CBC & Differential  -     Comprehensive Metabolic Panel  -     Hemoglobin A1c  -     Lipid Panel With LDL / HDL Ratio  -     T4, Free  -     TSH    5. Menopausal symptoms  -     Cancel: FSH & LH; Future  -     CBC & Differential  -     Comprehensive Metabolic Panel  -     Hemoglobin A1c  -     Lipid Panel With LDL / HDL Ratio  -     T4, Free  -     TSH  -     FSH &  LH        Discussed healthy diet, exercise, cancer screening, immunizations, and preventive care.  Hm tab updated.  Encouraged seat belt use.  No texting while driving. Orders placed as below.     Encouraged covid vaccination if not already done.  Covid vaccination can be scheduled at [email or web address]    use calcium 1 gram daily with Vit D, continue current medications, continue current healthy lifestyle patterns, and return for routine annual checkups    [unfilled]    Return in about 3 months (around 11/1/2025) for Recheck.

## 2025-08-02 LAB
ALBUMIN SERPL-MCNC: 4.9 G/DL (ref 3.9–4.9)
ALP SERPL-CCNC: 77 IU/L (ref 44–121)
ALT SERPL-CCNC: 25 IU/L (ref 0–32)
AST SERPL-CCNC: 31 IU/L (ref 0–40)
BASOPHILS # BLD AUTO: 0.1 X10E3/UL (ref 0–0.2)
BASOPHILS NFR BLD AUTO: 1 %
BILIRUB SERPL-MCNC: 0.3 MG/DL (ref 0–1.2)
BUN SERPL-MCNC: 18 MG/DL (ref 6–24)
BUN/CREAT SERPL: 21 (ref 9–23)
CALCIUM SERPL-MCNC: 10.1 MG/DL (ref 8.7–10.2)
CHLORIDE SERPL-SCNC: 99 MMOL/L (ref 96–106)
CHOLEST SERPL-MCNC: 172 MG/DL (ref 100–199)
CO2 SERPL-SCNC: 20 MMOL/L (ref 20–29)
CREAT SERPL-MCNC: 0.87 MG/DL (ref 0.57–1)
EGFRCR SERPLBLD CKD-EPI 2021: 81 ML/MIN/1.73
EOSINOPHIL # BLD AUTO: 0.2 X10E3/UL (ref 0–0.4)
EOSINOPHIL NFR BLD AUTO: 3 %
ERYTHROCYTE [DISTWIDTH] IN BLOOD BY AUTOMATED COUNT: 12.4 % (ref 11.7–15.4)
FSH SERPL-ACNC: 143 MIU/ML
GLOBULIN SER CALC-MCNC: 2.5 G/DL (ref 1.5–4.5)
GLUCOSE SERPL-MCNC: 90 MG/DL (ref 70–99)
HBA1C MFR BLD: 5.7 % (ref 4.8–5.6)
HCT VFR BLD AUTO: 43.3 % (ref 34–46.6)
HDLC SERPL-MCNC: 65 MG/DL
HGB BLD-MCNC: 14 G/DL (ref 11.1–15.9)
IMM GRANULOCYTES # BLD AUTO: 0 X10E3/UL (ref 0–0.1)
IMM GRANULOCYTES NFR BLD AUTO: 0 %
LDLC SERPL CALC-MCNC: 93 MG/DL (ref 0–99)
LDLC/HDLC SERPL: 1.4 RATIO (ref 0–3.2)
LH SERPL-ACNC: 79 MIU/ML
LYMPHOCYTES # BLD AUTO: 1.6 X10E3/UL (ref 0.7–3.1)
LYMPHOCYTES NFR BLD AUTO: 26 %
MCH RBC QN AUTO: 30.8 PG (ref 26.6–33)
MCHC RBC AUTO-ENTMCNC: 32.3 G/DL (ref 31.5–35.7)
MCV RBC AUTO: 95 FL (ref 79–97)
MONOCYTES # BLD AUTO: 0.3 X10E3/UL (ref 0.1–0.9)
MONOCYTES NFR BLD AUTO: 4 %
NEUTROPHILS # BLD AUTO: 4.1 X10E3/UL (ref 1.4–7)
NEUTROPHILS NFR BLD AUTO: 66 %
PLATELET # BLD AUTO: 322 X10E3/UL (ref 150–450)
POTASSIUM SERPL-SCNC: 4.6 MMOL/L (ref 3.5–5.2)
PROT SERPL-MCNC: 7.4 G/DL (ref 6–8.5)
RBC # BLD AUTO: 4.55 X10E6/UL (ref 3.77–5.28)
SODIUM SERPL-SCNC: 138 MMOL/L (ref 134–144)
T4 FREE SERPL-MCNC: 1.07 NG/DL (ref 0.82–1.77)
TRIGL SERPL-MCNC: 75 MG/DL (ref 0–149)
TSH SERPL DL<=0.005 MIU/L-ACNC: 2.28 UIU/ML (ref 0.45–4.5)
VLDLC SERPL CALC-MCNC: 14 MG/DL (ref 5–40)
WBC # BLD AUTO: 6.2 X10E3/UL (ref 3.4–10.8)

## 2025-08-25 ENCOUNTER — SPECIALTY PHARMACY (OUTPATIENT)
Dept: PHARMACY | Facility: TELEHEALTH | Age: 50
End: 2025-08-25
Payer: COMMERCIAL

## (undated) DEVICE — LEGGINGS, PAIR, CLEAR, STERILE: Brand: MEDLINE

## (undated) DEVICE — PAD SANI MAXI W/ADHS SNG WRP 11IN

## (undated) DEVICE — LAG PERI GYN: Brand: MEDLINE INDUSTRIES, INC.

## (undated) DEVICE — GOWN,PREVENTION PLUS,XXLARGE,STERILE: Brand: MEDLINE

## (undated) DEVICE — GLV SURG NEOLON 2G PF LF 6.5 STRL

## (undated) DEVICE — SYR LL TP 10ML STRL

## (undated) DEVICE — JELLY,LUBE,STERILE,FLIP TOP,TUBE,4-OZ: Brand: MEDLINE

## (undated) DEVICE — GLV SURG SENSICARE MICRO PF LF 7 STRL

## (undated) DEVICE — SUCTION CANISTER, 1000CC,SAFELINER: Brand: DEROYAL

## (undated) DEVICE — GLV SURG SENSICARE ORTHO PF LF 7 STRL

## (undated) DEVICE — ENDOPATH PNEUMONEEDLE INSUFFLATION NEEDLES WITH LUER LOCK CONNECTORS 120MM: Brand: ENDOPATH

## (undated) DEVICE — BNDG ADHS CURAD FLX/FABRC 2X4IN STRL LF

## (undated) DEVICE — TBG INSUFL W FLTR STRL

## (undated) DEVICE — ENDOPATH XCEL BLADELESS TROCARS WITH STABILITY SLEEVES: Brand: ENDOPATH XCEL

## (undated) DEVICE — DRAPE,ROBOTICS,NO LEGGINGS,STERILE: Brand: MEDLINE

## (undated) DEVICE — GLV SURG SENSICARE MICRO PF LF 6.5 STRL

## (undated) DEVICE — SUT VIC 0 CT1 36IN J946H

## (undated) DEVICE — SAFESECURE,SECUREMENT,FOLEY CATH,STERILE: Brand: MEDLINE

## (undated) DEVICE — SUT VIC FS2 4/0 27IN J422H